# Patient Record
Sex: FEMALE | Race: OTHER | HISPANIC OR LATINO | Employment: UNEMPLOYED | URBAN - METROPOLITAN AREA
[De-identification: names, ages, dates, MRNs, and addresses within clinical notes are randomized per-mention and may not be internally consistent; named-entity substitution may affect disease eponyms.]

---

## 2022-05-09 ENCOUNTER — TELEPHONE (OUTPATIENT)
Dept: FAMILY MEDICINE CLINIC | Facility: CLINIC | Age: 67
End: 2022-05-09

## 2022-05-09 NOTE — TELEPHONE ENCOUNTER
Dr Attila Eduardo 5/16/22 at 3:20 pm told me when we ready to hung up needs transportation for appointment   Czech speaking

## 2022-05-10 ENCOUNTER — PATIENT OUTREACH (OUTPATIENT)
Dept: FAMILY MEDICINE CLINIC | Facility: CLINIC | Age: 67
End: 2022-05-10

## 2022-05-10 DIAGNOSIS — Z74.8 ASSISTANCE NEEDED WITH TRANSPORTATION: Primary | ICD-10-CM

## 2022-05-10 NOTE — PROGRESS NOTES
SW attempted to reach pt via  ID # [id number] but SW  had to leave a message  SW also called Emergency Contact Denise Saavedra [phone] and requested he have pt return SW call  Pt attempted to return call X 3     CORBY then called pt back it went to VM X 4  and SW left another message  SW has called Ladan Martinez [phone] X [phone] and spoke to Lillie Howard to see if we can start a 60 day temporary registration  CORBY to check on this Request tomorrow and return call to pt again

## 2022-05-11 ENCOUNTER — PATIENT OUTREACH (OUTPATIENT)
Dept: FAMILY MEDICINE CLINIC | Facility: CLINIC | Age: 67
End: 2022-05-11

## 2022-05-11 NOTE — PROGRESS NOTES
SWCM outreached pt to follow up on transportation needs as let her know that at temporary 60 day registration was received  SWCM used language line  name, Raissa Jacobs #[id number]  Pt did not , VM left via   SW to follow up

## 2022-05-11 NOTE — PROGRESS NOTES
CORBY spoke with Renetta Gavin this date sn pt has been  APPROVED for a TEMPORARY 60 Day Registration from 5/11/22 - 7/10/22  She needs to complete and submit the full [place] age 72 and over application before 0/88/[birth year] to maintain this service  Sw has asked covering SW to inform pt of same, help set up her ride for the upcoming appointment  and help with full application

## 2022-05-12 ENCOUNTER — PATIENT OUTREACH (OUTPATIENT)
Dept: FAMILY MEDICINE CLINIC | Facility: CLINIC | Age: 67
End: 2022-05-12

## 2022-05-12 NOTE — PROGRESS NOTES
RAMEZ received VM from pt to return call  CORBY used Language Line  [phone]  Pt resides with her friend Jerri Huddleston  Pt has 2 sons and DIL who live local and who are supportive  Pt does not report any source of income at this time, but reports that her children support her to afford housing and food  She denies any difficulty obtaining food or paying bills  The trouble she is having is that her family cannot always provide her rides and she does not have a vehicle  CORBY informed pt that we were able to get her approved for 60 day temporary registration with Selena Henry from 5/11/2022-7/10-22  CORBY contacted Elizabeth malave pt on the phone to set up her service for Monday to her 3PM appt at [facility]  Pt provided details to schedule other upcoming appts  RAMEZ will meet with pt in the office on 5/16 to assist her with Vergie Genin application  Pt instructed to bring in copy of ins cards and identification  No other reported needs at this time  CORBY will open case and follow up with pt to make sure Vergie Genin application is completed and sent

## 2022-05-16 ENCOUNTER — OFFICE VISIT (OUTPATIENT)
Dept: FAMILY MEDICINE CLINIC | Facility: CLINIC | Age: 67
End: 2022-05-16

## 2022-05-16 ENCOUNTER — PATIENT OUTREACH (OUTPATIENT)
Dept: FAMILY MEDICINE CLINIC | Facility: CLINIC | Age: 67
End: 2022-05-16

## 2022-05-16 VITALS
HEART RATE: 73 BPM | TEMPERATURE: 98.4 F | DIASTOLIC BLOOD PRESSURE: 68 MMHG | SYSTOLIC BLOOD PRESSURE: 115 MMHG | OXYGEN SATURATION: 98 % | HEIGHT: 60 IN | BODY MASS INDEX: 41.62 KG/M2 | RESPIRATION RATE: 18 BRPM | WEIGHT: 212 LBS

## 2022-05-16 DIAGNOSIS — Z12.31 ENCOUNTER FOR SCREENING MAMMOGRAM FOR MALIGNANT NEOPLASM OF BREAST: ICD-10-CM

## 2022-05-16 DIAGNOSIS — Z00.00 HEALTHCARE MAINTENANCE: ICD-10-CM

## 2022-05-16 DIAGNOSIS — Z13.6 SCREENING FOR CARDIOVASCULAR CONDITION: ICD-10-CM

## 2022-05-16 DIAGNOSIS — R53.82 CHRONIC FATIGUE: ICD-10-CM

## 2022-05-16 DIAGNOSIS — Z13.31 SCREENING FOR DEPRESSION: ICD-10-CM

## 2022-05-16 DIAGNOSIS — Z71.3 NUTRITIONAL COUNSELING: ICD-10-CM

## 2022-05-16 DIAGNOSIS — Z78.0 ENCOUNTER FOR OSTEOPOROSIS SCREENING IN ASYMPTOMATIC POSTMENOPAUSAL PATIENT: ICD-10-CM

## 2022-05-16 DIAGNOSIS — Z23 ENCOUNTER FOR IMMUNIZATION: ICD-10-CM

## 2022-05-16 DIAGNOSIS — Z13.820 ENCOUNTER FOR OSTEOPOROSIS SCREENING IN ASYMPTOMATIC POSTMENOPAUSAL PATIENT: ICD-10-CM

## 2022-05-16 DIAGNOSIS — H61.23 EXCESSIVE CERUMEN IN BOTH EAR CANALS: ICD-10-CM

## 2022-05-16 DIAGNOSIS — Z11.59 ENCOUNTER FOR HEPATITIS C SCREENING TEST FOR LOW RISK PATIENT: ICD-10-CM

## 2022-05-16 DIAGNOSIS — M25.551 RIGHT HIP PAIN: ICD-10-CM

## 2022-05-16 DIAGNOSIS — Z11.4 ENCOUNTER FOR SCREENING FOR HIV: ICD-10-CM

## 2022-05-16 DIAGNOSIS — Z00.00 ANNUAL PHYSICAL EXAM: Primary | ICD-10-CM

## 2022-05-16 DIAGNOSIS — Z23 NEED FOR TDAP VACCINATION: ICD-10-CM

## 2022-05-16 DIAGNOSIS — Z12.11 COLON CANCER SCREENING: ICD-10-CM

## 2022-05-16 DIAGNOSIS — Z71.82 EXERCISE COUNSELING: ICD-10-CM

## 2022-05-16 PROCEDURE — 99387 INIT PM E/M NEW PAT 65+ YRS: CPT

## 2022-05-16 PROCEDURE — 90471 IMMUNIZATION ADMIN: CPT

## 2022-05-16 PROCEDURE — 90677 PCV20 VACCINE IM: CPT

## 2022-05-16 PROCEDURE — 90472 IMMUNIZATION ADMIN EACH ADD: CPT

## 2022-05-16 PROCEDURE — 90715 TDAP VACCINE 7 YRS/> IM: CPT

## 2022-05-16 PROCEDURE — 99214 OFFICE O/P EST MOD 30 MIN: CPT

## 2022-05-16 NOTE — PATIENT INSTRUCTIONS
Visita de bienestar para los adultos   CUIDADO AMBULATORIO:   Angela visita de bienestar es cuando usted acude con un médico para que le mariangel exámenes de detección de problemas de [place]  Thomas médico también le dará consejos sobre cómo mantenerse saludable  Anote katja preguntas para que se acuerde de hacerlas  Pregunte a thomas médico con qué frecuencia debería realizarse angela visita de bienestar  Lo que sucede en angela visita de bienestar: Thomas médico le preguntará sobre thomas cynthia y thomas historia familiar [address]  [place] incluye presión arterial gilda, enfermedades del corazón y cáncer  El médico le preguntará si tiene síntomas que le preocupen, si [facility] y [place] de ánimo  También se le preguntará acerca del uso de medicamentos, suplementos, alimentos y alcohol  Es posible que le mariangel cualquiera de lo siguiente:  Thomas peso será revisado  Es posible que [employer] midan thomas altura para calcular thomas índice de masa corporal [facility])  El [address] indica si tiene un peso saludable  Se verificarán thomas presión arterial y frecuencia cardíaca  También pueden revisar thomas temperatura  Exámenes de [facility] y [facility] podría realizarse  Se podrían realizar exámenes de joao para revisar los niveles de [facility]  Los niveles anormales de colesterol aumentan el riesgo de enfermedad del corazón y accidente cerebrovascular  Puede que también necesite angela prueba de joao u orina para revisar si tiene diabetes si usted está en mayor riesgo  Las pruebas de orina pueden hacerse para buscar signos de angela infección o angela enfermedad renal     Un examen físico incluye la comprobación de katja latidos del corazón y los pulmones con un estetoscopio  Thomas médico también podría revisarle la piel para buscar daños causados por el sol  Pruebas de detección podría recomendarse  Se realiza un examen de detección para detectar enfermedades que pueden no causar síntomas   Los exámenes de detección que necesite dependen de thomas edad, género, antecedentes familiares y hábitos de harjit  Por ejemplo, podrían recomendarle la exploración selectiva colorrectal si tiene 48 años o más  Si es [place], necesita los siguientes exámenes de detección:  El examen de Papanicolaou se [place] para detectar cáncer de haley uterino  El examen del Papanicolaou por lo general se realiza entre 3 a 5 años dependiendo de thomas edad  Puede necesitarlo más a menudo si usted ha tenido [employer] de la prueba de Papanicolaou en el pasado  Pregunte a thomas médico con qué frecuencia debería realizarse un examen de Papanicolaou  Angela mamografía es angela radiografía de katja mamas para detectar cáncer de mama  Los expertos recomiendan [address] cada 2 años empezando a los 48 años de [place]  Es probable que usted necesite [address] a los 52 años o antes si tiene riesgo alto de cáncer de seno  Hable con thomas médico sobre cuándo debe empezar con katja mamografías y con cuánta frecuencia las necesita  Vacunas que podría necesitar:  Debe recibir angela vacuna contra la influenza todos los Los rosita  La vacuna contra la influenza protege de la gripe  Varios tipos de virus causan la influenza  Debido a que los virus [place] con el [place], es necesaria la producción de nuevas vacunas cada año  Debe recibir Zorita Slack vacuna de refuerzo contra el tétanos-difteria (Td) cada 10 años  Esta vacuna protege contra el tétanos y la difteria  El tétanos es angela infección severa que puede causar trismo y espasmos musculares dolorosos  La difteria es angela infección bacteriana grave que produce angela cubierta gruesa en la parte de atrás de la boca y garganta  Debe recibir la vacuna contra el virus del papiloma humano (VPH) si usted es karen y [place] 19 y 32 años o es hombre y [place] 23 y 24 años y nunca la recibió  Esta vacuna protege contra la infección por VPH   El virus del papiloma humano o VPH es la infección más común que se transmite por contacto sexual  El virus del papiloma humano también podría provocar cáncer vaginal, del pene y del ano  Debe recibir la vacuna antineumocócica si tiene más de 70 años  La vacuna antineumocócica es angela inyección que se administra para protegerlo contra angela enfermedad neumocócica  La enfermedad neumocócica es angela infección causada por la bacteria neumocócica  La infección puede causar neumonía, meningitis o angela infección del oído  Debe recibir la vacuna contra la culebrilla Si tiene 61 años o más, incluso si ha tenido culebrilla antes  La vacuna contra la culebrilla (herpes zóster) es angela inyección usada para proteger contra el virus zóster que causa la varicela  Desi es el mismo virus que causa la varicela  La culebrilla es un sarpullido doloroso que se desarrolla en personas que tuvieron varicela o mace estado expuestas al virus  Cómo comer saludable: Mi [employer] es un modelo para planear comidas sanas  Muestra los tipos y cantidades de alimentos que deberían ir en un plato  Diana Mires y verduras representan alrededor de la mitad de thomas plato y los granos y proteínas representan la otra mitad  Se incluye angela porción de productos lácteos al lado del plato  La cantidad de calorías y [address] de las porciones que usted necesita dependen de thomas edad, [place], peso y altura  Los ejemplos de alimentos saludables son:  Pierce Fernando variedad de verduras flavio las de color pavan oscuro, quintero y [place]  Usted también puede incluir verduras enlatadas bajas en sodio (sal) y verduras congeladas sin mantequilla ni salsas [id number]  Consuma angela variedad de fruta frescas , las frutas enlatadas en 100% de jugo, fruta [place] y bree secos  Incluya granos enteros  Por lo menos la mitad de los granos que usted consume deben ser granos de helio integral  Por ejemplo, panes de grano entero, pasta integral, arroz integral y cereales de grano entero flavio la andrew      Consuma angela variedad de alimentos con proteínas flavio mariscos (pescado y crustáceos), Susie Cruzito y carne de ave sin piel (pavo y luis)  Ejemplos de jamarcus magras incluyen pierna, paleta o lomo de puerco y steven, solomillo o, lomo de res y carne [place] de res extra [place]  Otros alimentos ricos en proteínas son los huevos y sustitutos de [place], frijoles, chícharos, productos de soya, nueces y semillas  Elija productos lácteos bajos en grasa [employer] o del 1% o yogur, Michele Fail y requesón bajos en grasa  Limite las grasas poco saludables, flavio la [place], la margarina dura y la [place]  Ejercicio: Realice angela actividad física por lo menos 30 minutos al día, la mayoría de los días de la [place]  Algunos de los ejercicios incluyen caminar, montar en bicicleta, bailar y nadar  Usted también puede realizar más actividad física usando las escaleras en vez de los ascensores o estacionarse más lejos cuando Lorra Pouch a las tiendas  Incluya ejercicios para fortalecer los músculos 2 días a la semana  El ejercicio regular proporciona muchos beneficios para la cynthia  Evorn Cordon a controlar thomas peso y [employer] riesgo de diabetes tipo 2, presión arterial gilda, enfermedad del corazón y accidente cerebrovascular  El ejercicio [employer] puede ayudar a mejorar thomas estado de ánimo  Pregunte a thomas médico acerca del mejor plan de ejercicio para usted  Pautas generales de cynthia y seguridad:  No fume  La nicotina y otras sustancias químicas que contienen los cigarrillos y cigarros pueden dañar los pulmones  Pida información a thomas médico si usted actualmente fuma y necesita ayuda para dejar de fumar  Los cigarrillos electrónicos o el tabaco sin humo igualmente contienen nicotina  Consulte con thomas médico antes de [employer]  Limite el consumo de alcohol  Un trago equivale a 12 onzas de cerveza, 5 onzas de vino o 1 onza y ½ de licor  Baje de peso, si es necesario  El sobrepeso aumenta el riesgo de ciertas condiciones de [employer]   Estos incluyen enfermedad del corazón, presión arterial gilda, diabetes tipo 2 y ciertos tipos de cáncer  Proteja thomas piel  No tome el sol ni use melissa de bronceado  Use un protector solar con un FPS mayor a 13  Aplíquese el bloqueador por lo menos 15 minutos antes de que vaya a estar al [employer]  Vuelva a aplicarse la crema solar cada 2 horas  Use ropa protectora, sombrero y lentes para el sol cuando se encuentre afuera  Conduzca con seguridad  Use siempre thomas cinturón de seguridad  Asegúrese que todos en el tea usan el cinturón de seguridad  Un cinturón de seguridad puede salvar thomas harjit en veronika de un accidente automovilístico  No use el celular cuando esté manejando  Corn puede hacer que se distraiga y causar un accidente  Es mejor que pare y se orille si necesita hacer angela Ceola Petit un texto  Practique el sexo seguro  Use condones de látex si es sexualmente [place] y tiene más de [place] and [place]  Thomas médico puede recomendar exámenes de detección de infecciones de transmisión sexual (ITS)  Use un carlie, un chaleco salvavidas y unos implementos de protección  Siempre use un carlie al [employer] en bicicleta o motocicleta, esquiar o jugar deportes que podrían causar angela lesión en la keven  Use implementos de protección cuando practique deportes  Use un chaleco salvavidas cuando esté en un bote o practicando actividades acuáticas  © Copyright [employer] 2022 Information is for End User's use only and may not be sold, redistributed or otherwise used for commercial purposes  All illustrations and images included in CareNotes® are the copyrighted property of [employer] , [employer]  or [address] [place] [address] es sólo para uso en educación  Thomas intención no es darle un consejo médico sobre enfermedades o tratamientos  Colsulte con thomas Joanell Saw farmacéutico antes de seguir cualquier régimen médico para saber si es seguro y efectivo para usted

## 2022-05-16 NOTE — PROGRESS NOTES
OFFICE VISIT NOTE - [id number] [facility] Elio 78 y o  (:1955) female MRN: [id number]  Unit/Bed#:  Encounter: [id number]      Date: 22    Assessment and Plan     Chronic fatigue  Reports persistent chronic fatigue x several months  endorses snoring, daytime sleepiness, unrefreshed sleep c/w CHEMO  depression screen negative, will order blood work and sleep study    Right hip pain  No red flags, most likely 2/2 osteoarthritis given other joint pains but no inflammation, rash or other concerning sx  Right hip XR ordered, follow-up in 2 wks    Healthcare maintenance  - Patient is a 78 y o  female new to practice establishing care  - BP today 115/68, at goal  - Screening for cardiovascular disease: A1c, BMP and Lipid panel ordered today  - Screening for colorectal cancer: ordered today, none on file but pt reports she has done it several years ago  - Screening for breast cancer: ordered today, never done  - Screening for depression: PHQ-2 score 0  - Screening for HIV and Hepatitis C: ordered today  - Screening for Osteoporosis: ordered today  - Fall prevention: no recent falls  - Immunizations: Tdap and pneumococcal vaccine given today  - Discussed about healthy lifestyle recommendations including healthful diet, exercise, weight loss  - BMI Counseling: Body mass index is 40 99 kg/m²  The BMI is above normal  Nutrition recommendations include 3-5 servings of fruits/vegetables daily, decreasing soda and/or juice intake, moderation in carbohydrate intake and increasing intake of lean protein  Exercise recommendations include moderate aerobic physical activity for 150 minutes/week  Patient referred to weight management due to patient being morbidly obese  Diagnoses and all orders for this visit:    Annual physical exam    Screening for cardiovascular condition  -     Lipid panel; Future  -     Comprehensive metabolic panel;  Future  -     Lipid panel  - Comprehensive metabolic panel    Encounter for screening mammogram for malignant neoplasm of breast  -     Mammo screening bilateral w 3d & cad; Future    Colon cancer screening  -     Ambulatory referral to Gastroenterology; Future    Encounter for osteoporosis screening in asymptomatic postmenopausal patient  -     DXA bone density spine hip and pelvis; Future    Encounter for screening for HIV  -     HIV 1/2 Antigen/Antibody (4th Generation) w Reflex [facility]; Future    Encounter for hepatitis C screening test for low risk patient  -     Hepatitis C antibody; Future    Encounter for immunization  -     Cancel: TDAP VACCINE GREATER THAN OR EQUAL TO 8YO IM  -     Cancel: Pneumococcal Conjugate Vaccine 20-valent (Pcv20)  -     Pneumococcal Conjugate Vaccine 20-valent (Pcv20)  -     TDAP VACCINE GREATER THAN OR EQUAL TO 8YO IM    Screening for depression    Nutritional counseling    Need for Tdap vaccination    Exercise counseling    Healthcare maintenance    Chronic fatigue  -     Hemoglobin A1C; Future  -     TSH, 3rd generation with Free T4 reflex; Future  -     CBC; Future  -     Ambulatory Referral to Sleep Medicine; Future  -     Iron Panel (Includes Ferritin, Iron Sat%, Iron, and TIBC); Future  -     Ambulatory Referral to Weight Management; Future  -     TSH, 3rd generation with Free T4 reflex  -     CBC  -     Vitamin B12/Folate, Serum Panel; Future  -     Vitamin B12/Folate, Serum Panel    Right hip pain  -     XR hip/pelv 2-3 vws right if performed; Future    Excessive cerumen in both ear canals  -     carbamide peroxide (DEBROX) 6 5 % otic solution; Administer 5 drops into both ears in the morning and 5 drops in the evening          Subjective:  Chief Complaint   Patient presents with    Right leg pain routing to right hip     Per patient this has been going on for two years and never been treated    Establish Care       History of Present Illness     Simmie Schilder is a 78 y o  female new to our practice and establishing care  Patient used to live in [place] prior but moved to [place] in December   was used for this visit  Patient reports right hip pain that is chronic for her x several years which has been gradually worsening  She is able to ambulate wo any assistive device  She denies fever, unexpected weight changes, radiation to leg, weakness, numbness or tingling  He denies history of fall, trauma or injury  She is unaware if she has osteoarthritis, no imaging found on file  Patient denies smoking, alcohol or illicit drug use  She reports that she gets GI intolerance from aspirin  Does not take any routine meds  Patient feels other also raises concern about possible sleep apnea  She reports that she never feels refreshed after a long night's sleep and would wake up feeling tired throughout her day  She denies cough, nausea, vomiting, abdominal pain, palpitations, SOB, blood in stool or hematuria  Patient denies history of heart disease, HTN, hyperlipidemia or stroke  Family history is negative diabetes HTN but patient reports that father passed from metastatic stomach cancer  Her mother is aged 80 and healthy  The following portions of the patient's history were reviewed and updated as appropriate: allergies, current medications, past family history, past medical history, past social history, past surgical history and problem list     Review of Systems     Review of Systems   Constitutional: Negative for fatigue and fever  HENT: Negative for sore throat  Respiratory: Negative for cough, shortness of breath and wheezing  Cardiovascular: Negative for chest pain, palpitations and leg swelling  Gastrointestinal: Negative for abdominal pain, diarrhea, nausea and vomiting  Genitourinary: Negative for dysuria and pelvic pain  Skin: Negative for rash  Neurological: Negative for weakness and headaches         Current Medications     No current outpatient medications on file prior to visit  No current facility-administered medications on file prior to visit  Objective     Vitals: /68 (BP Location: Right arm, Patient Position: Sitting, Cuff Size: Standard)   Pulse 73   Temp 98 4 °F (36 9 °C) (Temporal)   Resp 18   Ht 5' 0 3" (1 532 m)   Wt 96 2 kg (212 lb)   SpO2 98%   BMI 40 99 kg/m²       Physical Exam  Constitutional:       General: She is not in acute distress  Appearance: She is obese  She is not ill-appearing  HENT:      Head: Normocephalic and atraumatic  Right Ear: Tympanic membrane, ear canal and external ear normal  There is no impacted cerumen  Left Ear: Tympanic membrane, ear canal and external ear normal  There is no impacted cerumen  Nose: Nose normal       Mouth/Throat:      Mouth: Mucous membranes are moist       Pharynx: No oropharyngeal exudate or posterior oropharyngeal erythema  Eyes:      General:         Right eye: No discharge  Left eye: No discharge  Extraocular Movements: Extraocular movements intact  Conjunctiva/sclera: Conjunctivae normal       Pupils: Pupils are equal, round, and reactive to light  Cardiovascular:      Rate and Rhythm: Normal rate and regular rhythm  Heart sounds: Normal heart sounds  No murmur heard  Pulmonary:      Effort: Pulmonary effort is normal  No respiratory distress  Breath sounds: Normal breath sounds  No wheezing  Abdominal:      General: Bowel sounds are normal  There is no distension  Palpations: Abdomen is soft  There is no mass  Tenderness: There is no abdominal tenderness  There is no guarding or rebound  Musculoskeletal:         General: No swelling, tenderness or deformity  Normal range of motion  Cervical back: Normal range of motion and neck supple  Right lower leg: No edema  Left lower leg: No edema  Skin:     General: Skin is warm        Capillary Refill: Capillary refill takes less than 2 seconds  Findings: No bruising or rash  Neurological:      General: No focal deficit present  Mental Status: She is alert and oriented to person, place, and time     Psychiatric:         Behavior: Behavior normal            Joseph Figueredo MD  Family Medicine PGY-2  05/16/22

## 2022-05-16 NOTE — PROGRESS NOTES
[address] [facility] [place]    NAME: Russ Fierro  AGE: 78 y o  SEX: female  : 1955     DATE: 2022     Assessment and Plan:     Problem List Items Addressed This Visit        Nervous and Auditory    Excessive cerumen in both ear canals     Noted on exam, debrox drops x 5 bilaterally  Follow up x 2wks  Relevant Medications    carbamide peroxide (DEBROX) 6 5 % otic solution       Other    Chronic fatigue     Reports persistent chronic fatigue x several months  endorses snoring, daytime sleepiness, unrefreshed sleep c/w CHEMO  depression screen negative, will order blood work and sleep study           Relevant Orders    Hemoglobin A1C    TSH, 3rd generation with Free T4 reflex    CBC    Ambulatory Referral to Sleep Medicine    Iron Panel (Includes Ferritin, Iron Sat%, Iron, and TIBC)    Ambulatory Referral to Weight Management    Vitamin B12/Folate, Serum Panel    Right hip pain     No red flags, most likely 2/2 osteoarthritis given other joint pains but no inflammation, rash or other concerning sx  Right hip XR ordered, follow-up in 2 wks           Relevant Orders    XR hip/pelv 2-3 vws right if performed    Healthcare maintenance     - Patient is a 78 y o  female new to practice establishing care  - BP today 115/68, at goal  - Screening for cardiovascular disease: A1c, BMP and Lipid panel ordered today  - Screening for colorectal cancer: ordered today, none on file but pt reports she has done it several years ago  - Screening for breast cancer: ordered today, never done  - Screening for depression: PHQ-2 score 0  - Screening for HIV and Hepatitis C: ordered today  - Screening for Osteoporosis: ordered today  - Fall prevention: no recent falls  - Immunizations: Tdap and pneumococcal vaccine given today    - Discussed about healthy lifestyle recommendations including healthful diet, exercise, weight loss  - BMI Counseling: Body mass index is 40 99 kg/m²  The BMI is above normal  Nutrition recommendations include 3-5 servings of fruits/vegetables daily, decreasing soda and/or juice intake, moderation in carbohydrate intake and increasing intake of lean protein  Exercise recommendations include moderate aerobic physical activity for 150 minutes/week  Patient referred to weight management due to patient being morbidly obese  Other Visit Diagnoses     Annual physical exam    -  Primary    Screening for cardiovascular condition        Relevant Orders    Lipid panel    Comprehensive metabolic panel    Encounter for screening mammogram for malignant neoplasm of breast        Relevant Orders    Mammo screening bilateral w 3d & cad    Colon cancer screening        Relevant Orders    Ambulatory referral to Gastroenterology    Encounter for osteoporosis screening in asymptomatic postmenopausal patient        Relevant Orders    DXA bone density spine hip and pelvis    Encounter for screening for HIV        Relevant Orders    HIV 1/2 Antigen/Antibody (4th Generation) w Reflex [facility]    Encounter for hepatitis C screening test for low risk patient        Relevant Orders    Hepatitis C antibody    Encounter for immunization        Relevant Orders    Pneumococcal Conjugate Vaccine 20-valent (Pcv20) (Completed)    TDAP VACCINE GREATER THAN OR EQUAL TO 6YO IM (Completed)    Screening for depression        Nutritional counseling        Need for Tdap vaccination        Exercise counseling              Immunizations and preventive care screenings were discussed with patient today  Appropriate education was printed on patient's after visit summary  Counseling:  Alcohol/drug use: discussed moderation in alcohol intake, the recommendations for healthy alcohol use, and avoidance of illicit drug use  Dental Health: discussed importance of regular tooth brushing, flossing, and dental visits    · Exercise: the importance of regular exercise/physical activity was discussed  Recommend exercise 3-5 times per week for at least 30 minutes  Return in about 2 weeks (around 2022) for chronic fatigue, right hip pain f/u  Chief Complaint:     Chief Complaint   Patient presents with    Right leg pain routing to right hip     Per patient this has been going on for two years and never been treated    Establish Care      History of Present Illness:     Adult Annual Physical   Patient here for a comprehensive physical exam  The patient reports problems - chronic fatigue and right hip pain (see other note)  Diet and Physical Activity  · Diet/Nutrition: heart healthy (low sodium) diet, limited junk food and consuming 3-5 servings of fruits/vegetables daily  · Exercise: no formal exercise  Depression Screening  PHQ-2/9 Depression Screening    Little interest or pleasure in doing things: 0 - not at all  Feeling down, depressed, or hopeless: 0 - not at all  Trouble falling or staying asleep, or sleeping too much: 0 - not at all  Feeling tired or having little energy: 0 - not at all  Poor appetite or overeatin - not at all  Feeling bad about yourself - or that you are a failure or have let yourself or your family down: 0 - not at all  Trouble concentrating on things, such as reading the newspaper or watching television: 0 - not at all  Moving or speaking so slowly that other people could have noticed  Or the opposite - being so fidgety or restless that you have been moving around a lot more than usual: 0 - not at all  Thoughts that you would be better off dead, or of hurting yourself in some way: 0 - not at all  PHQ-2 Score: 0  PHQ-2 Interpretation: Negative depression screen  PHQ-9 Score: 0   PHQ-9 Interpretation: No or Minimal depression        General Health  · Sleep: sleeps poorly, gets 4-6 hours of sleep on average, snores loudly, experiences daytime hypersomnolence and unrefreshing sleep     · Hearing: normal - bilateral   · Vision: no vision problems  · Dental: brushes teeth twice daily and flosses teeth occasionally  /GYN Health  · Patient is: postmenopausal  · Last menstrual period:   · Contraceptive method: N/A  Review of Systems:     Review of Systems   Constitutional: Negative for chills, fatigue and fever  HENT: Negative for sore throat  Eyes: Negative for photophobia, redness and visual disturbance  Respiratory: Negative for cough, choking, chest tightness, shortness of breath and wheezing  Cardiovascular: Negative for chest pain, palpitations and leg swelling  Gastrointestinal: Negative for abdominal distention, abdominal pain, blood in stool, constipation, diarrhea, nausea and vomiting  Genitourinary: Negative for dysuria  Musculoskeletal: Negative for arthralgias, back pain and neck pain  Skin: Negative for rash  Neurological: Negative for dizziness, weakness, numbness and headaches  Past Medical History:     History reviewed  No pertinent past medical history     Past Surgical History:     Past Surgical History:   Procedure Laterality Date     SECTION        Social History:     Social History     Socioeconomic History    Marital status:      Spouse name: None    Number of children: 3    Years of education: None    Highest education level: None   Occupational History    None   Tobacco Use    Smoking status: Never Smoker    Smokeless tobacco: Never Used   Vaping Use    Vaping Use: Never used   Substance and Sexual Activity    Alcohol use: Never    Drug use: Never    Sexual activity: Not Currently   Other Topics Concern    None   Social History Narrative    None     Social Determinants of Health     Financial Resource Strain: Low Risk     Difficulty of Paying Living Expenses: Not very hard   Food Insecurity: Unknown    Worried About Running Out of Food in the Last Year: Patient refused    Ran Out of Food in the Last Year: Patient refused Transportation Needs: No Transportation Needs    Lack of Transportation (Medical): No    Lack of Transportation (Non-Medical): No   Physical Activity: Not on file   Stress: No Stress Concern Present    Feeling of Stress : Only a little   Social Connections: Unknown    Frequency of Communication with Friends and Family: More than three times a week    Frequency of Social Gatherings with Friends and Family: More than three times a week    Attends Rastafari Services: Not on file   [employer] [facility] or Organizations: Not on file    Attends Club or Organization Meetings: Not on file    Marital Status: Not on file   Intimate Partner Violence: Not At Risk    Fear of Current or Ex-Partner: No    Emotionally Abused: No    Physically Abused: No    Sexually Abused: No   Housing Stability: Low Risk     Unable to Pay for Housing in the Last Year: No    Number of [place] in the Last Year: 2    Unstable Housing in the Last Year: No      Family History:     Family History   Problem Relation Age of Onset    Cancer Father     Stomach cancer Father       Current Medications:     Current Outpatient Medications   Medication Sig Dispense Refill    carbamide peroxide (DEBROX) 6 5 % otic solution Administer 5 drops into both ears in the morning and 5 drops in the evening  15 mL 0     No current facility-administered medications for this visit  Allergies: Allergies   Allergen Reactions    Aspirin GI Intolerance      Physical Exam:     /68 (BP Location: Right arm, Patient Position: Sitting, Cuff Size: Standard)   Pulse 73   Temp 98 4 °F (36 9 °C) (Temporal)   Resp 18   Ht 5' 0 3" (1 532 m)   Wt 96 2 kg (212 lb)   SpO2 98%   BMI 40 99 kg/m²     Physical Exam  Vitals reviewed  Constitutional:       General: She is not in acute distress  Appearance: Normal appearance  She is obese  She is not ill-appearing  HENT:      Head: Normocephalic and atraumatic        Right Ear: Tympanic membrane, ear canal and external ear normal  There is no impacted cerumen  Left Ear: Tympanic membrane, ear canal and external ear normal  There is no impacted cerumen  Nose: Nose normal  No congestion  Mouth/Throat:      Mouth: Mucous membranes are moist    Eyes:      General:         Right eye: No discharge  Left eye: No discharge  Conjunctiva/sclera: Conjunctivae normal       Pupils: Pupils are equal, round, and reactive to light  Cardiovascular:      Rate and Rhythm: Normal rate and regular rhythm  Heart sounds: Normal heart sounds  Pulmonary:      Effort: Pulmonary effort is normal  No respiratory distress  Breath sounds: Normal breath sounds  Chest:      Chest wall: No tenderness  Abdominal:      General: Abdomen is flat  Bowel sounds are normal  There is no distension  Palpations: Abdomen is soft  There is no mass  Tenderness: There is no abdominal tenderness  Musculoskeletal:         General: No deformity  Cervical back: Normal range of motion  Right lower leg: No edema  Left lower leg: No edema  Skin:     General: Skin is warm  Capillary Refill: Capillary refill takes less than 2 seconds  Findings: No rash  Neurological:      General: No focal deficit present  Mental Status: She is alert and oriented to person, place, and time     Psychiatric:         Mood and Affect: Mood normal          Behavior: Behavior normal           Marito Genao MD  [address] [place] [address]

## 2022-05-16 NOTE — PROGRESS NOTES
SWCM met w pt in room to assist with Johnathan Batch Application  SW spoke w pt via [address]  #[id number]  Pt filled out and signed lanta application  SW made copy of pts passport  Consent for signed for self and Cris Garcia to contact [facility] on behalf of pt  [facility] to mail application for pt  Pt has no other needs at this time  CM to outreach pt in 2 weeks to make sure application was approved   [facility] to follow

## 2022-05-17 NOTE — ASSESSMENT & PLAN NOTE
Reports persistent chronic fatigue x several months  endorses snoring, daytime sleepiness, unrefreshed sleep c/w CHEMO  depression screen negative, will order blood work and sleep study

## 2022-05-17 NOTE — ASSESSMENT & PLAN NOTE
No red flags, most likely 2/2 osteoarthritis given other joint pains but no inflammation, rash or other concerning sx    Right hip XR ordered, follow-up in 2 wks

## 2022-05-17 NOTE — ASSESSMENT & PLAN NOTE
- Patient is a 78 y o  female new to practice establishing care  - BP today 115/68, at goal  - Screening for cardiovascular disease: A1c, BMP and Lipid panel ordered today  - Screening for colorectal cancer: ordered today, none on file but pt reports she has done it several years ago  - Screening for breast cancer: ordered today, never done  - Screening for depression: PHQ-2 score 0  - Screening for HIV and Hepatitis C: ordered today  - Screening for Osteoporosis: ordered today  - Fall prevention: no recent falls  - Immunizations: Tdap and pneumococcal vaccine given today  - Discussed about healthy lifestyle recommendations including healthful diet, exercise, weight loss  - BMI Counseling: Body mass index is 40 99 kg/m²  The BMI is above normal  Nutrition recommendations include 3-5 servings of fruits/vegetables daily, decreasing soda and/or juice intake, moderation in carbohydrate intake and increasing intake of lean protein  Exercise recommendations include moderate aerobic physical activity for 150 minutes/week  Patient referred to weight management due to patient being morbidly obese

## 2022-05-25 ENCOUNTER — CONSULT (OUTPATIENT)
Dept: GASTROENTEROLOGY | Facility: CLINIC | Age: 67
End: 2022-05-25
Payer: COMMERCIAL

## 2022-05-25 VITALS
WEIGHT: 209 LBS | TEMPERATURE: 97.3 F | DIASTOLIC BLOOD PRESSURE: 82 MMHG | HEIGHT: 60 IN | SYSTOLIC BLOOD PRESSURE: 127 MMHG | BODY MASS INDEX: 41.03 KG/M2

## 2022-05-25 DIAGNOSIS — K21.9 GASTROESOPHAGEAL REFLUX DISEASE, UNSPECIFIED WHETHER ESOPHAGITIS PRESENT: ICD-10-CM

## 2022-05-25 DIAGNOSIS — Z12.11 COLON CANCER SCREENING: ICD-10-CM

## 2022-05-25 DIAGNOSIS — R13.19 OTHER DYSPHAGIA: Primary | ICD-10-CM

## 2022-05-25 PROCEDURE — 99244 OFF/OP CNSLTJ NEW/EST MOD 40: CPT | Performed by: INTERNAL MEDICINE

## 2022-05-25 RX ORDER — MAGNESIUM CARB/ALUMINUM HYDROX 105-160MG
296 TABLET,CHEWABLE ORAL ONCE
Qty: 296 ML | Refills: 0 | Status: SHIPPED | OUTPATIENT
Start: 2022-05-25 | End: 2022-05-25

## 2022-05-25 RX ORDER — OMEPRAZOLE 40 MG/1
40 CAPSULE, DELAYED RELEASE ORAL DAILY
Qty: 30 CAPSULE | Refills: 2 | Status: SHIPPED | OUTPATIENT
Start: 2022-05-25

## 2022-05-25 RX ORDER — POLYETHYLENE GLYCOL 3350 17 G/17G
238 POWDER, FOR SOLUTION ORAL ONCE
Qty: 238 G | Refills: 0 | Status: SHIPPED | OUTPATIENT
Start: 2022-05-25 | End: 2022-05-25

## 2022-05-25 NOTE — ASSESSMENT & PLAN NOTE
Not on any chronic acid suppression therapy  Takes Tums intermittently  Reports having upper endoscopy in [facility] 6 years ago but report is not available  May be contributing to her symptoms of dysphagia    · Plan for EGD as noted above  · Start omeprazole 40 mg daily; instructed to take 30 minutes before breakfast

## 2022-05-25 NOTE — ASSESSMENT & PLAN NOTE
Denies any family history of colon cancer  Last colonoscopy reportedly fiber 6 years ago in [place]  Report is not available for review but patient states that a biopsy was performed and states it was not cancer  Denies any blood in her stool or unintentional weight loss  No change in bowel habits although does admit to having some intermittent diarrhea  Not on antiplatelets or anticoagulants    · Will schedule for screening colonoscopy  · MiraLax/magnesium citrate bowel prep; instructions provided

## 2022-05-25 NOTE — ASSESSMENT & PLAN NOTE
Has been having primarily solid food dysphagia for the last 2 years  Denies any unintentional weight loss  Reports having intermittent mild symptoms with liquids as well  No other alarm symptoms  Reports having had upper endoscopy in [place] fiber 6 years ago and reports having esophagus stretched  Report is not available for review  Potential etiologies include peptic stricture versus ring versus web versus EOE versus pseudoachalasia versus intrinsic mass causing obstruction versus achalasia versus other  Does take Tums intermittently for reflux symptoms so may very well have uncontrolled reflux contributing    · Plan for upper endoscopy for further evaluation; will obtain esophageal biopsies and will also plan to obtain duodenal as well as gastric biopsies given patient's history of intermittent loose stools as well as family history of gastric cancer   · Will start empirically on omeprazole 40 mg daily; advised patient to take medication 30 minutes before breakfast   · Further recommendations to follow pending upper endoscopy findings  · If endoscopy in biopsies are unremarkable, will discuss with patient regarding manometry to rule out underlying dysmotility  · Follow-up in 3 months

## 2022-05-25 NOTE — PROGRESS NOTES
[facility] - Outpatient Consultation  Daily Kessler 78 y o  female MRN: [id number]  Encounter: [id number]      ASSESSMENT & PLAN:      Problem List Items Addressed This Visit        Digestive    Other dysphagia - Primary     Has been having primarily solid food dysphagia for the last 2 years  Denies any unintentional weight loss  Reports having intermittent mild symptoms with liquids as well  No other alarm symptoms  Reports having had upper endoscopy in [facility] 6 years ago and reports having esophagus stretched  Report is not available for review  Potential etiologies include peptic stricture versus ring versus web versus EOE versus pseudoachalasia versus intrinsic mass causing obstruction versus achalasia versus other  Does take Tums intermittently for reflux symptoms so may very well have uncontrolled reflux contributing  · Plan for upper endoscopy for further evaluation; will obtain esophageal biopsies and will also plan to obtain duodenal as well as gastric biopsies given patient's history of intermittent loose stools as well as family history of gastric cancer   · Will start empirically on omeprazole 40 mg daily; advised patient to take medication 30 minutes before breakfast   · Further recommendations to follow pending upper endoscopy findings  · If endoscopy in biopsies are unremarkable, will discuss with patient regarding manometry to rule out underlying dysmotility  · Follow-up in 3 months           Relevant Medications    omeprazole (PriLOSEC) 40 MG capsule    Other Relevant Orders    EGD    GERD (gastroesophageal reflux disease)     Not on any chronic acid suppression therapy  Takes Tums intermittently  Reports having upper endoscopy in [facility] 6 years ago but report is not available  May be contributing to her symptoms of dysphagia    · Plan for EGD as noted above  · Start omeprazole 40 mg daily; instructed to take 30 minutes before breakfast Relevant Medications    omeprazole (PriLOSEC) 40 MG capsule    Other Relevant Orders    EGD       Other    Colon cancer screening     Denies any family history of colon cancer  Last colonoscopy reportedly fiber 6 years ago in [place]  Report is not available for review but patient states that a biopsy was performed and states it was not cancer  Denies any blood in her stool or unintentional weight loss  No change in bowel habits although does admit to having some intermittent diarrhea  Not on antiplatelets or anticoagulants  · Will schedule for screening colonoscopy  · MiraLax/magnesium citrate bowel prep; instructions provided           Relevant Medications    polyethylene glycol (MiraLax) 17 GM/SCOOP powder    magnesium citrate (CITROMA) 1 745 g/30 mL oral solution    Other Relevant Orders    Colonoscopy        Orders Placed This Encounter   Procedures    EGD     Standing Status:   Future     Standing Expiration Date:   5/25/2023     Order Specific Question:   Appointment priority classification? Answer:   Level 4 - Outpatient Screening/Elective over 1 month     Order Specific Question:   Requested Site     Answer:   [place]     Order Specific Question:   Performing Location     Answer:   Endoscopy Dept     Order Specific Question:   Anesthesia required? Answer:   Yes     Order Specific Question:   Is this procedure associated with another Endo procedure? Answer:   Yes     Order Specific Question:   Specify related procedure: Answer:   Colonoscopy    Colonoscopy     Standing Status:   Future     Standing Expiration Date:   5/25/2023     Order Specific Question:   Appointment priority classification? Answer:   Level 4 - Outpatient Screening/Elective over 1 month     Order Specific Question:   Requested Site     Answer:   [place]     Order Specific Question:   Performing Location     Answer:   Endoscopy Dept     Order Specific Question:   Anesthesia required? Answer:    Yes Order Specific Question:   Is this procedure associated with another Endo procedure? Answer:   Yes     Order Specific Question:   Specify related procedure: Answer:   EGD     ______________________________________________________________________    HPI:  57-year-old female presents to GI office for evaluation of dysphagia, GERD, and need for screening colonoscopy  Patient is primarily Maltese speaking and [employer] services were utilized for [place] interpretation  Patient reports that she has had issues with solid food dysphagia over the last 2 years or so  She also reports having some mild dysphagia to liquids at times although this is fairly infrequent  She does report some reflux and upper abdominal discomfort for which she intermittently takes Tums  She does not take any chronic acid suppression therapy  Denies any unintentional weight loss, nausea, or vomiting  She denies any odynophagia  She reports having had upper endoscopy in [place] fiber 6 years ago and reports having her esophagus stretched  That report is not available for review  Furthermore, patient reports that she is due for screening colonoscopy  Her last colonoscopy was reportedly fiber 6 years ago in [place] as well  She reports having had biopsies performed and states that they were not cancerous but does not recall any specific findings  That report is not available for review  She denies any family history of colon cancer but does report a family history of gastric cancer in her father  She is not on any anti-platelet agents or anticoagulants  Denies any blood in her stool  She denies any significant change in bowel habits but does intermittently have diarrhea  Denies any unintentional weight loss        Last EGD:  Remote at outside hospital, 5-6 years ago  Last colonoscopy:  Remote at outside hospital, 5-6 years ago    REVIEW OF SYSTEMS:    CONSTITUTIONAL: Denies any fever, chills, rigors, and weight loss  HEENT: No earache or tinnitus, denies hearing loss or visual disturbances  CARDIOVASCULAR: No chest pain or palpitations  RESPIRATORY: Denies any cough, hemoptysis, shortness of breath or dyspnea on exertion  GASTROINTESTINAL: As noted in the History of Present Illness  GENITOURINARY: No problems with urination, denies any hematuria or dysuria  NEUROLOGIC: No dizziness or vertigo, denies headaches   MUSCULOSKELETAL: Denies any muscle or joint pain   SKIN: Denies skin rashes or itching  ENDOCRINE: Denies excessive thirst, denies intolerance to heat or cold  PSYCHOSOCIAL: Denies depression or anxiety, denies any recent memory loss     Historical Information   Past Medical History:   Diagnosis Date    GERD (gastroesophageal reflux disease)      Past Surgical History:   Procedure Laterality Date     SECTION       Social History   Social History     Substance and Sexual Activity   Alcohol Use Never     Social History     Substance and Sexual Activity   Drug Use Never     Social History     Tobacco Use   Smoking Status Never Smoker   Smokeless Tobacco Never Used     Family History   Problem Relation Age of Onset    Cancer Father     Stomach cancer Father        MEDICATIONS & ALLERGIES:    Current Outpatient Medications   Medication Instructions    carbamide peroxide (DEBROX) 6 5 % otic solution 5 drops, Both Ears, 2 times daily    magnesium citrate (CITROMA) 1 745 g/30 mL oral solution 296 mL, Oral, Once    omeprazole (PRILOSEC) 40 mg, Oral, Daily    polyethylene glycol (MIRALAX) 238 g, Oral, Once, Take 238 g my mouth  Use as directed     Allergies   Allergen Reactions    Aspirin GI Intolerance       PHYSICAL EXAM:      Objective   Blood pressure 127/82, temperature (!) 97 3 °F (36 3 °C), temperature source Tympanic, height 5' (1 524 m), weight 94 8 kg (209 lb)  Body mass index is 40 82 kg/m²      General Appearance:   Alert, cooperative, no distress   HEENT:   Normocephalic, atraumatic, anicteric     Neck: Supple, symmetrical, trachea midline   Lungs:   Equal chest rise, respirations unlabored    Heart:   Regular rate and rhythm   Abdomen:   Soft, non-tender, non-distended; normal bowel sounds; no masses, no organomegaly    Rectal:   Deferred    Extremities:   No cyanosis, clubbing or edema    Neuro: Moves all 4 extremities    Skin:   No jaundice, rashes, or lesions      LAB RESULTS:     No visits with results within 1 Day(s) from this visit  Latest known visit with results is:   No results found for any previous visit  RADIOLOGY RESULTS: I have personally reviewed pertinent imaging studies  JUSTINE Lamas  Chief Gastroenterology Fellow  [facility]  Available on [email or web address]

## 2022-05-25 NOTE — PATIENT INSTRUCTIONS
We will schedule you for upper endoscopy (EGD) and colonoscopy  Please follow the instructions for the bowel prep  We will start you on Protonix  Take this medicine once a day 30 minutes before breakfast    Scheduled date of EGD/colonoscopy (as of today):  8/11/22  Physician performing EGD/colonoscopy:  Dr Lisa Mota  Location of EGD/colonoscopy:  [facility]  Desired bowel prep reviewed with patient:  Miralax/MagCitr  Instructions reviewed with patient by:Michela  Clearances:   n/a

## 2022-05-26 ENCOUNTER — HOSPITAL ENCOUNTER (OUTPATIENT)
Dept: RADIOLOGY | Facility: HOSPITAL | Age: 67
Discharge: HOME | End: 2022-05-26
Payer: COMMERCIAL

## 2022-05-26 ENCOUNTER — APPOINTMENT (OUTPATIENT)
Dept: LAB | Facility: HOSPITAL | Age: 67
End: 2022-05-26
Payer: COMMERCIAL

## 2022-05-26 DIAGNOSIS — R53.82 CHRONIC FATIGUE: ICD-10-CM

## 2022-05-26 DIAGNOSIS — M25.551 RIGHT HIP PAIN: ICD-10-CM

## 2022-05-26 DIAGNOSIS — Z11.4 ENCOUNTER FOR SCREENING FOR HIV: ICD-10-CM

## 2022-05-26 DIAGNOSIS — Z11.59 ENCOUNTER FOR HEPATITIS C SCREENING TEST FOR LOW RISK PATIENT: ICD-10-CM

## 2022-05-26 LAB
ALBUMIN SERPL BCP-MCNC: 3.7 G/DL (ref 3.5–5)
ALP SERPL-CCNC: 92 U/L (ref 46–116)
ALT SERPL W P-5'-P-CCNC: 47 U/L (ref 12–78)
ANION GAP SERPL CALCULATED.3IONS-SCNC: 1 MMOL/L (ref 4–13)
AST SERPL W P-5'-P-CCNC: 29 U/L (ref 5–45)
BILIRUB SERPL-MCNC: 0.93 MG/DL (ref 0.2–1)
BUN SERPL-MCNC: 19 MG/DL (ref 5–25)
CALCIUM SERPL-MCNC: 9.3 MG/DL (ref 8.3–10.1)
CHLORIDE SERPL-SCNC: 108 MMOL/L (ref 100–108)
CHOLEST SERPL-MCNC: 222 MG/DL
CO2 SERPL-SCNC: 30 MMOL/L (ref 21–32)
CREAT SERPL-MCNC: 0.71 MG/DL (ref 0.6–1.3)
ERYTHROCYTE [DISTWIDTH] IN BLOOD BY AUTOMATED COUNT: 13.3 % (ref 11.6–15.1)
EST. AVERAGE GLUCOSE BLD GHB EST-MCNC: 123 MG/DL
FERRITIN SERPL-MCNC: 148 NG/ML (ref 8–388)
FOLATE SERPL-MCNC: >20 NG/ML (ref 3.1–17.5)
GFR SERPL CREATININE-BSD FRML MDRD: 88 ML/MIN/1.73SQ M
GLUCOSE P FAST SERPL-MCNC: 103 MG/DL (ref 65–99)
HBA1C MFR BLD: 5.9 %
HCT VFR BLD AUTO: 42.3 % (ref 34.8–46.1)
HCV AB SER QL: NORMAL
HDLC SERPL-MCNC: 70 MG/DL
HGB BLD-MCNC: 13.8 G/DL (ref 11.5–15.4)
IRON SATN MFR SERPL: 24 % (ref 15–50)
IRON SERPL-MCNC: 86 UG/DL (ref 50–170)
LDLC SERPL CALC-MCNC: 129 MG/DL (ref 0–100)
MCH RBC QN AUTO: 29.9 PG (ref 26.8–34.3)
MCHC RBC AUTO-ENTMCNC: 32.6 G/DL (ref 31.4–37.4)
MCV RBC AUTO: 92 FL (ref 82–98)
NONHDLC SERPL-MCNC: 152 MG/DL
PLATELET # BLD AUTO: 269 THOUSANDS/UL (ref 149–390)
PMV BLD AUTO: 9.9 FL (ref 8.9–12.7)
POTASSIUM SERPL-SCNC: 4.2 MMOL/L (ref 3.5–5.3)
PROT SERPL-MCNC: 7.7 G/DL (ref 6.4–8.2)
RBC # BLD AUTO: 4.62 MILLION/UL (ref 3.81–5.12)
SODIUM SERPL-SCNC: 139 MMOL/L (ref 136–145)
T4 FREE SERPL-MCNC: 1.07 NG/DL (ref 0.76–1.46)
TIBC SERPL-MCNC: 357 UG/DL (ref 250–450)
TRIGL SERPL-MCNC: 114 MG/DL
TSH SERPL DL<=0.05 MIU/L-ACNC: 0.38 UIU/ML (ref 0.45–4.5)
VIT B12 SERPL-MCNC: 1597 PG/ML (ref 100–900)
WBC # BLD AUTO: 7.99 THOUSAND/UL (ref 4.31–10.16)

## 2022-05-26 PROCEDURE — 84439 ASSAY OF FREE THYROXINE: CPT

## 2022-05-26 PROCEDURE — 83036 HEMOGLOBIN GLYCOSYLATED A1C: CPT

## 2022-05-26 PROCEDURE — 83540 ASSAY OF IRON: CPT

## 2022-05-26 PROCEDURE — 36415 COLL VENOUS BLD VENIPUNCTURE: CPT

## 2022-05-26 PROCEDURE — 82728 ASSAY OF FERRITIN: CPT

## 2022-05-26 PROCEDURE — 80061 LIPID PANEL: CPT

## 2022-05-26 PROCEDURE — 73502 X-RAY EXAM HIP UNI 2-3 VIEWS: CPT

## 2022-05-26 PROCEDURE — 82607 VITAMIN B-12: CPT

## 2022-05-26 PROCEDURE — 82746 ASSAY OF FOLIC ACID SERUM: CPT

## 2022-05-26 PROCEDURE — 80053 COMPREHEN METABOLIC PANEL: CPT

## 2022-05-26 PROCEDURE — 83550 IRON BINDING TEST: CPT

## 2022-05-26 PROCEDURE — 85027 COMPLETE CBC AUTOMATED: CPT

## 2022-05-26 PROCEDURE — 86803 HEPATITIS C AB TEST: CPT

## 2022-05-26 PROCEDURE — 84443 ASSAY THYROID STIM HORMONE: CPT

## 2022-05-26 PROCEDURE — 87389 HIV-1 AG W/HIV-1&-2 AB AG IA: CPT

## 2022-05-27 LAB — HIV 1+2 AB+HIV1 P24 AG SERPL QL IA: NORMAL

## 2022-05-31 ENCOUNTER — PATIENT OUTREACH (OUTPATIENT)
Dept: FAMILY MEDICINE CLINIC | Facility: CLINIC | Age: 67
End: 2022-05-31

## 2022-05-31 ENCOUNTER — OFFICE VISIT (OUTPATIENT)
Dept: FAMILY MEDICINE CLINIC | Facility: CLINIC | Age: 67
End: 2022-05-31

## 2022-05-31 VITALS
TEMPERATURE: 97.2 F | DIASTOLIC BLOOD PRESSURE: 74 MMHG | HEART RATE: 72 BPM | WEIGHT: 210.2 LBS | OXYGEN SATURATION: 98 % | HEIGHT: 60 IN | RESPIRATION RATE: 16 BRPM | SYSTOLIC BLOOD PRESSURE: 124 MMHG | BODY MASS INDEX: 41.27 KG/M2

## 2022-05-31 DIAGNOSIS — M54.50 CHRONIC RIGHT-SIDED LOW BACK PAIN WITHOUT SCIATICA: ICD-10-CM

## 2022-05-31 DIAGNOSIS — M25.551 RIGHT HIP PAIN: Primary | ICD-10-CM

## 2022-05-31 DIAGNOSIS — G89.29 CHRONIC RIGHT-SIDED LOW BACK PAIN WITHOUT SCIATICA: ICD-10-CM

## 2022-05-31 PROBLEM — E78.2 MIXED HYPERLIPIDEMIA: Status: ACTIVE | Noted: 2022-05-31

## 2022-05-31 PROBLEM — R73.03 PREDIABETES: Status: ACTIVE | Noted: 2022-05-31

## 2022-05-31 PROCEDURE — 99214 OFFICE O/P EST MOD 30 MIN: CPT | Performed by: PHYSICIAN ASSISTANT

## 2022-05-31 NOTE — PROGRESS NOTES
Assessment/Plan:    Right hip pain  Right hip xray results  FINDINGS:  There is no acute fracture or dislocation  There are mild degenerative changes of the bilateral hips  No lytic or blastic osseous lesion  Foci of calcific tendinitis is present adjacent to the left greater trochanter  Degenerative changes visualized lower lumbar spine  IMPRESSION: No acute osseous abnormality  Degenerative changes as described  Results discussed with patient  Discussed using ice/heat  Voltaren gel TID   Refer to PT and ortho    Chronic right-sided low back pain without sciatica  Chronic lower back pain, no red flags  Degenerative changes seen lower lumbar spine per radiology seen on right hip xray results  Discussed ice/heat  Voltaren gel QID  Refer to PT       Prediabetes  HGBA1C 5 9 5/26/2022  Discussed following a low carb diet  Avoid sugary drinks and soda  Limit  refined carbohydrates like white bread, white rice, and white pasta  Increase fruits/vegetables  Increase exercise as tolerated  Mixed hyperlipidemia  ASCVD risk of 6 3%  Pt declined starting a statin at this time  Recommended she avoid greasy food, adding butter or oil to food, and avoidance of food high in cholesterol  Discussed exercising 3-5 days per week for minimum 30 minute  Also, discussed possibly getting a membership to a  gym with a pool for water aerobics considering right hip andchronic lower back pain      Diagnoses and all orders for this visit:    Right hip pain  -     Ambulatory Referral to Orthopedic Surgery; Future  -     Ambulatory Referral to Physical Therapy; Future  -     Diclofenac Sodium (VOLTAREN) 1 %; Apply 2 g topically 4 (four) times a day    Chronic right-sided low back pain without sciatica  -     Ambulatory Referral to Physical Therapy; Future  -     Diclofenac Sodium (VOLTAREN) 1 %; Apply 2 g topically 4 (four) times a day  All labs reviewed with patient   Taking 2 tabs of a supplement with combined vitamin B, 12, and folic acid  Advised to decrease to 1 tab po daily  Subjective:      Patient ID: Ama Rincon is a 78 y o  female presents today for 2 week f/u  Had labs and right hip xray completed on 5/26/2022  She continues with chronic right lower back and right hip pain  Pain is constant and worsens when sitting for prolong periods  Rates pain 4/10  Pain radiates down right thigh  Denies any fall or trauma  Was taking pain medication she brought back from her country [place]) which she has since run out of    Does exercises and stretches  Reports some relief  The following portions of the patient's history were reviewed and updated as appropriate: allergies, current medications, past family history, past medical history, past social history, past surgical history and problem list     Review of Systems   Constitutional: Negative for chills, fatigue and fever  Respiratory: Negative for cough, chest tightness, shortness of breath and wheezing  Cardiovascular: Negative for chest pain and palpitations  Gastrointestinal: Negative for abdominal pain, constipation, diarrhea, nausea and vomiting  Genitourinary: Negative for difficulty urinating  Musculoskeletal: Positive for arthralgias  Negative for myalgias, neck pain and neck stiffness  Skin: Negative for rash and wound  Neurological: Negative for dizziness, weakness, light-headedness, numbness and headaches  Psychiatric/Behavioral: Negative for agitation and behavioral problems  The patient is not nervous/anxious  Objective:      /74 (BP Location: Left arm, Patient Position: Sitting, Cuff Size: Large)   Pulse 72   Temp (!) 97 2 °F (36 2 °C) (Temporal)   Resp 16   Ht 5' (1 524 m)   Wt 95 3 kg (210 lb 3 2 oz)   SpO2 98%   BMI 41 05 kg/m²          Physical Exam  Constitutional:       General: She is not in acute distress  Appearance: Normal appearance  She is well-developed  She is not ill-appearing     HENT:      Head: Normocephalic and atraumatic  Eyes:      Conjunctiva/sclera: Conjunctivae normal    Cardiovascular:      Rate and Rhythm: Normal rate and regular rhythm  Heart sounds: Normal heart sounds  No murmur heard  Pulmonary:      Effort: Pulmonary effort is normal  No respiratory distress  Breath sounds: Normal breath sounds  No wheezing  Musculoskeletal:         General: No deformity  Cervical back: Normal range of motion and neck supple  Lumbar back: Tenderness (right soft tissue) present  No swelling  Negative right straight leg raise test and negative left straight leg raise test       Right hip: No tenderness  Normal range of motion  Normal strength  Left hip: Normal    Neurological:      Mental Status: She is alert and oriented to person, place, and time  Psychiatric:         Mood and Affect: Mood normal          Behavior: Behavior normal          Thought Content:  Thought content normal          Judgment: Judgment normal

## 2022-05-31 NOTE — ASSESSMENT & PLAN NOTE
Right hip xray results  FINDINGS:  There is no acute fracture or dislocation  There are mild degenerative changes of the bilateral hips  No lytic or blastic osseous lesion  Foci of calcific tendinitis is present adjacent to the left greater trochanter  Degenerative changes visualized lower lumbar spine  IMPRESSION: No acute osseous abnormality  Degenerative changes as described      Results discussed with patient  Discussed using ice/heat  Voltaren gel TID   Refer to PT and ortho

## 2022-05-31 NOTE — ASSESSMENT & PLAN NOTE
ASCVD risk of 6 3%  Pt declined starting a statin at this time  Recommended she avoid greasy food, adding butter or oil to food, and avoidance of food high in cholesterol  Discussed exercising 3-5 days per week for minimum 30 minute    Also, discussed possibly getting a membership to a  gym with a pool for water aerobics considering right hip andchronic lower back pain

## 2022-05-31 NOTE — PROGRESS NOTES
RAMEZ met with patient who had questions about Tracy Dominguez and was mailed a form by them  RAMEZ assisted patient to complete [place] Transportation [place] Information and emialed to [email or web address]  RAMEZ spoke w [employer] representative and once this completed form is processed pt will be approved and have free rides to medical appts  [facility] to make sure [facility] is [facility]  RAMEZ contacted [place] recipient line to verify [address] number  RAMEZ provided to supportive counseling  RAMEZ to follow up

## 2022-05-31 NOTE — ASSESSMENT & PLAN NOTE
Chronic lower back pain, no red flags   Degenerative changes seen lower lumbar spine per radiology seen on right hip xray results  Discussed ice/heat  Voltaren gel QID  Refer to PT

## 2022-05-31 NOTE — ASSESSMENT & PLAN NOTE
HGBA1C 5 9 5/26/2022  Discussed following a low carb diet  Avoid sugary drinks and soda  Limit  refined carbohydrates like white bread, white rice, and white pasta  Increase fruits/vegetables  Increase exercise as tolerated

## 2022-06-04 ENCOUNTER — TELEPHONE (OUTPATIENT)
Dept: OTHER | Facility: OTHER | Age: 67
End: 2022-06-04

## 2022-06-07 ENCOUNTER — PATIENT OUTREACH (OUTPATIENT)
Dept: FAMILY MEDICINE CLINIC | Facility: CLINIC | Age: 67
End: 2022-06-07

## 2022-06-07 NOTE — PROGRESS NOTES
SWCM outreached [phone] to follow up and confirm that [employer] form that was emailed on 5/3 was received and if pt has been approved for [employer]  No  at customer service, VM left  SWCM to follow

## 2022-06-15 ENCOUNTER — TELEPHONE (OUTPATIENT)
Dept: OTHER | Facility: OTHER | Age: 67
End: 2022-06-15

## 2022-06-20 ENCOUNTER — PATIENT OUTREACH (OUTPATIENT)
Dept: FAMILY MEDICINE CLINIC | Facility: CLINIC | Age: 67
End: 2022-06-20

## 2022-06-20 NOTE — PROGRESS NOTES
CORBY NINO contacted Anne Chris at [phone] extension #[phone] to follow up regarding patient's [employer] application  Patient was previously approved for Anne [employer] temporary program from 5/11/22- 7/10/22  Anne Chris representative noted that they not received a [place] Transportation Information Program form as requested  CORBY NINO noted that this was previously submitted by CORBY Chris representative requested that a new copy be mailed to CORBY NINO to then complete and either fax or mail back to their office prior to 7/10/22  CORBY NINO will await this paper in the mail to complete  CORBY NINO will continue to follow and provide psychosocial support as necessary

## 2022-06-27 ENCOUNTER — PATIENT OUTREACH (OUTPATIENT)
Dept: FAMILY MEDICINE CLINIC | Facility: CLINIC | Age: 67
End: 2022-06-27

## 2022-06-27 NOTE — PROGRESS NOTES
CORBY NINO received Medical Assistance Transportation Program Information [facility] in the mail for patient to complete  CORBY NINO completed form, however, it requires a signature from patient  CORBY NINO contacted patient to discuss need to obtain signature  CORBY NINO utilized [place] #[id number]  Patient did not answer the phone  CORBY NINO left a voicemail requesting a return call  CORBY NINO will continue to follow and provided psychosocial support as necessary

## 2022-06-28 ENCOUNTER — PATIENT OUTREACH (OUTPATIENT)
Dept: FAMILY MEDICINE CLINIC | Facility: CLINIC | Age: 67
End: 2022-06-28

## 2022-06-28 NOTE — PROGRESS NOTES
CORBY NINO received Medical Assistance Transportation Program Information [facility] the mail for patient to complete  CORBY NINO completed form, however, it requires a signature from patient  CORBY NINO contacted patient to discuss need to obtain signature  CORBY NINO utilized [place] #[id number]  Patient did not answer the phone  CORBY NINO left a voicemail requesting a return call  CORBY NINO will continue to follow and provided psychosocial support as necessary

## 2022-06-29 ENCOUNTER — PATIENT OUTREACH (OUTPATIENT)
Dept: FAMILY MEDICINE CLINIC | Facility: CLINIC | Age: 67
End: 2022-06-29

## 2022-06-29 NOTE — PROGRESS NOTES
CORBY NINO contacted patient after receiving a return call from her regarding Orquidea Garza  CORBY NINO contacted patient via [facility] #[id number]  CORBY NINO explained Medical Assistance Transportation Program Information form that needs to be signed  Patient noted she is available to stop by the office tomorrow to sign the paperwork  CORBY NINO informed patient that she is available throughout the day to meet with patient to sign the form  CORBY NINO will meet with patient tomorrow to complete the form  Patient denied any further needs at this time  CORBY NINO will continue to follow and provide psychosocial support as necessary

## 2022-06-29 NOTE — PROGRESS NOTES
CORBY NINO received a return voicemail from patient regarding [address] transportation paperwork  CORBY NINO called back patient via [employer] #[id number]  Patient did not answer the phone  CORBY NINO left a voicemail requesting a return call  CORBY NINO will continue to follow and provide psychosocial support as necessary

## 2022-06-30 ENCOUNTER — PATIENT OUTREACH (OUTPATIENT)
Dept: FAMILY MEDICINE CLINIC | Facility: CLINIC | Age: 67
End: 2022-06-30

## 2022-06-30 NOTE — PROGRESS NOTES
Patient stopped by office to sign Medical Assistance Transportation Program Information form for Phillip TAVAREZ CM then emailed copy to Phillip Montes at [email or web address]  CORBY NINO will follow up in a fews days to confirm that the paperwork was approved and processed  Patient denied any further needs at this time  CORBY NINO will continue to follow and provide psychosocial support as necessary

## 2022-07-01 ENCOUNTER — HOSPITAL ENCOUNTER (OUTPATIENT)
Dept: RADIOLOGY | Age: 67
Discharge: HOME | End: 2022-07-01
Payer: COMMERCIAL

## 2022-07-01 VITALS — WEIGHT: 210 LBS | BODY MASS INDEX: 41.23 KG/M2 | HEIGHT: 60 IN

## 2022-07-01 DIAGNOSIS — Z12.31 ENCOUNTER FOR SCREENING MAMMOGRAM FOR MALIGNANT NEOPLASM OF BREAST: ICD-10-CM

## 2022-07-01 PROCEDURE — 77063 BREAST TOMOSYNTHESIS BI: CPT

## 2022-07-01 PROCEDURE — 77067 SCR MAMMO BI INCL CAD: CPT

## 2022-07-05 ENCOUNTER — PATIENT OUTREACH (OUTPATIENT)
Dept: FAMILY MEDICINE CLINIC | Facility: CLINIC | Age: 67
End: 2022-07-05

## 2022-07-05 NOTE — PROGRESS NOTES
CORBY NINO contacted [employer] to confirm that the [place] form was received and approved  Leroy from [employer] confirmed that patient is now covered for free medical trips  Leroy reported that patient can inform transportation  when scheduling appointments that it is a medical appointment and it will be billed under the [place] transportation program    CORBY NINO attempted to contact patient regarding same  CORBY NINO utilized [employer] #[id number]  Patient did not answer the phone  CORBY NINO left a voicemail requesting a return call  CORBY NINO will continue to be available to provide psychosocial support as necessary

## 2022-07-07 ENCOUNTER — PATIENT OUTREACH (OUTPATIENT)
Dept: FAMILY MEDICINE CLINIC | Facility: CLINIC | Age: 67
End: 2022-07-07

## 2022-07-07 NOTE — PROGRESS NOTES
CORBY NINO outreached patient regarding Soha Martinez approval  CORBY NINO utilized [employer]  #[phone]  Patient was informed that her Soha Martinez was approved  CORBY NINO explained how to call Soha Martinez to make a reservation and also explained that rides to appointments for medical reasons would be covered for free  Patient was thankful and receptive  Patient asked for information regarding Dental Care and Optomology  CORBY NINO provided her with information for [address] location for dental care  CORBY NINO suggested that patient call her insurance card to determine an Optometrist that her insurance is in network with  Patient then reported that she has an [address] [facility] appointment on 7/22/22  She requested medication for pain that she is currently experiencing  CORBY NINO explained that this would be prescribed by [facility] and it is best to call them to inquire further about a prescription  Patient was understanding  Patient denied any further needs at this time  This case will be closed as patient goals have been met  CORBY NINO will continue to be available to provide support as necessary

## 2022-08-04 ENCOUNTER — HOSPITAL ENCOUNTER (OUTPATIENT)
Dept: MAMMOGRAPHY | Facility: CLINIC | Age: 67
Discharge: HOME | End: 2022-08-04
Payer: COMMERCIAL

## 2022-08-04 ENCOUNTER — HOSPITAL ENCOUNTER (OUTPATIENT)
Dept: ULTRASOUND IMAGING | Facility: CLINIC | Age: 67
Discharge: HOME | End: 2022-08-04
Payer: COMMERCIAL

## 2022-08-04 VITALS — BODY MASS INDEX: 41.12 KG/M2 | WEIGHT: 209.44 LBS | HEIGHT: 60 IN

## 2022-08-04 DIAGNOSIS — R92.8 ABNORMAL SCREENING MAMMOGRAM: ICD-10-CM

## 2022-08-04 PROCEDURE — G0279 TOMOSYNTHESIS, MAMMO: HCPCS

## 2022-08-04 PROCEDURE — 77065 DX MAMMO INCL CAD UNI: CPT

## 2022-08-04 PROCEDURE — 76642 ULTRASOUND BREAST LIMITED: CPT

## 2022-08-11 ENCOUNTER — ANESTHESIA EVENT (OUTPATIENT)
Dept: GASTROENTEROLOGY | Facility: HOSPITAL | Age: 67
End: 2022-08-11

## 2022-08-11 ENCOUNTER — HOSPITAL ENCOUNTER (OUTPATIENT)
Dept: GASTROENTEROLOGY | Facility: HOSPITAL | Age: 67
Setting detail: OUTPATIENT SURGERY
Discharge: HOME | End: 2022-08-11
Attending: INTERNAL MEDICINE | Admitting: INTERNAL MEDICINE
Payer: COMMERCIAL

## 2022-08-11 ENCOUNTER — ANESTHESIA (OUTPATIENT)
Dept: GASTROENTEROLOGY | Facility: HOSPITAL | Age: 67
End: 2022-08-11

## 2022-08-11 VITALS
RESPIRATION RATE: 16 BRPM | HEART RATE: 63 BPM | TEMPERATURE: 96.8 F | OXYGEN SATURATION: 96 % | SYSTOLIC BLOOD PRESSURE: 117 MMHG | DIASTOLIC BLOOD PRESSURE: 64 MMHG

## 2022-08-11 DIAGNOSIS — K21.9 GASTROESOPHAGEAL REFLUX DISEASE, UNSPECIFIED WHETHER ESOPHAGITIS PRESENT: ICD-10-CM

## 2022-08-11 DIAGNOSIS — R13.19 OTHER DYSPHAGIA: ICD-10-CM

## 2022-08-11 DIAGNOSIS — Z12.11 COLON CANCER SCREENING: ICD-10-CM

## 2022-08-11 PROCEDURE — 45385 COLONOSCOPY W/LESION REMOVAL: CPT | Performed by: INTERNAL MEDICINE

## 2022-08-11 PROCEDURE — 43239 EGD BIOPSY SINGLE/MULTIPLE: CPT | Performed by: INTERNAL MEDICINE

## 2022-08-11 PROCEDURE — 88305 TISSUE EXAM BY PATHOLOGIST: CPT | Performed by: PATHOLOGY

## 2022-08-11 PROCEDURE — 45380 COLONOSCOPY AND BIOPSY: CPT | Performed by: INTERNAL MEDICINE

## 2022-08-11 RX ORDER — LIDOCAINE HYDROCHLORIDE 10 MG/ML
INJECTION, SOLUTION EPIDURAL; INFILTRATION; INTRACAUDAL; PERINEURAL AS NEEDED
Status: DISCONTINUED | OUTPATIENT
Start: 2022-08-11 | End: 2022-08-11

## 2022-08-11 RX ORDER — MIDAZOLAM HYDROCHLORIDE 2 MG/2ML
INJECTION, SOLUTION INTRAMUSCULAR; INTRAVENOUS AS NEEDED
Status: DISCONTINUED | OUTPATIENT
Start: 2022-08-11 | End: 2022-08-11

## 2022-08-11 RX ORDER — PROPOFOL 10 MG/ML
INJECTION, EMULSION INTRAVENOUS AS NEEDED
Status: DISCONTINUED | OUTPATIENT
Start: 2022-08-11 | End: 2022-08-11

## 2022-08-11 RX ORDER — PROPOFOL 10 MG/ML
INJECTION, EMULSION INTRAVENOUS CONTINUOUS PRN
Status: DISCONTINUED | OUTPATIENT
Start: 2022-08-11 | End: 2022-08-11

## 2022-08-11 RX ORDER — SODIUM CHLORIDE 9 MG/ML
INJECTION, SOLUTION INTRAVENOUS CONTINUOUS PRN
Status: DISCONTINUED | OUTPATIENT
Start: 2022-08-11 | End: 2022-08-11

## 2022-08-11 RX ADMIN — PROPOFOL 20 MG: 10 INJECTION, EMULSION INTRAVENOUS at 10:50

## 2022-08-11 RX ADMIN — LIDOCAINE HYDROCHLORIDE 100 MG: 10 INJECTION, SOLUTION EPIDURAL; INFILTRATION; INTRACAUDAL; PERINEURAL at 10:42

## 2022-08-11 RX ADMIN — SODIUM CHLORIDE: 0.9 INJECTION, SOLUTION INTRAVENOUS at 10:45

## 2022-08-11 RX ADMIN — PROPOFOL 30 MG: 10 INJECTION, EMULSION INTRAVENOUS at 10:47

## 2022-08-11 RX ADMIN — MIDAZOLAM 2 MG: 1 INJECTION INTRAMUSCULAR; INTRAVENOUS at 10:43

## 2022-08-11 RX ADMIN — PROPOFOL 100 MCG/KG/MIN: 10 INJECTION, EMULSION INTRAVENOUS at 10:48

## 2022-08-11 NOTE — ANESTHESIA POSTPROCEDURE EVALUATION
Post-Op Assessment Note    CV Status:  Stable  Pain Score: 0    Pain management: adequate     Mental Status:  Sleepy   Hydration Status:  Stable   PONV Controlled:  None   Airway Patency:  Patent      Post Op Vitals Reviewed: Yes      Staff: CRNA         No complications documented      BP (!) 89/51 (08/11/22 1155)    Temp (!) 96 8 °F (36 °C) (08/11/22 1155)    Pulse 59 (08/11/22 1155)   Resp   11   SpO2 98 % (08/11/22 1155)

## 2022-08-11 NOTE — ANESTHESIA PREPROCEDURE EVALUATION
Procedure:  EGD  COLONOSCOPY    Relevant Problems   CARDIO   (+) Mixed hyperlipidemia      GI/HEPATIC   (+) GERD (gastroesophageal reflux disease)   (+) Other dysphagia      MUSCULOSKELETAL   (+) Chronic right-sided low back pain without sciatica      NEURO/PSYCH   (+) Chronic right-sided low back pain without sciatica        Physical Exam    Airway    Mallampati score: II  TM Distance: >3 FB  Neck ROM: full     Dental       Cardiovascular  Cardiovascular exam normal    Pulmonary  Pulmonary exam normal     Other Findings        Anesthesia Plan  ASA Score- 3     Anesthesia Type- IV sedation with anesthesia with ASA Monitors  Additional Monitors:   Airway Plan:           Plan Factors-Exercise tolerance (METS): <4 METS  Chart reviewed  EKG reviewed  Imaging results reviewed  Existing labs reviewed  Patient summary reviewed  Patient is not a current smoker  Patient not instructed to abstain from smoking on day of procedure  Patient did not smoke on day of surgery  Induction- intravenous  Postoperative Plan-     Informed Consent- Anesthetic plan and risks discussed with patient  I personally reviewed this patient with the CRNA  Discussed and agreed on the Anesthesia Plan with the CRNA  Karrie Nissen

## 2022-08-11 NOTE — H&P
History and Physical -  [facility]  Reno Roche 78 y o  female MRN: [id number]    HPI: Reno Roche is a 78y o  year old female with dysphagia presenting for EGD and screening colonoscopy  Prior EGD and colonoscopy 6 years ago (records not available) but per prior office visit note, EGD with dilation performed and normal colonic biopsies  Last Hg   Lab Results   Component Value Date    HGB 13 8 2022          Review of Systems    Historical Information   Past Medical History:   Diagnosis Date    GERD (gastroesophageal reflux disease)      Past Surgical History:   Procedure Laterality Date     SECTION      HAND SURGERY       Social History   Social History     Substance and Sexual Activity   Alcohol Use Never     Social History     Substance and Sexual Activity   Drug Use Never     Social History     Tobacco Use   Smoking Status Never Smoker   Smokeless Tobacco Never Used     Family History   Problem Relation Age of Onset    No Known Problems Mother     Cancer Father     Stomach cancer Father         mets; unknown age- at 37    No Known Problems Daughter     No Known Problems Maternal Grandmother     No Known Problems Maternal Grandfather     No Known Problems Paternal Grandmother     No Known Problems Paternal Grandfather     No Known Problems Son     No Known Problems Son     No Known Problems Maternal Aunt     No Known Problems Maternal Aunt     No Known Problems Maternal Aunt     No Known Problems Maternal Aunt     No Known Problems Maternal Aunt     No Known Problems Maternal Aunt     No Known Problems Maternal Aunt        Meds/Allergies     (Not in a hospital admission)      Allergies   Allergen Reactions    Aspirin GI Intolerance       Objective     /60   Pulse 66   Temp (!) 97 1 °F (36 2 °C) (Tympanic)   Resp 16   SpO2 97%       PHYSICAL EXAM    Gen: NAD  CV: RRR  CHEST: Clear  ABD: soft, NT/ND  EXT: no edema  Neuro: AAO      ASSESSMENT/PLAN:  This is a 78y o  year old female here for EGD for dysphagia and colonoscopy for CRC screening    Plan/Procedure: EGD, Colonoscopy

## 2022-08-19 NOTE — RESULT ENCOUNTER NOTE
My medical assistant will call Ms Ballard with her results  Polyps were tubular adenomas as expected  No change to plan for repeat colonoscopy in 3 years

## 2022-10-11 PROBLEM — Z00.00 HEALTHCARE MAINTENANCE: Status: RESOLVED | Noted: 2022-05-16 | Resolved: 2022-10-11

## 2022-10-11 PROBLEM — Z12.11 COLON CANCER SCREENING: Status: RESOLVED | Noted: 2022-05-25 | Resolved: 2022-10-11

## 2022-10-11 PROBLEM — H61.23 EXCESSIVE CERUMEN IN BOTH EAR CANALS: Status: RESOLVED | Noted: 2022-05-16 | Resolved: 2022-10-11

## 2022-12-27 ENCOUNTER — OFFICE VISIT (OUTPATIENT)
Dept: SLEEP CENTER | Facility: CLINIC | Age: 67
End: 2022-12-27

## 2022-12-27 VITALS
SYSTOLIC BLOOD PRESSURE: 106 MMHG | OXYGEN SATURATION: 95 % | DIASTOLIC BLOOD PRESSURE: 58 MMHG | BODY MASS INDEX: 37.36 KG/M2 | HEART RATE: 71 BPM | WEIGHT: 203 LBS | HEIGHT: 62 IN

## 2022-12-27 DIAGNOSIS — R53.82 CHRONIC FATIGUE: ICD-10-CM

## 2022-12-27 DIAGNOSIS — G47.33 OBSTRUCTIVE SLEEP APNEA-HYPOPNEA SYNDROME: Primary | ICD-10-CM

## 2022-12-27 DIAGNOSIS — G47.19 EXCESSIVE DAYTIME SLEEPINESS: ICD-10-CM

## 2022-12-27 NOTE — PATIENT INSTRUCTIONS
1  Schedule diagnostic sleep study   3  Schedule DME appointment for CPAP equipment, if needed  4  Schedule follow up visit for CPAP compliance and recheck         Nursing Support:  When: Monday through Friday 7A-5PM except holidays  Where: Our direct line is [phone]  If you are having a true emergency please call 911  In the event that the line is busy or it is after hours please leave a voice message and we will return your call  Please speak clearly, leaving your full name, birth date, best number to reach you and the reason for your call  Medication refills: We will need the name of the medication, the dosage, the ordering provider, whether you get a 30 or 90 day refill, and the pharmacy name and address  Medications will be ordered by the provider only  Nurses cannot call in prescriptions  Please allow 7 days for medication refills  Physician requested updates: If your provider requested that you call with an update after starting medication, please be ready to provide us the medication and dosage, what time you take your medication, the time you attempt to fall asleep, time you fall asleep, when you wake up, and what time you get out of bed  Sleep Study Results: We will contact you with sleep study results and/or next steps after the physician has reviewed your testing

## 2022-12-27 NOTE — PROGRESS NOTES
[address], 1955, MRN: [id number]    12/27/2022        Reason for Consult / Principal Problem:    Evaluation of possible Obstructive Sleep Apnea  Excessive daytime sleepiness       Thank you for the opportunity of participating in the evaluation and care of this patient in the Sleep Clinic at [facility]  Subjective:     HPI: Elisabet Tracey is a 75y o  year old female  She presents with her daughter, who translates at her request, for a consultation regarding evaluation of possible obstructive sleep apnea  She reports that she snores loudly and awakens a few times during the night, often for urination  She discussed her symptoms with her PCP and was referred for further evaluation  Comorbid conditions:  Obesity  GERD  Chronic fatigue        Review of Systems      Genitourinary hot flashes at night and post menopausal (no peroids)   Cardiology Frequent chest pain or angina,  and ankle/leg swelling   Gastrointestinal none   Neurology awaken with headache, muscle weakness, numbness/tingling of an extremity, difficulty with memory and balance problems   Constitutional none   Integumentary none   Psychiatry none   Musculoskeletal joint pain, muscle aches, back pain, sciatica and leg cramps   Pulmonary shortness of breath with activity, frequent cough and snoring   ENT none   Endocrine none   Hematological none       Sleep Study Results:  No prior sleep study    Employment:  She is not currently working outside of the home  She reports that she never worked night shift  Sleep Schedule:       Bedtime:  11:00pm, sometimes as early as 10:00pm      Latency: It can take up to 2-3 hours to initiate sleep      Wakeup time:  Wakes up around 7:00am, but gets out of bed around 8:00am    Awakenings:       Frequency:  1-2 times per night      Causes:   For urination      Duration:  Returns to sleep within a brief period of time    Daytime Sleepiness / Inappropriate Sleep:       Most severe:  She feels very tired around 2:00pm       Naps :  Daily       Time:  Approximately 2:00pm      Duration:  Up to 3 hours, naps are refreshing       Inappropriate drowsiness / sleep:  She dozes with sedentary activities, such as watching TV or movies, or reading    Snoring:  She snores loudly    Apnea:  No reports of witnessed apnea    Change in Weight:  She lost 10 lbs 4 months ago and has maintained this weight    Restless Leg Syndrome:  She has clinical symptoms consistent with this diagnosis, but feels they may be related to sciatica  Other Complaints:  She talks in her sleep  No reports of sleep walking or hallucinations surrounding sleep  She experiences sleep paralysis, typically associated with a nightmare  These episodes have been ongoing for many years and occur approximately once every 2 weeks  She awakens with headaches in the morning  She takes tylenol right away and headaches resolve  She reports bruxism and does not use any oral appliance  Social History:      Caffeine:  16 ounces of coffee and 8 ounces of tea most days       Tobacco:   reports that she has never smoked  She has never used smokeless tobacco      E-cig/Vaping:    E-Cigarette/Vaping   • E-Cigarette Use Never User       E-Cigarette/Vaping Substances   • Nicotine No    • THC No    • CBD No    • Flavoring No    • Other No    • Unknown No          Alcohol:   reports no history of alcohol use  rarely     Drugs:   reports no history of drug use         The review of systems and following portions of the patient's history were reviewed and updated as appropriate: allergies, current medications, past family history, past medical history, past social history, past surgical history and problem list         Objective:       Vitals:    12/27/22 1300   BP: 106/58   Pulse: 71   SpO2: 95%   Weight: 92 1 kg (203 lb)   Height: 5' 1 52" (1 563 m)     Body mass index is 37 71 kg/m²  Neck Circumference: 14  [place] Sleepiness Scale: Total score: 19      Current Outpatient Medications:   •  carbamide peroxide (DEBROX) 6 5 % otic solution, Administer 5 drops into both ears in the morning and 5 drops in the evening  (Patient not taking: Reported on 5/25/2022), Disp: 15 mL, Rfl: 0  •  magnesium citrate (CITROMA) 1 745 g/30 mL oral solution, Take 296 mL by mouth once for 1 dose, Disp: 296 mL, Rfl: 0  •  omeprazole (PriLOSEC) 40 MG capsule, Take 1 capsule (40 mg total) by mouth daily (Patient not taking: Reported on 12/27/2022), Disp: 30 capsule, Rfl: 2  •  polyethylene glycol (MiraLax) 17 GM/SCOOP powder, Take 238 g by mouth once for 1 dose Take 238 g my mouth  Use as directed, Disp: 238 g, Rfl: 0    Physical Exam  General Appearance:   Alert, cooperative, no distress, appears stated age, obese     Head:   Normocephalic, without obvious abnormality, atraumatic     Eyes:   Conjunctiva/corneas clear          Nose:  Nares normal, septum midline, mucosa normal, no drainage or sinus tenderness           Throat:  Lips, teeth and gums normal; tongue normal in size and midline in position; mucosa moist with low-lying soft palatal tissue, uvula barely visualized, tonsils not visualized, Mallampati class 4       Neck:  Supple, symmetrical, trachea midline, no adenopathy; no thyromegaly noted, no carotid bruit or JVD     Lungs:      Clear to auscultation bilaterally, respirations unlabored     Heart:   Regular rate and rhythm, S1 and S2 normal, no murmur, rub or gallop       Extremities:  Extremities normal, atraumatic, no cyanosis and trace edema in LE bilaterally       Skin:  Skin color, texture, turgor normal, no rashes or lesions       Neurologic:  No focal deficits noted  ASSESSMENT / PLAN     1  Obstructive sleep apnea-hypopnea syndrome  Diagnostic Sleep Study      2  Excessive daytime sleepiness  Diagnostic Sleep Study      3   Chronic fatigue  Ambulatory Referral to Sleep Medicine            Counseling / Coordination of Care  Total clinic time spent today 55 minutes  Greater than 50% of total time was spent with the patient and / or family counseling and / or coordination of care  A description of the counseling / coordination of care:     diagnostic results, instructions for management, risk factor reductions, prognosis, patient and family education, impressions, risks and benefits of treatment options and importance of compliance with treatment    Today we discussed the anatomy and physiology of the upper airway  I pointed out how changes in this region can result in both snoring and abnormal breathing events including apneas and hypopneas  I explained the most common co-morbidities of untreated sleep apnea  After this we talked about some forms of treatment including application of positive airway pressure, mandibular advancement devices and surgery  She would consider use of CPAP equipment if CHEMO is confirmed  In order to evaluate the possibility of Obstructive Sleep Apnea as a cause of the patient's symptoms, a diagnostic sleep study will be completed to identify the presence or absence of abnormal nocturnal breathing  If significant abnormal nocturnal breathing is detected, nasal CPAP will be titrated to find the optimum pressure needed to maintain upper airway patency during sleep  Following testing, the patient will return to the Sleep [address] [place] [address] for set up of CPAP equipment, followed by a compliance follow up visit 31-91 days after the set up  The study details will be reviewed in detail at that time  The following instructions have been given to the patient today:    Patient Instructions   1  Schedule diagnostic sleep study   3  Schedule DME appointment for CPAP equipment, if needed  4   Schedule follow up visit for CPAP compliance and recheck         Nursing Support:  When: Monday through Friday 7A-5PM except holidays  Where: Our direct line is [phone]  If you are having a true emergency please call 911  In the event that the line is busy or it is after hours please leave a voice message and we will return your call  Please speak clearly, leaving your full name, birth date, best number to reach you and the reason for your call  Medication refills: We will need the name of the medication, the dosage, the ordering provider, whether you get a 30 or 90 day refill, and the pharmacy name and address  Medications will be ordered by the provider only  Nurses cannot call in prescriptions  Please allow 7 days for medication refills  Physician requested updates: If your provider requested that you call with an update after starting medication, please be ready to provide us the medication and dosage, what time you take your medication, the time you attempt to fall asleep, time you fall asleep, when you wake up, and what time you get out of bed  Sleep Study Results: We will contact you with sleep study results and/or next steps after the physician has reviewed your testing  Peg Mccauley, [address] [place] [address]      Portions of the record may have been created with voice recognition software  Occasional wrong word or "sound a like" substitutions may have occurred due to the inherent limitations of voice recognition software  Read the chart carefully and recognize, using context, where substitutions have occurred

## 2022-12-30 ENCOUNTER — HOSPITAL ENCOUNTER (OUTPATIENT)
Dept: SLEEP CENTER | Facility: CLINIC | Age: 67
Discharge: HOME | End: 2022-12-30

## 2022-12-30 DIAGNOSIS — G47.33 OBSTRUCTIVE SLEEP APNEA-HYPOPNEA SYNDROME: ICD-10-CM

## 2022-12-30 DIAGNOSIS — G47.19 EXCESSIVE DAYTIME SLEEPINESS: ICD-10-CM

## 2022-12-31 NOTE — PROGRESS NOTES
Sleep Study Documentation    Pre-Sleep Study       Sleep testing procedure explained to patient:YES    Patient napped prior to study:NO    [address] [place] worker after 12PM   Caffeine use:NO    Alcohol:Dayshift workers after 5PM: Alcohol use:NO    Typical day for patient:YES       Study Documentation    Sleep Study Indications: Snoring, EDS, Chronic or AM headaches, unrefreshing sleep, impaired concentration/memory    Sleep Study: Diagnostic   Snore: Moderate  Supplemental O2: no    O2 flow rate (L/min)    O2 flow rate (L/min)   Minimum SaO2 81%  Baseline SaO2 96%        EKG abnormalities: no     EEG abnormalities: no    Sleep Study Recorded < 2 hours: N/A    Sleep Study Recorded > 2 hours but incomplete study: N/A    Sleep Study Recorded 6 hours but no sleep obtained: NO    Patient classification: retired       Post-Sleep Study    Medication used at bedtime or during sleep study:NO    Patient reports time it took to fall asleep:20 to 30 minutes    Patient reports waking up during study:3 or more times  Patient reports returning to sleep in 10 to 30 minutes  Patient reports sleeping 4 to 6 hours without dreaming  Patient reports sleep during study:typical    Patient rated sleepiness: Very sleepy or tired    PAP treatment:no

## 2023-01-04 DIAGNOSIS — G47.33 OBSTRUCTIVE SLEEP APNEA-HYPOPNEA SYNDROME: Primary | ICD-10-CM

## 2023-01-04 DIAGNOSIS — G47.19 EXCESSIVE DAYTIME SLEEPINESS: ICD-10-CM

## 2023-01-09 ENCOUNTER — TELEPHONE (OUTPATIENT)
Dept: SLEEP CENTER | Facility: CLINIC | Age: 68
End: 2023-01-09

## 2023-01-09 NOTE — TELEPHONE ENCOUNTER
Left message for patient to call office to review sleep study results  Needs to schedule  DME set up  Has compliance follow up 4/4/23

## 2023-01-11 NOTE — TELEPHONE ENCOUNTER
#[id number]  Returned call to patient  Advised that diagnostic sleep study shows mild CHEMO and the provider has placed an order for CPAP  DME scheduled 1/25/2023 in [place]  Compliance follow-up scheduled 4/4/2023 with LEAH Craig in the [place] office  Rx for CPAP and clinicals sent to [facility] via [place]

## 2023-01-12 LAB
DME PARACHUTE DELIVERY DATE REQUESTED: NORMAL
DME PARACHUTE DELIVERY NOTE: NORMAL
DME PARACHUTE ITEM DESCRIPTION: NORMAL
DME PARACHUTE ORDER STATUS: NORMAL
DME PARACHUTE SUPPLIER NAME: NORMAL
DME PARACHUTE SUPPLIER PHONE: NORMAL

## 2023-01-25 LAB
DME PARACHUTE DELIVERY DATE ACTUAL: NORMAL
DME PARACHUTE DELIVERY DATE EXPECTED: NORMAL
DME PARACHUTE DELIVERY DATE REQUESTED: NORMAL
DME PARACHUTE DELIVERY NOTE: NORMAL
DME PARACHUTE ITEM DESCRIPTION: NORMAL
DME PARACHUTE ORDER STATUS: NORMAL
DME PARACHUTE SUPPLIER NAME: NORMAL
DME PARACHUTE SUPPLIER PHONE: NORMAL

## 2023-01-31 ENCOUNTER — TELEPHONE (OUTPATIENT)
Dept: SLEEP CENTER | Facility: CLINIC | Age: 68
End: 2023-01-31

## 2023-02-06 ENCOUNTER — HOSPITAL ENCOUNTER (OUTPATIENT)
Dept: MAMMOGRAPHY | Facility: CLINIC | Age: 68
Discharge: HOME | End: 2023-02-06

## 2023-02-06 DIAGNOSIS — R92.8 ABNORMAL MAMMOGRAM: ICD-10-CM

## 2023-03-29 ENCOUNTER — OFFICE VISIT (OUTPATIENT)
Dept: FAMILY MEDICINE CLINIC | Facility: CLINIC | Age: 68
End: 2023-03-29

## 2023-03-29 VITALS
OXYGEN SATURATION: 95 % | DIASTOLIC BLOOD PRESSURE: 61 MMHG | HEART RATE: 65 BPM | WEIGHT: 209 LBS | SYSTOLIC BLOOD PRESSURE: 102 MMHG | HEIGHT: 60 IN | BODY MASS INDEX: 41.03 KG/M2 | RESPIRATION RATE: 17 BRPM | TEMPERATURE: 97.6 F

## 2023-03-29 DIAGNOSIS — R30.0 DYSURIA: Primary | ICD-10-CM

## 2023-03-29 PROBLEM — R10.2 VAGINAL PAIN: Status: ACTIVE | Noted: 2023-03-29

## 2023-03-29 LAB
AMORPH URATE CRY URNS QL MICRO: ABNORMAL
BACTERIA UR QL AUTO: ABNORMAL /HPF
BILIRUB UR QL STRIP: NEGATIVE
CLARITY UR: CLEAR
COLOR UR: ABNORMAL
GLUCOSE UR STRIP-MCNC: NEGATIVE MG/DL
HGB UR QL STRIP.AUTO: NEGATIVE
KETONES UR STRIP-MCNC: NEGATIVE MG/DL
LEUKOCYTE ESTERASE UR QL STRIP: NEGATIVE
MUCOUS THREADS UR QL AUTO: ABNORMAL
NITRITE UR QL STRIP: POSITIVE
NON-SQ EPI CELLS URNS QL MICRO: ABNORMAL /HPF
PH UR STRIP.AUTO: 6.5 [PH]
PROT UR STRIP-MCNC: NEGATIVE MG/DL
RBC #/AREA URNS AUTO: ABNORMAL /HPF
SP GR UR STRIP.AUTO: 1.02 (ref 1–1.03)
UROBILINOGEN UR STRIP-ACNC: <2 MG/DL
WBC #/AREA URNS AUTO: ABNORMAL /HPF

## 2023-03-29 NOTE — PROGRESS NOTES
Name: Diann Garcia      : 1955      MRN: [id number]  Encounter Provider: Myron Ceja MD  Encounter Date: 3/29/2023   Encounter department: [address] [place] [address]  Dysuria  Assessment & Plan:  Patient reporting several weeks of dysuria, smelly urine and urinary frequency  Patient denies any fevers chills, nausea, vomiting or diarrhea  + Abdominal pain  Patient reports she is sexually active with multiple believes she has any STDs  Patient denies any vaginal bleeding, vaginal itching but does note some vaginal discharge  On vaginal exam mild tenderness discharge was noted  No vaginal bumps, or erythema noted  Plan  -UA sent out  - Affirm sent out   -Patient declined any gonorrhea/chlamydia, HIV or syphilis testing  -We will follow-up with patient in 1 week joint pain and will discuss results (if available ) and symptoms at that time     Orders:  -     UA w Reflex to Microscopic w Reflex to Culture - Clinic Collect  -     VAGINOSIS DNA PROBE (AFFIRM); Future  -     VAGINOSIS DNA PROBE (AFFIRM)         Subjective      Patient presents with several weeks of worsening urinary frequency and dysuria  Is also stating that she has noticed foul-smelling urine and some vaginal discharge  Patient denies any fevers, chills, nausea, vomiting, diarrhea or constipation  She is also reporting lower abdominal pain that is not present on exam today  Review of Systems   Constitutional: Negative for chills and fatigue  Respiratory: Negative for chest tightness and shortness of breath  Gastrointestinal: Positive for abdominal pain  Endocrine: Negative for polydipsia  Genitourinary: Positive for dysuria, urgency and vaginal discharge  Negative for dyspareunia, hematuria, vaginal bleeding and vaginal pain  Neurological: Negative for dizziness and light-headedness         Current Outpatient Medications on File Prior to Visit   Medication Sig "  • carbamide peroxide (DEBROX) 6 5 % otic solution Administer 5 drops into both ears in the morning and 5 drops in the evening  (Patient not taking: Reported on 5/25/2022)   • magnesium citrate (CITROMA) 1 745 g/30 mL oral solution Take 296 mL by mouth once for 1 dose (Patient not taking: Reported on 3/29/2023)   • omeprazole (PriLOSEC) 40 MG capsule Take 1 capsule (40 mg total) by mouth daily (Patient not taking: Reported on 12/27/2022)   • polyethylene glycol (MiraLax) 17 GM/SCOOP powder Take 238 g by mouth once for 1 dose Take 238 g my mouth  Use as directed (Patient not taking: Reported on 3/29/2023)       Objective     /61 (BP Location: Left arm, Patient Position: Sitting, Cuff Size: Large)   Pulse 65   Temp 97 6 °F (36 4 °C) (Temporal)   Resp 17   Ht 5' 0 2\" (1 529 m)   Wt 94 8 kg (209 lb)   SpO2 95%   BMI 40 55 kg/m²     Physical Exam  Exam conducted with a chaperone present  Constitutional:       General: She is awake  Appearance: Normal appearance  HENT:      Head: Normocephalic  Jaw: There is normal jaw occlusion  Eyes:      General: Lids are normal    Cardiovascular:      Rate and Rhythm: Normal rate and regular rhythm  Heart sounds: Normal heart sounds, S1 normal and S2 normal    Pulmonary:      Effort: Pulmonary effort is normal  No tachypnea or bradypnea  Breath sounds: Normal breath sounds and air entry  Abdominal:      General: Bowel sounds are normal       Palpations: Abdomen is soft  Tenderness: There is no abdominal tenderness  There is no right CVA tenderness or left CVA tenderness  Genitourinary:     General: Normal vulva  Pubic Area: No rash  Labia:         Right: No rash or lesion  Left: No rash or lesion  Urethra: No urethral lesion  Vagina: No signs of injury  No vaginal discharge, erythema or bleeding  Cervix: Normal    Skin:     General: Skin is warm     Neurological:      Mental Status: She is alert and " oriented to person, place, and time  Psychiatric:         Attention and Perception: Attention normal          Behavior: Behavior is cooperative         Tacho Lewis MD

## 2023-03-29 NOTE — ASSESSMENT & PLAN NOTE
Patient reporting several weeks of dysuria, smelly urine and urinary frequency  Patient denies any fevers chills, nausea, vomiting or diarrhea  + Abdominal pain  Patient reports she is sexually active with multiple believes she has any STDs  Patient denies any vaginal bleeding, vaginal itching but does note some vaginal discharge  On vaginal exam mild tenderness discharge was noted  No vaginal bumps, or erythema noted      Plan  -UA sent out  - Affirm sent out   -Patient declined any gonorrhea/chlamydia, HIV or syphilis testing  -We will follow-up with patient in 1 week joint pain and will discuss results (if available ) and symptoms at that time

## 2023-03-31 LAB
CANDIDA RRNA VAG QL PROBE: NEGATIVE
G VAGINALIS RRNA GENITAL QL PROBE: NEGATIVE
T VAGINALIS RRNA GENITAL QL PROBE: NEGATIVE

## 2023-04-04 ENCOUNTER — OFFICE VISIT (OUTPATIENT)
Dept: SLEEP CENTER | Facility: CLINIC | Age: 68
End: 2023-04-04

## 2023-04-04 VITALS
DIASTOLIC BLOOD PRESSURE: 56 MMHG | SYSTOLIC BLOOD PRESSURE: 108 MMHG | BODY MASS INDEX: 38.89 KG/M2 | HEIGHT: 61 IN | WEIGHT: 206 LBS

## 2023-04-04 DIAGNOSIS — Z78.9 DIFFICULTY USING CONTINUOUS POSITIVE AIRWAY PRESSURE (CPAP) DEVICE: ICD-10-CM

## 2023-04-04 DIAGNOSIS — G47.19 EXCESSIVE DAYTIME SLEEPINESS: ICD-10-CM

## 2023-04-04 DIAGNOSIS — G47.33 OBSTRUCTIVE SLEEP APNEA-HYPOPNEA SYNDROME: Primary | ICD-10-CM

## 2023-04-04 DIAGNOSIS — R94.6 ABNORMAL THYROID FUNCTION TEST: ICD-10-CM

## 2023-04-04 DIAGNOSIS — G47.61 PLMD (PERIODIC LIMB MOVEMENT DISORDER): ICD-10-CM

## 2023-04-04 NOTE — PATIENT INSTRUCTIONS
1   Continue use of CPAP equipment during sleep and try to increase use  2  Call if the pressure change is not comfortable  3  Schedule a mask fitting if you don't like the mask you are using  4  Continue to clean your equipment, as discussed  5  Contact the Sleep [address] [place] [address] with any questions or concerns prior to your next visit, as needed  6  Schedule visit for follow-up in 4 months  7  Complete lab testing - fasting        Nursing Support:  When: Monday through Friday 7A-5PM except holidays  Where: Our direct line is [phone]  If you are having a true emergency please call 911  In the event that the line is busy or it is after hours please leave a voice message and we will return your call  Please speak clearly, leaving your full name, birth date, best number to reach you and the reason for your call  Medication refills: We will need the name of the medication, the dosage, the ordering provider, whether you get a 30 or 90 day refill, and the pharmacy name and address  Medications will be ordered by the provider only  Nurses cannot call in prescriptions  Please allow 7 days for medication refills  Physician requested updates: If your provider requested that you call with an update after starting medication, please be ready to provide us the medication and dosage, what time you take your medication, the time you attempt to fall asleep, time you fall asleep, when you wake up, and what time you get out of bed  Sleep Study Results: We will contact you with sleep study results and/or next steps after the physician has reviewed your testing

## 2023-04-04 NOTE — PROGRESS NOTES
Progress Note - [facility] 75 y o  female   :1955, MRN: [id number]  2023          Follow Up Evaluation / Problem:  Mild Obstructive Sleep Apnea  Periodic limb movement disorder      Thank you for the opportunity of participating in the evaluation and care of this patient in the Sleep Clinic at [facility]  HPI: Marlen Matt is a 75y o  year old female  The patient presents with her son, who translates at her request, for follow up of mild obstructive sleep apnea  She had concern of loud snoring with night time awakenings and excessive daytime sleepiness  She completed a diagnostic sleep study on 22  Testing confirmed mild CHEMO with AHI of 5 1, worsening to 28 in REM sleep  Oxygen dev was 81%  There were also 231 limb movements noted during the study  She began use of APAP and presents to review compliance and effectiveness of treatment  Current Outpatient Medications:   •  carbamide peroxide (DEBROX) 6 5 % otic solution, Administer 5 drops into both ears in the morning and 5 drops in the evening  (Patient not taking: Reported on 2022), Disp: 15 mL, Rfl: 0  •  magnesium citrate (CITROMA) 1 745 g/30 mL oral solution, Take 296 mL by mouth once for 1 dose (Patient not taking: Reported on 3/29/2023), Disp: 296 mL, Rfl: 0  •  omeprazole (PriLOSEC) 40 MG capsule, Take 1 capsule (40 mg total) by mouth daily (Patient not taking: Reported on 2022), Disp: 30 capsule, Rfl: 2  •  polyethylene glycol (MiraLax) 17 GM/SCOOP powder, Take 238 g by mouth once for 1 dose Take 238 g my mouth  Use as directed (Patient not taking: Reported on 3/29/2023), Disp: 238 g, Rfl: 0    Wolsey Sleepiness Scale  Sitting and reading: Slight chance of dozing  Watching TV: High chance of dozing  Sitting, inactive in a public place (e g  a theatre or a meeting):  Would never doze  As a passenger in a car for an "hour without a break: Slight chance of dozing  Lying down to rest in the afternoon when circumstances permit: Slight chance of dozing  Sitting and talking to someone: Would never doze  Sitting quietly after a lunch without alcohol: Would never doze  In a car, while stopped for a few minutes in traffic: Slight chance of dozing  Total score: 7              Vitals:    04/04/23 1349   BP: 108/56   Weight: 93 4 kg (206 lb)   Height: 5' 1\" (1 549 m)       Body mass index is 38 92 kg/m²  EPWORTH SLEEPINESS SCORE  Total score: 7   She does not drive      Past History Since Last Sleep Center Visit:   She denies any significant changes to her health since her last visit  Her son reports that she is considering having bariatric surgery  She has been using the CPAP equipment  She reports that the nasal pillows bother her nose  She has burning inside of her nose when using the equipment  She feels it is very dry  The air feels too strong at times as well  This causes her to remove the mask after using it for short periods of time  She has been trying to use it longer lately  When she is able to tolerate it, she feels her sleep quality is improved  She is not as sleepy during the day when using it  [place] is noted to have improved from 19 to 7        The review of systems and following portions of the patient's history were reviewed and updated as appropriate: allergies, current medications, past family history, past medical history, past social history, past surgical history, and problem list         OBJECTIVE  Equipment set up date:  1/25/23  PAP Pressure: Nasal AutoPAP using a lower limit of 5 cm and an upper limit of 15 cm of water pressure - pressure change 5-9cm  Type of mask used: full face  DME Provider: [employer]    Physical Exam:     General Appearance:   Alert, cooperative, no distress, appears stated age, obese     Lungs:    Heart:  Clear to auscultation bilaterally, respirations unlabored      " Regular rate and rhythm, S1 and S2 normal, no murmur, rub or gallop              ASSESSMENT / PLAN    1  Obstructive sleep apnea-hypopnea syndrome  Resmed DME Pressure Change    PAP DME Resupply/Reorder      2  Difficulty using continuous positive airway pressure (CPAP) device  Resmed DME Pressure Change    PAP DME Resupply/Reorder      3  PLMD (periodic limb movement disorder)  TSH, 3rd generation    T4, free    T3, free    Iron Panel (Includes Ferritin, Iron Sat%, Iron, and TIBC)    Magnesium    CBC and differential    T3, free    Magnesium    CBC and differential    T3    T3      4  Abnormal thyroid function test  TSH, 3rd generation    T4, free    T3, free    T3, free    T3    T3      5  Excessive daytime sleepiness                Counseling / Coordination of Care  I have spent a total time of 25 minutes on 04/04/23 in caring for this patient including Diagnostic results, Prognosis, Risks and benefits of tx options, Instructions for management, Patient and family education, Importance of tx compliance, Risk factor reductions, Impressions, Counseling / Coordination of care, Documenting in the medical record and Reviewing / ordering tests, medicine, procedures    Today I reviewed the patient's compliance data  she has been able to use the equipment 70% of all days recorded  Average usage was 4 or more hours 17% of all days recorded  The patient uses the equipment for an average of 3 hours and 3 minutes per night  She attributes this to discomfort with the mask and pressure  She was advised to call if she continues to experience any difficulty using the equipment  The estimated AHI is 1 1  abnormal breathing events per hour  A pressure change to 5-9cm has been ordered today  We discussed adjustments she can make to the heat/humidity settings  The patient feels they benefit from the use of PAP equipment and would like to continue PAP therapy  Response to treatment has been good when tolerated    She would like to try a full face mask instead and will make an appointment to see Diana Carmita  A prescription for supplies has been provided to last for the next year  We also reviewed the results of the sleep study in detail  There were a large number of PLMs noted  She is not aware of limb movements during sleep  Lab testing has been ordered to further evaluate  In review of lab results, it was noted that TSH was slightly low  Repeat TSH with free T4, T3 and free T3 were ordered  She does not wish to take any medication for PLMD at this time  If lab testing is normal, we will revisit  She will continue using this equipment at the settings noted above for the next 4 months  At that timeshe will then return for a routine follow-up evaluation  I have asked the patient to contact the [address] if she encounters any difficulties prior to that time  The following instructions have been given to the patient today:    Patient Instructions   1  Continue use of CPAP equipment during sleep and try to increase use  2  Call if the pressure change is not comfortable  3  Schedule a mask fitting if you don't like the mask you are using  4  Continue to clean your equipment, as discussed  5  Contact the [address] with any questions or concerns prior to your next visit, as needed  6  Schedule visit for follow-up in 4 months  7  Complete lab testing - fasting        Nursing Support:  When: Monday through Friday 7A-5PM except holidays  Where: Our direct line is [phone]  If you are having a true emergency please call 911  In the event that the line is busy or it is after hours please leave a voice message and we will return your call  Please speak clearly, leaving your full name, birth date, best number to reach you and the reason for your call  Medication refills:  We will need the name of the medication, the dosage, the ordering provider, whether you get a 30 or 90 day refill, "and the pharmacy name and address  Medications will be ordered by the provider only  Nurses cannot call in prescriptions  Please allow 7 days for medication refills  Physician requested updates: If your provider requested that you call with an update after starting medication, please be ready to provide us the medication and dosage, what time you take your medication, the time you attempt to fall asleep, time you fall asleep, when you wake up, and what time you get out of bed  Sleep Study Results: We will contact you with sleep study results and/or next steps after the physician has reviewed your testing  Lissa Arriaga, [address] [place] [address]      Portions of the record may have been created with voice recognition software  Occasional wrong word or \"sound a like\" substitutions may have occurred due to the inherent limitations of voice recognition software  Read the chart carefully and recognize, using context, where substitutions have occurred         "

## 2023-04-05 ENCOUNTER — TELEPHONE (OUTPATIENT)
Dept: OBGYN CLINIC | Facility: OTHER | Age: 68
End: 2023-04-05

## 2023-04-05 ENCOUNTER — OFFICE VISIT (OUTPATIENT)
Dept: FAMILY MEDICINE CLINIC | Facility: CLINIC | Age: 68
End: 2023-04-05

## 2023-04-05 ENCOUNTER — TELEPHONE (OUTPATIENT)
Dept: SLEEP CENTER | Facility: CLINIC | Age: 68
End: 2023-04-05

## 2023-04-05 VITALS
DIASTOLIC BLOOD PRESSURE: 68 MMHG | TEMPERATURE: 97.5 F | RESPIRATION RATE: 20 BRPM | WEIGHT: 206 LBS | HEIGHT: 61 IN | BODY MASS INDEX: 38.89 KG/M2 | HEART RATE: 65 BPM | OXYGEN SATURATION: 97 % | SYSTOLIC BLOOD PRESSURE: 109 MMHG

## 2023-04-05 DIAGNOSIS — M65.322 TRIGGER INDEX FINGER OF LEFT HAND: Primary | ICD-10-CM

## 2023-04-05 DIAGNOSIS — M25.50 ARTHRALGIA, UNSPECIFIED JOINT: ICD-10-CM

## 2023-04-05 NOTE — TELEPHONE ENCOUNTER
Patient is being referred to a orthopedics  Please schedule accordingly      [address]   [phone]

## 2023-04-05 NOTE — ASSESSMENT & PLAN NOTE
Patient with 4 to 5 weeks of difficulty opening right index finger  Patient states that it has been getting progressively worse  Patient reports that it is painful at time  Patient has tried conservative management with no relief  It is likely patient will need finger inject so I have referred her to sport medicine  Patient is aware that ortho maybe need to be consulted in the future if thing worsen

## 2023-04-05 NOTE — ASSESSMENT & PLAN NOTE
Patient is reporting hand, hip and back pain that has worsen over the years  Per patient the pain is worst in the morning but is present throughout day  She denies any joint swelling, fevers, chills or redness around joints  Based on history and clinical picture it is unclear if patient has OA vs RA vs other pathology  Will order RA labs for now, and continue to monitor

## 2023-04-06 LAB

## 2023-05-15 ENCOUNTER — TELEPHONE (OUTPATIENT)
Dept: FAMILY MEDICINE CLINIC | Facility: CLINIC | Age: 68
End: 2023-05-15

## 2023-05-15 NOTE — TELEPHONE ENCOUNTER
Patient called is currently in [place] taking care of her ill mother. Please cancel appt schedule for 05/26. Patient will call back to reschedule once back in the country.

## 2023-07-03 ENCOUNTER — OFFICE VISIT (OUTPATIENT)
Dept: OBGYN CLINIC | Facility: CLINIC | Age: 68
End: 2023-07-03
Payer: COMMERCIAL

## 2023-07-03 VITALS — BODY MASS INDEX: 38.89 KG/M2 | HEIGHT: 61 IN | WEIGHT: 206 LBS

## 2023-07-03 DIAGNOSIS — M65.322 TRIGGER INDEX FINGER OF LEFT HAND: ICD-10-CM

## 2023-07-03 DIAGNOSIS — M65.331 TRIGGER FINGER, RIGHT MIDDLE FINGER: ICD-10-CM

## 2023-07-03 DIAGNOSIS — R20.0 BILATERAL HAND NUMBNESS: Primary | ICD-10-CM

## 2023-07-03 PROCEDURE — 1160F RVW MEDS BY RX/DR IN RCRD: CPT | Performed by: SURGERY

## 2023-07-03 PROCEDURE — 99203 OFFICE O/P NEW LOW 30 MIN: CPT | Performed by: SURGERY

## 2023-07-03 PROCEDURE — 1159F MED LIST DOCD IN RCRD: CPT | Performed by: SURGERY

## 2023-07-03 PROCEDURE — 20550 NJX 1 TENDON SHEATH/LIGAMENT: CPT | Performed by: SURGERY

## 2023-07-03 RX ORDER — LIDOCAINE HYDROCHLORIDE 10 MG/ML
2.5 INJECTION, SOLUTION INFILTRATION; PERINEURAL
Status: COMPLETED | OUTPATIENT
Start: 2023-07-03 | End: 2023-07-03

## 2023-07-03 RX ORDER — BETAMETHASONE SODIUM PHOSPHATE AND BETAMETHASONE ACETATE 3; 3 MG/ML; MG/ML
3 INJECTION, SUSPENSION INTRA-ARTICULAR; INTRALESIONAL; INTRAMUSCULAR; SOFT TISSUE
Status: COMPLETED | OUTPATIENT
Start: 2023-07-03 | End: 2023-07-03

## 2023-07-03 RX ADMIN — LIDOCAINE HYDROCHLORIDE 2.5 ML: 10 INJECTION, SOLUTION INFILTRATION; PERINEURAL at 13:00

## 2023-07-03 RX ADMIN — BETAMETHASONE SODIUM PHOSPHATE AND BETAMETHASONE ACETATE 3 MG: 3; 3 INJECTION, SUSPENSION INTRA-ARTICULAR; INTRALESIONAL; INTRAMUSCULAR; SOFT TISSUE at 13:00

## 2023-07-03 NOTE — PROGRESS NOTES
ORTHOPAEDIC HAND, WRIST, AND ELBOW OFFICE  VISIT       ASSESSMENT/PLAN:    70 y o female who presents with left Index, Right Middle finger trigger fingers and B/L hand numbness    Physical exam performed. Discussed injections for her trigger fingers  Pt was offered, accepted, performed and steroid injection left Index and Right Long A1 pulleys for symptomatic relief. Pt tolerated injections well. Ice and post injection protocol advised. Weigh bearing activities as tolerated. NSAIDs as needed for post injection discomfort. Risks, benefits and alternative treatments were discussed with the patient. These included but are not limited to: bleeding, infection, damage to nerves, vessels or tendons, allergic reaction to agents, possible increase in pain, tendon or ligament rupture, weakening of bone or soft tissues, and/or elevation in blood sugar. Patient understands and would like to proceed with the proposed procedure. B/L Cock up splints dispensed for night time use only for her numbness    The patient verbalized understanding of exam findings and treatment plan. We engaged in the shared decision-making process and treatment options were discussed at length with the patient. Surgical and conservative management discussed today along with risks and benefits. Diagnoses and all orders for this visit:    Bilateral hand numbness  -     Durable Medical Equipment    Trigger index finger of left hand  -     Ambulatory Referral to Sports Medicine    Trigger finger, right middle finger    Other orders  -     Hand/upper extremity injection  -     Hand/upper extremity injection        Follow Up:  Return in about 8 weeks (around 8/28/2023) for Recheck.     To Do Next Visit:  Re-evaluation of current issue          ____________________________________________________________________________________________________________________________________________      CHIEF COMPLAINT:  Chief Complaint   Patient presents with   • Left Hand - Pain       SUBJECTIVE:  Erich Mckeon is a 75y.o. year old  female who presents for evaluation of Left Index and Right long finger locking as well as B/L hand numbness      Pt states that she has been having locking of her Left index finger or approx 6 months  She states she has no trauma,injury nor has she sought treatment. She is also having locking in her Right long. She states she had a tendon repair approx 35 yr ago on this finger. She also reports AM numbness in B/L hands. She denies any treatment for the numbness.          I have personally reviewed all the relevant PMH, PSH, SH, FH, Medications and allergies      PAST MEDICAL HISTORY:  Past Medical History:   Diagnosis Date   • GERD (gastroesophageal reflux disease)        PAST SURGICAL HISTORY:  Past Surgical History:   Procedure Laterality Date   •  SECTION     • HAND SURGERY         FAMILY HISTORY:  Family History   Problem Relation Age of Onset   • No Known Problems Mother    • Cancer Father    • Stomach cancer Father         mets; unknown age- at 37   • No Known Problems Daughter    • No Known Problems Maternal Grandmother    • No Known Problems Maternal Grandfather    • No Known Problems Paternal Grandmother    • No Known Problems Paternal Grandfather    • No Known Problems Son    • No Known Problems Son    • No Known Problems Maternal Aunt    • No Known Problems Maternal Aunt    • No Known Problems Maternal Aunt    • No Known Problems Maternal Aunt    • No Known Problems Maternal Aunt    • No Known Problems Maternal Aunt    • No Known Problems Maternal Aunt    • Breast cancer Neg Hx    • Colon cancer Neg Hx    • Endometrial cancer Neg Hx    • Ovarian cancer Neg Hx        SOCIAL HISTORY:  Social History     Tobacco Use   • Smoking status: Never   • Smokeless tobacco: Never   Vaping Use   • Vaping Use: Never used   Substance Use Topics   • Alcohol use: Never   • Drug use: Never       MEDICATIONS:    Current Outpatient Medications:   •  carbamide peroxide (DEBROX) 6.5 % otic solution, Administer 5 drops into both ears in the morning and 5 drops in the evening. (Patient not taking: Reported on 5/25/2022), Disp: 15 mL, Rfl: 0  •  magnesium citrate (CITROMA) 1.745 g/30 mL oral solution, Take 296 mL by mouth once for 1 dose (Patient not taking: Reported on 3/29/2023), Disp: 296 mL, Rfl: 0  •  omeprazole (PriLOSEC) 40 MG capsule, Take 1 capsule (40 mg total) by mouth daily (Patient not taking: Reported on 12/27/2022), Disp: 30 capsule, Rfl: 2  •  polyethylene glycol (MiraLax) 17 GM/SCOOP powder, Take 238 g by mouth once for 1 dose Take 238 g my mouth. Use as directed (Patient not taking: Reported on 3/29/2023), Disp: 238 g, Rfl: 0    ALLERGIES:  Allergies   Allergen Reactions   • Aspirin GI Intolerance           REVIEW OF SYSTEMS:  Review of Systems   Constitutional: Negative for chills and fever. HENT: Negative for ear pain and sore throat. Eyes: Negative for pain and visual disturbance. Respiratory: Negative for cough and shortness of breath. Cardiovascular: Negative for chest pain and palpitations. Gastrointestinal: Negative for abdominal pain and vomiting. Genitourinary: Negative for dysuria and hematuria. Musculoskeletal: Negative for arthralgias and back pain. Skin: Negative for color change and rash. Neurological: Positive for numbness. Negative for seizures and syncope. All other systems reviewed and are negative. VITALS:  There were no vitals filed for this visit.     LABS:  HgA1c:   Lab Results   Component Value Date    HGBA1C 5.9 (H) 05/26/2022     BMP:   Lab Results   Component Value Date    CALCIUM 9.3 05/26/2022    K 4.2 05/26/2022    CO2 30 05/26/2022     05/26/2022    BUN 19 05/26/2022    CREATININE 0.71 05/26/2022       _____________________________________________________  PHYSICAL EXAMINATION:  General: well developed and well nourished, alert, oriented times 3 and appears comfortable  Psychiatric: Normal  HEENT: Normocephalic, Atraumatic Trachea Midline, No torticollis  Pulmonary: No audible wheezing or respiratory distress   Abdomen/GI: Non tender, non distended   Cardiovascular: No pitting edema, 2+ radial pulse   Skin: No masses, erythema, lacerations, fluctation, ulcerations  Neurovascular: Sensation Intact to the Median, Ulnar, Radial Nerve, Motor Intact to the Median, Ulnar, Radial Nerve and Pulses Intact  Musculoskeletal: Normal, except as noted in detailed exam and in HPI. MUSCULOSKELETAL EXAMINATION:  SILT  Composite fist    left long finger:  Positive palpable flexor tendon nodule at the the A1 pulley level. Positive tenderness to palpation over A1 pulley. Positive catching. Negative clicking. right long finger:  Positive palpable flexor tendon nodule at the the A1 pulley level. Positive tenderness to palpation over A1 pulley. Positive catching. Negative clicking. Carpal tunnel compression  Right negative  Left positive    Cubital Tunnel compression  Right and left postivie    ___________________________________________________  STUDIES REVIEWED:  No images reviewed          PROCEDURES PERFORMED:  Hand/upper extremity injection: L index A1  Universal Protocol:  Consent: Verbal consent obtained.   Risks and benefits: risks, benefits and alternatives were discussed  Consent given by: patient  Timeout called at: 7/3/2023 1:39 PM.  Patient understanding: patient states understanding of the procedure being performed  Site marked: the operative site was marked  Patient identity confirmed: verbally with patient    Supporting Documentation  Indications: pain and tendon swelling   Procedure Details  Condition:trigger finger Location: index finger - L index A1   Needle size: 22 G  Ultrasound guidance: no  Approach: volar  Medications administered: 2.5 mL lidocaine 1 %; 3 mg betamethasone acetate-betamethasone sodium phosphate 6 (3-3) mg/mL    Patient tolerance: patient tolerated the procedure well with no immediate complications  Dressing:  Sterile dressing applied     Hand/upper extremity injection: R long A1  Universal Protocol:  Consent: Verbal consent obtained.   Risks and benefits: risks, benefits and alternatives were discussed  Consent given by: patient  Timeout called at: 7/3/2023 1:39 PM.  Patient understanding: patient states understanding of the procedure being performed  Site marked: the operative site was marked  Patient identity confirmed: verbally with patient    Supporting Documentation  Indications: tendon swelling and pain   Procedure Details  Condition:trigger finger Location: long finger - R long A1   Needle size: 22 G  Ultrasound guidance: no  Approach: volar  Medications administered: 2.5 mL lidocaine 1 %; 3 mg betamethasone acetate-betamethasone sodium phosphate 6 (3-3) mg/mL    Patient tolerance: patient tolerated the procedure well with no immediate complications  Dressing:  Sterile dressing applied             _____________________________________________________      Britni Kj    I,:  Alverto Calvert am acting as a scribe while in the presence of the attending physician.:       I,:  Parrish Pierre MD personally performed the services described in this documentation    as scribed in my presence.:

## 2023-07-10 ENCOUNTER — TELEPHONE (OUTPATIENT)
Dept: FAMILY MEDICINE CLINIC | Facility: CLINIC | Age: 68
End: 2023-07-10

## 2023-07-10 ENCOUNTER — OFFICE VISIT (OUTPATIENT)
Dept: FAMILY MEDICINE CLINIC | Facility: CLINIC | Age: 68
End: 2023-07-10

## 2023-07-10 VITALS
SYSTOLIC BLOOD PRESSURE: 115 MMHG | BODY MASS INDEX: 38.67 KG/M2 | HEIGHT: 61 IN | WEIGHT: 204.8 LBS | TEMPERATURE: 98 F | OXYGEN SATURATION: 98 % | RESPIRATION RATE: 18 BRPM | DIASTOLIC BLOOD PRESSURE: 71 MMHG | HEART RATE: 60 BPM

## 2023-07-10 DIAGNOSIS — E78.2 MIXED HYPERLIPIDEMIA: ICD-10-CM

## 2023-07-10 DIAGNOSIS — R73.03 PREDIABETES: ICD-10-CM

## 2023-07-10 DIAGNOSIS — E66.01 CLASS 2 SEVERE OBESITY WITH SERIOUS COMORBIDITY AND BODY MASS INDEX (BMI) OF 39.0 TO 39.9 IN ADULT, UNSPECIFIED OBESITY TYPE (HCC): ICD-10-CM

## 2023-07-10 DIAGNOSIS — Z00.00 ANNUAL PHYSICAL EXAM: Primary | ICD-10-CM

## 2023-07-10 DIAGNOSIS — M54.30 SCIATICA, UNSPECIFIED LATERALITY: ICD-10-CM

## 2023-07-10 DIAGNOSIS — Z13.6 SCREENING FOR CARDIOVASCULAR CONDITION: ICD-10-CM

## 2023-07-10 PROBLEM — E66.812 CLASS 2 SEVERE OBESITY WITH SERIOUS COMORBIDITY AND BODY MASS INDEX (BMI) OF 39.0 TO 39.9 IN ADULT (HCC): Status: ACTIVE | Noted: 2023-07-10

## 2023-07-10 PROCEDURE — 99397 PER PM REEVAL EST PAT 65+ YR: CPT | Performed by: FAMILY MEDICINE

## 2023-07-10 RX ORDER — LIDOCAINE 50 MG/G
1 PATCH TOPICAL DAILY
Qty: 8 PATCH | Refills: 0 | Status: SHIPPED | OUTPATIENT
Start: 2023-07-10

## 2023-07-10 RX ORDER — SENNOSIDES 8.6 MG
650 CAPSULE ORAL EVERY 8 HOURS PRN
Qty: 14 TABLET | Refills: 0 | Status: SHIPPED | OUTPATIENT
Start: 2023-07-10

## 2023-07-10 NOTE — ASSESSMENT & PLAN NOTE
Immunizations and preventive care screenings were discussed with patient today. Appropriate education was printed on patient's after visit summary. Patient is up-to-date on colonoscopy last done in 2022 will need to be done in 3 years. Mammogram done in February was abnormal repeat mammogram ordered for this month. Counseling:  Alcohol/drug use: discussed moderation in alcohol intake, the recommendations for healthy alcohol use, and avoidance of illicit drug use. Dental Health: discussed importance of regular tooth brushing, flossing, and dental visits. Injury prevention: discussed safety/seat belts, safety helmets, smoke detectors, carbon dioxide detectors, and smoking near bedding or upholstery. Sexual health: discussed sexually transmitted diseases, partner selection, use of condoms, avoidance of unintended pregnancy, and contraceptive alternatives. · Exercise: the importance of regular exercise/physical activity was discussed. Recommend exercise 3-5 times per week for at least 30 minutes.

## 2023-07-10 NOTE — ASSESSMENT & PLAN NOTE
Patient with a history of prediabetes. Last A1c 5.6 2022. Patient reports having to eat a healthy diet. Patient to see patient to help with healthy eating. Will order A1c.

## 2023-07-10 NOTE — PROGRESS NOTES
[address] [facility]    NAME: Becky San  AGE: 75 y.o. SEX: female  : 1955     DATE: 7/10/2023     Assessment and Plan:     Problem List Items Addressed This Visit        Nervous and Auditory    Sciatica     Patient with a history of sciatica requesting pain medication. We will give Tylenol for pain management as patient had issues with aspirin because of GI complications. We will also give lidocaine patch for pain. Relevant Medications    acetaminophen (TYLENOL) 650 mg CR tablet    lidocaine (Lidoderm) 5 %       Other    Prediabetes     Patient with a history of prediabetes. Last A1c 5.6 . Patient reports having to eat a healthy diet. Patient to see patient to help with healthy eating. Will order A1c. Relevant Orders    HEMOGLOBIN A1C W/ EAG ESTIMATION    Mixed hyperlipidemia     Patient has ASCVD risk of 6.1. We will hold off on statin and conversation for now until we get lipid panel back. Patient offered statin last year but declined. We will recheck lipids. Patient reports exercising 3-5 times a day          Relevant Orders    Lipid panel    Annual physical exam - Primary     Immunizations and preventive care screenings were discussed with patient today. Appropriate education was printed on patient's after visit summary. Patient is up-to-date on colonoscopy last done in  will need to be done in 3 years. Mammogram done in February was abnormal repeat mammogram ordered for this month. Counseling:  Alcohol/drug use: discussed moderation in alcohol intake, the recommendations for healthy alcohol use, and avoidance of illicit drug use. Dental Health: discussed importance of regular tooth brushing, flossing, and dental visits.   Injury prevention: discussed safety/seat belts, safety helmets, smoke detectors, carbon dioxide detectors, and smoking near bedding or upholstery. Sexual health: discussed sexually transmitted diseases, partner selection, use of condoms, avoidance of unintended pregnancy, and contraceptive alternatives. · Exercise: the importance of regular exercise/physical activity was discussed. Recommend exercise 3-5 times per week for at least 30 minutes. Class 2 severe obesity with serious comorbidity and body mass index (BMI) of 39.0 to 39.9 in adult [facility])     Patient with BMI of 39.21. The patient reports wanting to lose some weight. Patient would like to talk to nutritionist about healthy eating. Patient reports walking 30 to 40 minutes daily. Discussed healthy lifestyle modifications with patient. We will refer to nutritionist for healthy diet choices. Relevant Orders    Ambulatory Referral to Nutrition Services    Screening for cardiovascular condition    Relevant Orders    CBC and differential     ·     BMI Counseling: Body mass index is 39.21 kg/m². The BMI is above normal. Nutrition recommendations include decreasing portion sizes, encouraging healthy choices of fruits and vegetables, decreasing fast food intake and consuming healthier snacks. Exercise recommendations include exercising 3-5 times per week. Patient referred to nutritionist. Rationale for BMI follow-up plan is due to patient being overweight or obese. Depression Screening and Follow-up Plan: Patient was screened for depression during today's encounter. They screened negative with a PHQ-2 score of 0. Return in about 4 weeks (around 8/7/2023) for lab f/u/belly pain. Chief Complaint:     Chief Complaint   Patient presents with   • Physical Exam      History of Present Illness:     Adult Annual Physical   Patient here for a comprehensive physical exam. The patient reports wanting to get healthier     Diet and Physical Activity  · Diet/Nutrition: well balanced diet, limited junk food and consuming 3-5 servings of fruits/vegetables daily.    · Exercise: walking and less than 30 minutes on average. Depression Screening  PHQ-2/9 Depression Screening    Little interest or pleasure in doing things: 0 - not at all  Feeling down, depressed, or hopeless: 0 - not at all  PHQ-2 Score: 0  PHQ-2 Interpretation: Negative depression screen       General Health  · Sleep: sleeps well, snores loudly and experiences daytime hypersomnolence. · Hearing: normal - bilateral.  · Vision: no vision problems. · Dental: regular dental visits. /GYN Health  · Patient is: postmenopausal  · Last menstrual period: at 47year old   · Contraceptive method: none     Review of Systems:     Review of Systems   Constitutional: Negative for chills and fever. HENT: Negative for ear pain and sore throat. Eyes: Negative for pain and visual disturbance. Respiratory: Negative for cough and shortness of breath. Cardiovascular: Negative for chest pain and palpitations. Gastrointestinal: Negative for abdominal pain and vomiting. Genitourinary: Positive for dysuria. Negative for hematuria. Musculoskeletal: Negative for arthralgias and back pain. Skin: Negative for color change and rash. Neurological: Negative for seizures and syncope. All other systems reviewed and are negative.      Past Medical History:     Past Medical History:   Diagnosis Date   • GERD (gastroesophageal reflux disease)       Past Surgical History:     Past Surgical History:   Procedure Laterality Date   •  SECTION     • HAND SURGERY        Social History:     Social History     Socioeconomic History   • Marital status:      Spouse name: None   • Number of children: 3   • Years of education: None   • Highest education level: None   Occupational History   • None   Tobacco Use   • Smoking status: Never   • Smokeless tobacco: Never   Vaping Use   • Vaping Use: Never used   Substance and Sexual Activity   • Alcohol use: Never   • Drug use: Never   • Sexual activity: Not Currently   Other Topics Concern   • None   Social History Narrative   • None     Social Determinants of Health     Financial Resource Strain: Low Risk  (2022)    Overall Financial Resource Strain (CARDIA)    • Difficulty of Paying Living Expenses: Not very hard   Food Insecurity: Unknown (2022)    Hunger Vital Sign    • Worried About Running Out of Food in the Last Year: Patient refused    • [address] in the Last Year: Patient refused   Transportation Needs: No Transportation Needs (2022)    PRAPARE - Transportation    • Lack of Transportation (Medical): No    • Lack of Transportation (Non-Medical): No   Physical Activity: Not on file   Stress: No Stress Concern Present (2022)    [facility]    • Feeling of Stress :  Only a little   Social Connections: Unknown (2022)    Social Connection and Isolation Panel [NHANES]    • Frequency of Communication with Friends and Family: More than three times a week    • Frequency of Social Gatherings with Friends and Family: More than three times a week    • Attends [employer] Services: Not on file    • Active Member of Clubs or Organizations: Not on file    • Attends Club or Organization Meetings: Not on file    • Marital Status: Not on file   Intimate Partner Violence: Not At Risk (2022)    Humiliation, Afraid, Rape, and Kick questionnaire    • Fear of Current or Ex-Partner: No    • Emotionally Abused: No    • Physically Abused: No    • Sexually Abused: No   Housing Stability: Low Risk  (2022)    Housing Stability Vital Sign    • Unable to Pay for Housing in the Last Year: No    • Number of Places Lived in the Last Year: 2    • Unstable Housing in the Last Year: No      Family History:     Family History   Problem Relation Age of Onset   • No Known Problems Mother    • Cancer Father    • Stomach cancer Father         mets; unknown age- at 37   • No Known Problems Daughter    • No Known Problems Maternal Grandmother    • No Known Problems Maternal Grandfather    • No Known Problems Paternal Grandmother    • No Known Problems Paternal Grandfather    • No Known Problems Son    • No Known Problems Son    • No Known Problems Maternal Aunt    • No Known Problems Maternal Aunt    • No Known Problems Maternal Aunt    • No Known Problems Maternal Aunt    • No Known Problems Maternal Aunt    • No Known Problems Maternal Aunt    • No Known Problems Maternal Aunt    • Breast cancer Neg Hx    • Colon cancer Neg Hx    • Endometrial cancer Neg Hx    • Ovarian cancer Neg Hx       Current Medications:     Current Outpatient Medications   Medication Sig Dispense Refill   • acetaminophen (TYLENOL) 650 mg CR tablet Take 1 tablet (650 mg total) by mouth every 8 (eight) hours as needed for mild pain 14 tablet 0   • lidocaine (Lidoderm) 5 % Apply 1 patch topically over 12 hours daily Remove & Discard patch within 12 hours or as directed by MD 8 patch 0   • carbamide peroxide (DEBROX) 6.5 % otic solution Administer 5 drops into both ears in the morning and 5 drops in the evening. (Patient not taking: Reported on 5/25/2022) 15 mL 0   • magnesium citrate (CITROMA) 1.745 g/30 mL oral solution Take 296 mL by mouth once for 1 dose (Patient not taking: Reported on 3/29/2023) 296 mL 0   • omeprazole (PriLOSEC) 40 MG capsule Take 1 capsule (40 mg total) by mouth daily (Patient not taking: Reported on 12/27/2022) 30 capsule 2   • polyethylene glycol (MiraLax) 17 GM/SCOOP powder Take 238 g by mouth once for 1 dose Take 238 g my mouth. Use as directed (Patient not taking: Reported on 3/29/2023) 238 g 0     No current facility-administered medications for this visit. Allergies:      Allergies   Allergen Reactions   • Aspirin GI Intolerance      Physical Exam:     /71   Pulse 60   Temp 98 °F (36.7 °C)   Resp 18   Ht 5' 0.6" (1.539 m)   Wt 92.9 kg (204 lb 12.8 oz)   SpO2 98%   BMI 39.21 kg/m²     Physical Exam  Vitals and nursing note reviewed. Constitutional:       General: She is not in acute distress. Appearance: She is well-developed. HENT:      Head: Normocephalic and atraumatic. Eyes:      Conjunctiva/sclera: Conjunctivae normal.   Cardiovascular:      Rate and Rhythm: Normal rate and regular rhythm. Heart sounds: No murmur heard. Pulmonary:      Effort: Pulmonary effort is normal. No respiratory distress. Breath sounds: Normal breath sounds. Abdominal:      Palpations: Abdomen is soft. Tenderness: There is no abdominal tenderness. Musculoskeletal:         General: No swelling. Cervical back: Neck supple. Skin:     General: Skin is warm and dry. Capillary Refill: Capillary refill takes less than 2 seconds. Neurological:      Mental Status: She is alert.    Psychiatric:         Mood and Affect: Mood normal.          MD Chapis Eugene

## 2023-07-10 NOTE — TELEPHONE ENCOUNTER
Patient was seen by Dr. Yvette Dias today, and has two medication questions:    1. Will patient receive a prescription for Biprofenid? She believes it was discussed. 2. Magnesium citrate (noted in file - patient is not taking this medication). Should the medication be taken going forward?

## 2023-07-10 NOTE — ASSESSMENT & PLAN NOTE
Patient has ASCVD risk of 6.1. We will hold off on statin and conversation for now until we get lipid panel back. Patient offered statin last year but declined. We will recheck lipids.   Patient reports exercising 3-5 times a day

## 2023-07-10 NOTE — ASSESSMENT & PLAN NOTE
Patient with BMI of 39.21. The patient reports wanting to lose some weight. Patient would like to talk to nutritionist about healthy eating. Patient reports walking 30 to 40 minutes daily. Discussed healthy lifestyle modifications with patient. We will refer to nutritionist for healthy diet choices.

## 2023-07-10 NOTE — ASSESSMENT & PLAN NOTE
Patient with a history of sciatica requesting pain medication. We will give Tylenol for pain management as patient had issues with aspirin because of GI complications. We will also give lidocaine patch for pain.

## 2023-07-10 NOTE — PATIENT INSTRUCTIONS
Wellness Visit for Adults   AMBULATORY CARE:   A wellness visit  is when you see your healthcare provider to get screened for health problems. Your healthcare provider will also give you advice on how to stay healthy. Write down your questions so you remember to ask them. Ask your healthcare provider how often you should have a wellness visit. What happens at a wellness visit:  Your healthcare provider will ask about your health, and your family history of health problems. This includes high blood pressure, heart disease, and cancer. He or she will ask if you have symptoms that concern you, if you smoke, and about your mood. You may also be asked about your intake of medicines, supplements, food, and alcohol. Any of the following may be done:  • Your weight  will be checked. Your height may also be checked so your body mass index (BMI) can be calculated. Your BMI shows if you are at a healthy weight. • Your blood pressure  and heart rate will be checked. Your temperature may also be checked. • Blood and urine tests  may be done. Blood tests may be done to check your cholesterol levels. Abnormal cholesterol levels increase your risk for heart disease and stroke. You may also need a blood or urine test to check for diabetes if you are at increased risk. Urine tests may be done to look for signs of an infection or kidney disease. • A physical exam  includes checking your heartbeat and lungs with a stethoscope. Your healthcare provider may also check your skin to look for sun damage. • Screening tests  may be recommended. A screening test is done to check for diseases that may not cause symptoms. The screening tests you may need depend on your age, gender, family history, and lifestyle habits. For example, colorectal screening may be recommended if you are 48years old or older. Screening tests you need if you are a woman:   • A Pap smear  is used to screen for cervical cancer.  Pap smears are usually done every 3 to 5 years depending on your age. You may need them more often if you have had abnormal Pap smear test results in the past. Ask your healthcare provider how often you should have a Pap smear. • A mammogram  is an x-ray of your breasts to screen for breast cancer. Experts recommend mammograms every 2 years starting at age 48 years. You may need a mammogram at age 52 years or younger if you have an increased risk for breast cancer. Talk to your healthcare provider about when you should start having mammograms and how often you need them. Vaccines you may need:   • Get an influenza vaccine  every year. The influenza vaccine protects you from the flu. Several types of viruses cause the flu. The viruses change over time, so new vaccines are made each year. • Get a tetanus-diphtheria (Td) booster vaccine  every 10 years. This vaccine protects you against tetanus and diphtheria. Tetanus is a severe infection that may cause painful muscle spasms and lockjaw. Diphtheria is a severe bacterial infection that causes a thick covering in the back of your mouth and throat. • Get a human papillomavirus (HPV) vaccine  if you are female and aged 23 to 32 or male 23 to 24 and never received it. This vaccine protects you from HPV infection. HPV is the most common infection spread by sexual contact. HPV may also cause vaginal, penile, and anal cancers. • Get a pneumococcal vaccine  if you are aged 72 years or older. The pneumococcal vaccine is an injection given to protect you from pneumococcal disease. Pneumococcal disease is an infection caused by pneumococcal bacteria. The infection may cause pneumonia, meningitis, or an ear infection. • Get a shingles vaccine  if you are 60 or older, even if you have had shingles before. The shingles vaccine is an injection to protect you from the varicella-zoster virus. This is the same virus that causes chickenpox.  Shingles is a painful rash that develops in people who had chickenpox or have been exposed to the virus. How to eat healthy:  My Plate is a model for planning healthy meals. It shows the types and amounts of foods that should go on your plate. Fruits and vegetables make up about half of your plate, and grains and protein make up the other half. A serving of dairy is included on the side of your plate. The amount of calories and serving sizes you need depends on your age, gender, weight, and height. Examples of healthy foods are listed below:  • Eat a variety of vegetables  such as dark green, red, and orange vegetables. You can also include canned vegetables low in sodium (salt) and frozen vegetables without added butter or sauces. • Eat a variety of fresh fruits , canned fruit in 100% juice, frozen fruit, and dried fruit. • Include whole grains. At least half of the grains you eat should be whole grains. Examples include whole-wheat bread, wheat pasta, brown rice, and whole-grain cereals such as oatmeal.    • Eat a variety of protein foods such as seafood (fish and shellfish), lean meat, and poultry without skin (turkey and chicken). Examples of lean meats include pork leg, shoulder, or tenderloin, and beef round, sirloin, tenderloin, and extra lean ground beef. Other protein foods include eggs and egg substitutes, beans, peas, soy products, nuts, and seeds. • Choose low-fat dairy products such as skim or 1% milk or low-fat yogurt, cheese, and cottage cheese. • Limit unhealthy fats  such as butter, hard margarine, and shortening. Exercise:  Exercise at least 30 minutes per day on most days of the week. Some examples of exercise include walking, biking, dancing, and swimming. You can also fit in more physical activity by taking the stairs instead of the elevator or parking farther away from stores. Include muscle strengthening activities 2 days each week. Regular exercise provides many health benefits.  It helps you manage your weight, and decreases your risk for type 2 diabetes, heart disease, stroke, and high blood pressure. Exercise can also help improve your mood. Ask your healthcare provider about the best exercise plan for you. General health and safety guidelines:   • Do not smoke. Nicotine and other chemicals in cigarettes and cigars can cause lung damage. Ask your healthcare provider for information if you currently smoke and need help to quit. E-cigarettes or smokeless tobacco still contain nicotine. Talk to your healthcare provider before you use these products. • Limit alcohol. A drink of alcohol is 12 ounces of beer, 5 ounces of wine, or 1½ ounces of liquor. • Lose weight, if needed. Being overweight increases your risk of certain health conditions. These include heart disease, high blood pressure, type 2 diabetes, and certain types of cancer. • Protect your skin. Do not sunbathe or use tanning beds. Use sunscreen with a SPF 15 or higher. Apply sunscreen at least 15 minutes before you go outside. Reapply sunscreen every 2 hours. Wear protective clothing, hats, and sunglasses when you are outside. • Drive safely. Always wear your seatbelt. Make sure everyone in your car wears a seatbelt. A seatbelt can save your life if you are in an accident. Do not use your cell phone when you are driving. This could distract you and cause an accident. Pull over if you need to make a call or send a text message. • Practice safe sex. Use latex condoms if are sexually active and have more than one partner. Your healthcare provider may recommend screening tests for sexually transmitted infections (STIs). • Wear helmets, lifejackets, and protective gear. Always wear a helmet when you ride a bike or motorcycle, go skiing, or play sports that could cause a head injury. Wear protective equipment when you play sports. Wear a lifejacket when you are on a boat or doing water sports.     © Copyright [employer] 2022 Information is for End User's use only and may not be sold, redistributed or otherwise used for commercial purposes. The above information is an  only. It is not intended as medical advice for individual conditions or treatments. Talk to your doctor, nurse or pharmacist before following any medical regimen to see if it is safe and effective for you. Wellness Visit for Adults   AMBULATORY CARE:   A wellness visit  is when you see your healthcare provider to get screened for health problems. Your healthcare provider will also give you advice on how to stay healthy. Write down your questions so you remember to ask them. Ask your healthcare provider how often you should have a wellness visit. What happens at a wellness visit:  Your healthcare provider will ask about your health, and your family history of health problems. This includes high blood pressure, heart disease, and cancer. He or she will ask if you have symptoms that concern you, if you smoke, and about your mood. You may also be asked about your intake of medicines, supplements, food, and alcohol. Any of the following may be done:  • Your weight  will be checked. Your height may also be checked so your body mass index (BMI) can be calculated. Your BMI shows if you are at a healthy weight. • Your blood pressure  and heart rate will be checked. Your temperature may also be checked. • Blood and urine tests  may be done. Blood tests may be done to check your cholesterol levels. Abnormal cholesterol levels increase your risk for heart disease and stroke. You may also need a blood or urine test to check for diabetes if you are at increased risk. Urine tests may be done to look for signs of an infection or kidney disease. • A physical exam  includes checking your heartbeat and lungs with a stethoscope. Your healthcare provider may also check your skin to look for sun damage. • Screening tests  may be recommended.  A screening test is done to check for diseases that may not cause symptoms. The screening tests you may need depend on your age, gender, family history, and lifestyle habits. For example, colorectal screening may be recommended if you are 48years old or older. Screening tests you need if you are a woman:   • A Pap smear  is used to screen for cervical cancer. Pap smears are usually done every 3 to 5 years depending on your age. You may need them more often if you have had abnormal Pap smear test results in the past. Ask your healthcare provider how often you should have a Pap smear. • A mammogram  is an x-ray of your breasts to screen for breast cancer. Experts recommend mammograms every 2 years starting at age 48 years. You may need a mammogram at age 52 years or younger if you have an increased risk for breast cancer. Talk to your healthcare provider about when you should start having mammograms and how often you need them. Vaccines you may need:   • Get an influenza vaccine  every year. The influenza vaccine protects you from the flu. Several types of viruses cause the flu. The viruses change over time, so new vaccines are made each year. • Get a tetanus-diphtheria (Td) booster vaccine  every 10 years. This vaccine protects you against tetanus and diphtheria. Tetanus is a severe infection that may cause painful muscle spasms and lockjaw. Diphtheria is a severe bacterial infection that causes a thick covering in the back of your mouth and throat. • Get a human papillomavirus (HPV) vaccine  if you are female and aged 23 to 32 or male 23 to 24 and never received it. This vaccine protects you from HPV infection. HPV is the most common infection spread by sexual contact. HPV may also cause vaginal, penile, and anal cancers. • Get a pneumococcal vaccine  if you are aged 72 years or older. The pneumococcal vaccine is an injection given to protect you from pneumococcal disease.  Pneumococcal disease is an infection caused by pneumococcal bacteria. The infection may cause pneumonia, meningitis, or an ear infection. • Get a shingles vaccine  if you are 60 or older, even if you have had shingles before. The shingles vaccine is an injection to protect you from the varicella-zoster virus. This is the same virus that causes chickenpox. Shingles is a painful rash that develops in people who had chickenpox or have been exposed to the virus. How to eat healthy:  My Plate is a model for planning healthy meals. It shows the types and amounts of foods that should go on your plate. Fruits and vegetables make up about half of your plate, and grains and protein make up the other half. A serving of dairy is included on the side of your plate. The amount of calories and serving sizes you need depends on your age, gender, weight, and height. Examples of healthy foods are listed below:  • Eat a variety of vegetables  such as dark green, red, and orange vegetables. You can also include canned vegetables low in sodium (salt) and frozen vegetables without added butter or sauces. • Eat a variety of fresh fruits , canned fruit in 100% juice, frozen fruit, and dried fruit. • Include whole grains. At least half of the grains you eat should be whole grains. Examples include whole-wheat bread, wheat pasta, brown rice, and whole-grain cereals such as oatmeal.    • Eat a variety of protein foods such as seafood (fish and shellfish), lean meat, and poultry without skin (turkey and chicken). Examples of lean meats include pork leg, shoulder, or tenderloin, and beef round, sirloin, tenderloin, and extra lean ground beef. Other protein foods include eggs and egg substitutes, beans, peas, soy products, nuts, and seeds. • Choose low-fat dairy products such as skim or 1% milk or low-fat yogurt, cheese, and cottage cheese. • Limit unhealthy fats  such as butter, hard margarine, and shortening.        Exercise:  Exercise at least 30 minutes per day on most days of the week. Some examples of exercise include walking, biking, dancing, and swimming. You can also fit in more physical activity by taking the stairs instead of the elevator or parking farther away from stores. Include muscle strengthening activities 2 days each week. Regular exercise provides many health benefits. It helps you manage your weight, and decreases your risk for type 2 diabetes, heart disease, stroke, and high blood pressure. Exercise can also help improve your mood. Ask your healthcare provider about the best exercise plan for you. General health and safety guidelines:   • Do not smoke. Nicotine and other chemicals in cigarettes and cigars can cause lung damage. Ask your healthcare provider for information if you currently smoke and need help to quit. E-cigarettes or smokeless tobacco still contain nicotine. Talk to your healthcare provider before you use these products. • Limit alcohol. A drink of alcohol is 12 ounces of beer, 5 ounces of wine, or 1½ ounces of liquor. • Lose weight, if needed. Being overweight increases your risk of certain health conditions. These include heart disease, high blood pressure, type 2 diabetes, and certain types of cancer. • Protect your skin. Do not sunbathe or use tanning beds. Use sunscreen with a SPF 15 or higher. Apply sunscreen at least 15 minutes before you go outside. Reapply sunscreen every 2 hours. Wear protective clothing, hats, and sunglasses when you are outside. • Drive safely. Always wear your seatbelt. Make sure everyone in your car wears a seatbelt. A seatbelt can save your life if you are in an accident. Do not use your cell phone when you are driving. This could distract you and cause an accident. Pull over if you need to make a call or send a text message. • Practice safe sex. Use latex condoms if are sexually active and have more than one partner.  Your healthcare provider may recommend screening tests for sexually transmitted infections (STIs). • Wear helmets, lifejackets, and protective gear. Always wear a helmet when you ride a bike or motorcycle, go skiing, or play sports that could cause a head injury. Wear protective equipment when you play sports. Wear a lifejacket when you are on a boat or doing water sports. © Copyright [employer] 2022 Information is for End User's use only and may not be sold, redistributed or otherwise used for commercial purposes. The above information is an  only. It is not intended as medical advice for individual conditions or treatments. Talk to your doctor, nurse or pharmacist before following any medical regimen to see if it is safe and effective for you.

## 2023-07-27 ENCOUNTER — HOSPITAL ENCOUNTER (OUTPATIENT)
Dept: MAMMOGRAPHY | Facility: CLINIC | Age: 68
Discharge: HOME | End: 2023-07-27
Payer: COMMERCIAL

## 2023-07-27 VITALS — WEIGHT: 204.81 LBS | HEIGHT: 60 IN | BODY MASS INDEX: 40.21 KG/M2

## 2023-07-27 DIAGNOSIS — R92.8 ABNORMAL FINDINGS ON DIAGNOSTIC IMAGING OF BREAST: ICD-10-CM

## 2023-07-27 PROCEDURE — G0279 TOMOSYNTHESIS, MAMMO: HCPCS

## 2023-07-27 PROCEDURE — 77066 DX MAMMO INCL CAD BI: CPT

## 2023-08-02 ENCOUNTER — APPOINTMENT (OUTPATIENT)
Dept: LAB | Facility: CLINIC | Age: 68
End: 2023-08-02
Payer: COMMERCIAL

## 2023-08-02 DIAGNOSIS — Z13.6 SCREENING FOR CARDIOVASCULAR CONDITION: ICD-10-CM

## 2023-08-02 DIAGNOSIS — M25.50 ARTHRALGIA, UNSPECIFIED JOINT: Primary | ICD-10-CM

## 2023-08-02 DIAGNOSIS — R73.03 PREDIABETES: ICD-10-CM

## 2023-08-02 DIAGNOSIS — E78.2 MIXED HYPERLIPIDEMIA: ICD-10-CM

## 2023-08-02 LAB
BASOPHILS # BLD AUTO: 0.02 THOUSANDS/ÂΜL (ref 0–0.1)
BASOPHILS NFR BLD AUTO: 0 % (ref 0–1)
CHOLEST SERPL-MCNC: 295 MG/DL
CRP SERPL QL: <3 MG/L
EOSINOPHIL # BLD AUTO: 0.15 THOUSAND/ÂΜL (ref 0–0.61)
EOSINOPHIL NFR BLD AUTO: 2 % (ref 0–6)
ERYTHROCYTE [DISTWIDTH] IN BLOOD BY AUTOMATED COUNT: 13.6 % (ref 11.6–15.1)
ERYTHROCYTE [SEDIMENTATION RATE] IN BLOOD: 28 MM/HOUR (ref 0–29)
EST. AVERAGE GLUCOSE BLD GHB EST-MCNC: 128 MG/DL
HBA1C MFR BLD: 6.1 %
HCT VFR BLD AUTO: 40.6 % (ref 34.8–46.1)
HDLC SERPL-MCNC: 82 MG/DL
HGB BLD-MCNC: 13.3 G/DL (ref 11.5–15.4)
IMM GRANULOCYTES # BLD AUTO: 0.02 THOUSAND/UL (ref 0–0.2)
IMM GRANULOCYTES NFR BLD AUTO: 0 % (ref 0–2)
LDLC SERPL CALC-MCNC: 178 MG/DL (ref 0–100)
LYMPHOCYTES # BLD AUTO: 3.4 THOUSANDS/ÂΜL (ref 0.6–4.47)
LYMPHOCYTES NFR BLD AUTO: 44 % (ref 14–44)
MCH RBC QN AUTO: 30.1 PG (ref 26.8–34.3)
MCHC RBC AUTO-ENTMCNC: 32.8 G/DL (ref 31.4–37.4)
MCV RBC AUTO: 92 FL (ref 82–98)
MONOCYTES # BLD AUTO: 0.44 THOUSAND/ÂΜL (ref 0.17–1.22)
MONOCYTES NFR BLD AUTO: 6 % (ref 4–12)
NEUTROPHILS # BLD AUTO: 3.65 THOUSANDS/ÂΜL (ref 1.85–7.62)
NEUTS SEG NFR BLD AUTO: 48 % (ref 43–75)
NONHDLC SERPL-MCNC: 213 MG/DL
NRBC BLD AUTO-RTO: 0 /100 WBCS
PLATELET # BLD AUTO: 245 THOUSANDS/UL (ref 149–390)
PMV BLD AUTO: 10.8 FL (ref 8.9–12.7)
RBC # BLD AUTO: 4.42 MILLION/UL (ref 3.81–5.12)
TRIGL SERPL-MCNC: 176 MG/DL
WBC # BLD AUTO: 7.68 THOUSAND/UL (ref 4.31–10.16)

## 2023-08-02 PROCEDURE — 85652 RBC SED RATE AUTOMATED: CPT

## 2023-08-02 PROCEDURE — 80061 LIPID PANEL: CPT

## 2023-08-02 PROCEDURE — 83036 HEMOGLOBIN GLYCOSYLATED A1C: CPT

## 2023-08-02 PROCEDURE — 85025 COMPLETE CBC W/AUTO DIFF WBC: CPT

## 2023-08-02 PROCEDURE — 36415 COLL VENOUS BLD VENIPUNCTURE: CPT

## 2023-08-02 PROCEDURE — 86200 CCP ANTIBODY: CPT

## 2023-08-02 PROCEDURE — 86140 C-REACTIVE PROTEIN: CPT

## 2023-08-03 LAB — CCP AB SER IA-ACNC: 0.5

## 2023-08-07 ENCOUNTER — OFFICE VISIT (OUTPATIENT)
Dept: FAMILY MEDICINE CLINIC | Facility: CLINIC | Age: 68
End: 2023-08-07

## 2023-08-07 VITALS
TEMPERATURE: 98.2 F | HEART RATE: 62 BPM | DIASTOLIC BLOOD PRESSURE: 62 MMHG | SYSTOLIC BLOOD PRESSURE: 109 MMHG | WEIGHT: 207.8 LBS | HEIGHT: 60 IN | OXYGEN SATURATION: 98 % | BODY MASS INDEX: 40.8 KG/M2

## 2023-08-07 DIAGNOSIS — R53.83 OTHER FATIGUE: ICD-10-CM

## 2023-08-07 DIAGNOSIS — E78.2 MIXED HYPERLIPIDEMIA: Primary | ICD-10-CM

## 2023-08-07 PROBLEM — R51.9 HEADACHE: Status: ACTIVE | Noted: 2023-08-07

## 2023-08-07 PROCEDURE — 99213 OFFICE O/P EST LOW 20 MIN: CPT | Performed by: FAMILY MEDICINE

## 2023-08-07 NOTE — ASSESSMENT & PLAN NOTE
Lab Results   Component Value Date    CHOLESTEROL 295 (H) 08/02/2023    TRIG 176 (H) 08/02/2023    HDL 82 08/02/2023    LDLCALC 178 (H) 08/02/2023     Patient presents today for review. Patient has ASCVD risk 5.9. Discussed risk and benefits of statin with patient. Patient willing to start statin. No recent CMP done last time lipid enzymes were checked was in  2021, will order CMP before starting statin. Once CMP results are back if liver enzymes are normal; we will start pravastatin 20 mg daily. Also went over heart healthy diet with patient.

## 2023-08-07 NOTE — PROGRESS NOTES
Name: Lula Chaves      : 1955      MRN: [id number]  Encounter Provider: Chichi Londono MD  Encounter Date: 2023   Encounter department: [address]     1. Other fatigue  Assessment & Plan:  Patient reporting fatigue over the last year. Patient recently diagnosed with CHEMO in October and started on CPAP earlier this year. Patient states that she is not really tolerating CPAP and only getting 3 to 4 hours since starting it. Patient states since starting CPAP she wakes up with headaches in the morning because she feels like she is not getting enough sleep. Encourage patient to follow-up with sleep medicine regarding different CPAP devices. Depression screen normal on exam today. Will order thyroid studies and CMP to check for any secondary causes for fatigue. Patient has CBC which was within normal limits. Orders:  -     TSH, 3rd generation; Future  -     T4, free; Future  -     T3; Future  -     Comprehensive metabolic panel; Future  -     TSH, 3rd generation  -     T4, free  -     T3  -     Comprehensive metabolic panel  -     Vitamin B12; Future  -     Vitamin B12    2. Mixed hyperlipidemia  Assessment & Plan:    Lab Results   Component Value Date    CHOLESTEROL 295 (H) 2023    TRIG 176 (H) 2023    HDL 82 2023    LDLCALC 178 (H) 2023     Patient presents today for review. Patient has ASCVD risk 5.9. Discussed risk and benefits of statin with patient. Patient willing to start statin. No recent CMP done last time lipid enzymes were checked was in  , will order CMP before starting statin. Once CMP results are back if liver enzymes are normal; we will start pravastatin 20 mg daily. Also went over heart healthy diet with patient. Subjective      Patient here for follow-up on labs. Overall patient reports feeling tired as she is not getting enough sleep on CPAP.   Patient also reporting trying to lose weight and eat healthy. .    Review of Systems   Constitutional: Negative for chills and fever. HENT: Negative for ear pain and sore throat. Eyes: Negative for pain and visual disturbance. Respiratory: Negative for cough and shortness of breath. Cardiovascular: Negative for chest pain and palpitations. Gastrointestinal: Negative for abdominal pain and vomiting. Genitourinary: Negative for dysuria and hematuria. Musculoskeletal: Negative for arthralgias and back pain. Skin: Negative for color change and rash. Neurological: Positive for headaches. Negative for seizures and syncope. All other systems reviewed and are negative. Current Outpatient Medications on File Prior to Visit   Medication Sig   • acetaminophen (TYLENOL) 650 mg CR tablet Take 1 tablet (650 mg total) by mouth every 8 (eight) hours as needed for mild pain (Patient not taking: Reported on 8/7/2023)   • carbamide peroxide (DEBROX) 6.5 % otic solution Administer 5 drops into both ears in the morning and 5 drops in the evening. (Patient not taking: Reported on 5/25/2022)   • lidocaine (Lidoderm) 5 % Apply 1 patch topically over 12 hours daily Remove & Discard patch within 12 hours or as directed by MD (Patient not taking: Reported on 8/7/2023)   • magnesium citrate (CITROMA) 1.745 g/30 mL oral solution Take 296 mL by mouth once for 1 dose (Patient not taking: Reported on 3/29/2023)   • omeprazole (PriLOSEC) 40 MG capsule Take 1 capsule (40 mg total) by mouth daily (Patient not taking: Reported on 12/27/2022)   • polyethylene glycol (MiraLax) 17 GM/SCOOP powder Take 238 g by mouth once for 1 dose Take 238 g my mouth.  Use as directed (Patient not taking: Reported on 3/29/2023)       Objective     /62 (BP Location: Left arm, Patient Position: Sitting, Cuff Size: Standard)   Pulse 62   Temp 98.2 °F (36.8 °C) (Temporal)   Ht 5' (1.524 m)   Wt 94.3 kg (207 lb 12.8 oz)   SpO2 98%   BMI 40.58 kg/m²     Physical Exam  Constitutional:       General: She is awake. Appearance: Normal appearance. HENT:      Head: Normocephalic. Jaw: There is normal jaw occlusion. Eyes:      General: Lids are normal.   Cardiovascular:      Rate and Rhythm: Normal rate and regular rhythm. Heart sounds: Normal heart sounds, S1 normal and S2 normal.   Pulmonary:      Effort: Pulmonary effort is normal. No tachypnea or bradypnea. Breath sounds: Normal breath sounds and air entry. Abdominal:      General: Bowel sounds are normal.      Palpations: Abdomen is soft. Tenderness: There is no abdominal tenderness. Skin:     General: Skin is warm. Neurological:      Mental Status: She is alert and oriented to person, place, and time. Psychiatric:         Attention and Perception: Attention normal.         Behavior: Behavior is cooperative.        French Lindo MD

## 2023-08-07 NOTE — ASSESSMENT & PLAN NOTE
Patient reporting fatigue over the last year. Patient recently diagnosed with CHEMO in October and started on CPAP earlier this year. Patient states that she is not really tolerating CPAP and only getting 3 to 4 hours since starting it. Patient states since starting CPAP she wakes up with headaches in the morning because she feels like she is not getting enough sleep. Encourage patient to follow-up with sleep medicine regarding different CPAP devices. Depression screen normal on exam today. Will order thyroid studies and CMP to check for any secondary causes for fatigue. Patient has CBC which was within normal limits.

## 2023-08-07 NOTE — ASSESSMENT & PLAN NOTE
Patient with a history of prediabetes. Current A1c 6.1. Discussed healthy lifestyle and exercise with patient. Patient previously referred to nutritionist.  Encourage patient to contact nutritionist to set up appointment. We will repeat A1c in 3 months; after lifestyle modifications.

## 2023-08-22 ENCOUNTER — APPOINTMENT (OUTPATIENT)
Dept: LAB | Age: 68
End: 2023-08-22
Payer: COMMERCIAL

## 2023-08-22 DIAGNOSIS — Z00.00 ROUTINE CHECK-UP: ICD-10-CM

## 2023-08-22 LAB
ALBUMIN SERPL BCP-MCNC: 4.2 G/DL (ref 3.5–5)
ALP SERPL-CCNC: 79 U/L (ref 34–104)
ALT SERPL W P-5'-P-CCNC: 25 U/L (ref 7–52)
ANION GAP SERPL CALCULATED.3IONS-SCNC: 12 MMOL/L
AST SERPL W P-5'-P-CCNC: 24 U/L (ref 13–39)
BILIRUB SERPL-MCNC: 0.5 MG/DL (ref 0.2–1)
BUN SERPL-MCNC: 15 MG/DL (ref 5–25)
CALCIUM SERPL-MCNC: 9.2 MG/DL (ref 8.4–10.2)
CHLORIDE SERPL-SCNC: 104 MMOL/L (ref 96–108)
CO2 SERPL-SCNC: 25 MMOL/L (ref 21–32)
CREAT SERPL-MCNC: 0.63 MG/DL (ref 0.6–1.3)
GFR SERPL CREATININE-BSD FRML MDRD: 92 ML/MIN/1.73SQ M
GLUCOSE P FAST SERPL-MCNC: 93 MG/DL (ref 65–99)
POTASSIUM SERPL-SCNC: 4 MMOL/L (ref 3.5–5.3)
PROT SERPL-MCNC: 7.4 G/DL (ref 6.4–8.4)
SODIUM SERPL-SCNC: 141 MMOL/L (ref 135–147)
T3 SERPL-MCNC: 0.9 NG/ML
T4 FREE SERPL-MCNC: 0.68 NG/DL (ref 0.61–1.12)
TSH SERPL DL<=0.05 MIU/L-ACNC: 0.41 UIU/ML (ref 0.45–4.5)
VIT B12 SERPL-MCNC: 424 PG/ML (ref 180–914)

## 2023-08-22 PROCEDURE — 84443 ASSAY THYROID STIM HORMONE: CPT

## 2023-08-22 PROCEDURE — 84480 ASSAY TRIIODOTHYRONINE (T3): CPT

## 2023-08-22 PROCEDURE — 82607 VITAMIN B-12: CPT

## 2023-08-22 PROCEDURE — 80053 COMPREHEN METABOLIC PANEL: CPT

## 2023-08-22 PROCEDURE — 36415 COLL VENOUS BLD VENIPUNCTURE: CPT

## 2023-08-22 PROCEDURE — 84439 ASSAY OF FREE THYROXINE: CPT

## 2023-08-28 ENCOUNTER — OFFICE VISIT (OUTPATIENT)
Dept: OBGYN CLINIC | Facility: CLINIC | Age: 68
End: 2023-08-28
Payer: COMMERCIAL

## 2023-08-28 VITALS — BODY MASS INDEX: 40.64 KG/M2 | HEIGHT: 60 IN | WEIGHT: 207 LBS

## 2023-08-28 DIAGNOSIS — R20.0 BILATERAL HAND NUMBNESS: ICD-10-CM

## 2023-08-28 DIAGNOSIS — M65.322 TRIGGER INDEX FINGER OF LEFT HAND: Primary | ICD-10-CM

## 2023-08-28 DIAGNOSIS — M65.331 TRIGGER FINGER, RIGHT MIDDLE FINGER: ICD-10-CM

## 2023-08-28 PROCEDURE — 99213 OFFICE O/P EST LOW 20 MIN: CPT | Performed by: SURGERY

## 2023-08-28 RX ORDER — MELOXICAM 7.5 MG/1
7.5 TABLET ORAL DAILY
Qty: 30 TABLET | Refills: 1 | Status: SHIPPED | OUTPATIENT
Start: 2023-08-28

## 2023-08-28 RX ORDER — AMOXICILLIN 500 MG/1
500 TABLET, FILM COATED ORAL EVERY 8 HOURS
COMMUNITY
Start: 2023-08-25

## 2023-08-28 RX ORDER — CHLORHEXIDINE GLUCONATE 0.12 MG/ML
RINSE ORAL
COMMUNITY
Start: 2023-08-25

## 2023-08-28 NOTE — PROGRESS NOTES
ASSESSMENT/PLAN:      75 y.o male with left index and right long trigger fingers, and B/L hand numbness    Patient will return in 4 weeks for follow-up appointment and repeat injection for trigger fingers at that time. The patient verbalized understanding of exam findings and treatment plan. We engaged in the shared decision-making process and treatment options were discussed at length with the patient. Surgical and conservative management discussed today along with risks and benefits. Diagnoses and all orders for this visit:    Trigger index finger of left hand  -     meloxicam (Mobic) 7.5 mg tablet; Take 1 tablet (7.5 mg total) by mouth daily    Bilateral hand numbness    Trigger finger, right middle finger  -     meloxicam (Mobic) 7.5 mg tablet; Take 1 tablet (7.5 mg total) by mouth daily    Other orders  -     amoxicillin (AMOXIL) 500 MG tablet; Take 500 mg by mouth every 8 (eight) hours  -     chlorhexidine (PERIDEX) 0.12 % solution; RINSE IN MOUTH TWICE DAILY AS DIRECTED        Follow Up:  Return in about 4 weeks (around 9/25/2023) for Recheck, repeat injection. To Do Next Visit:  Re-evaluation of current issue    ____________________________________________________________________________________________________________________    CHIEF COMPLAINT:  Chief Complaint   Patient presents with   • Left Hand - Follow-up, Locking   • Right Hand - Follow-up, Locking       SUBJECTIVE:  Damon Brothers is a 75y.o. year old RHD female who presents for follow up evaluation. She notes numbness and tingling has decreased. She states trigger fingers are still bothering her. She states they are still locking daily and the pain has returned. Patient would like repeat injection today. Right middle finger tendon repair and left index trigger worse    I have personally reviewed all the relevant PMH, PSH, SH, FH, Medications and allergies.      PAST MEDICAL HISTORY:  Past Medical History:   Diagnosis Date   • GERD (gastroesophageal reflux disease)        PAST SURGICAL HISTORY:  Past Surgical History:   Procedure Laterality Date   •  SECTION     • HAND SURGERY         FAMILY HISTORY:  Family History   Problem Relation Age of Onset   • No Known Problems Mother    • Cancer Father    • Stomach cancer Father         mets; unknown age- at 37   • No Known Problems Daughter    • No Known Problems Maternal Grandmother    • No Known Problems Maternal Grandfather    • No Known Problems Paternal Grandmother    • No Known Problems Paternal Grandfather    • No Known Problems Son    • No Known Problems Son    • No Known Problems Maternal Aunt    • No Known Problems Maternal Aunt    • No Known Problems Maternal Aunt    • No Known Problems Maternal Aunt    • No Known Problems Maternal Aunt    • No Known Problems Maternal Aunt    • No Known Problems Maternal Aunt    • Breast cancer Neg Hx    • Colon cancer Neg Hx    • Endometrial cancer Neg Hx    • Ovarian cancer Neg Hx        SOCIAL HISTORY:  Social History     Tobacco Use   • Smoking status: Never   • Smokeless tobacco: Never   Vaping Use   • Vaping Use: Never used   Substance Use Topics   • Alcohol use: Never   • Drug use: Never       MEDICATIONS:    Current Outpatient Medications:   •  meloxicam (Mobic) 7.5 mg tablet, Take 1 tablet (7.5 mg total) by mouth daily, Disp: 30 tablet, Rfl: 1  •  acetaminophen (TYLENOL) 650 mg CR tablet, Take 1 tablet (650 mg total) by mouth every 8 (eight) hours as needed for mild pain (Patient not taking: Reported on 2023), Disp: 14 tablet, Rfl: 0  •  amoxicillin (AMOXIL) 500 MG tablet, Take 500 mg by mouth every 8 (eight) hours, Disp: , Rfl:   •  carbamide peroxide (DEBROX) 6.5 % otic solution, Administer 5 drops into both ears in the morning and 5 drops in the evening.  (Patient not taking: Reported on 2022), Disp: 15 mL, Rfl: 0  •  chlorhexidine (PERIDEX) 0.12 % solution, RINSE IN MOUTH TWICE DAILY AS DIRECTED, Disp: , Rfl:   •  lidocaine (Lidoderm) 5 %, Apply 1 patch topically over 12 hours daily Remove & Discard patch within 12 hours or as directed by MD (Patient not taking: Reported on 8/7/2023), Disp: 8 patch, Rfl: 0  •  magnesium citrate (CITROMA) 1.745 g/30 mL oral solution, Take 296 mL by mouth once for 1 dose (Patient not taking: Reported on 3/29/2023), Disp: 296 mL, Rfl: 0  •  omeprazole (PriLOSEC) 40 MG capsule, Take 1 capsule (40 mg total) by mouth daily (Patient not taking: Reported on 12/27/2022), Disp: 30 capsule, Rfl: 2  •  polyethylene glycol (MiraLax) 17 GM/SCOOP powder, Take 238 g by mouth once for 1 dose Take 238 g my mouth. Use as directed (Patient not taking: Reported on 3/29/2023), Disp: 238 g, Rfl: 0    ALLERGIES:  Allergies   Allergen Reactions   • Aspirin GI Intolerance       REVIEW OF SYSTEMS:  Review of Systems    VITALS:  There were no vitals filed for this visit. LABS:  HgA1c:   Lab Results   Component Value Date    HGBA1C 6.1 (H) 08/02/2023     BMP:   Lab Results   Component Value Date    CALCIUM 9.2 08/22/2023    K 4.0 08/22/2023    CO2 25 08/22/2023     08/22/2023    BUN 15 08/22/2023    CREATININE 0.63 08/22/2023       _____________________________________________________  PHYSICAL EXAMINATION:  General: well developed and well nourished, alert, oriented times 3 and appears comfortable  Psychiatric: Normal  HEENT: Normocephalic, Atraumatic Trachea Midline, No torticollis  Pulmonary: No audible wheezing or respiratory distress   Cardiovascular: No pitting edema, 2+ radial pulse   Abdominal/GI: abdomen non tender, non distended   Skin: No masses, erythema, lacerations, fluctation, ulcerations  Neurovascular: Sensation Intact to the Median, Ulnar, Radial Nerve, Motor Intact to the Median, Ulnar, Radial Nerve and Pulses Intact  Musculoskeletal: Normal, except as noted in detailed exam and in HPI.       MUSCULOSKELETAL EXAMINATION:    SILT  Composite fist     left long finger:  Positive palpable flexor tendon nodule at the the A1 pulley level. Positive tenderness to palpation over A1 pulley. Positive catching. Negative clicking.       right long finger:  Positive palpable flexor tendon nodule at the the A1 pulley level. Positive tenderness to palpation over A1 pulley. Positive catching.  Negative clicking.    ___________________________________________________  STUDIES REVIEWED:  No new imaging    PROCEDURES PERFORMED:  Procedures  No Procedures performed today    _____________________________________________________      Sudarshan Curiel    I,:  Ladan Caputo am acting as a scribe while in the presence of the attending physician.:       I,:  Monique Espinal MD personally performed the services described in this documentation    as scribed in my presence.:

## 2023-08-31 ENCOUNTER — OFFICE VISIT (OUTPATIENT)
Dept: SLEEP CENTER | Facility: CLINIC | Age: 68
End: 2023-08-31
Payer: COMMERCIAL

## 2023-08-31 ENCOUNTER — TELEPHONE (OUTPATIENT)
Dept: SLEEP CENTER | Facility: CLINIC | Age: 68
End: 2023-08-31

## 2023-08-31 VITALS
HEIGHT: 62 IN | WEIGHT: 207 LBS | BODY MASS INDEX: 38.09 KG/M2 | SYSTOLIC BLOOD PRESSURE: 110 MMHG | DIASTOLIC BLOOD PRESSURE: 52 MMHG

## 2023-08-31 DIAGNOSIS — E66.9 CLASS 2 OBESITY WITHOUT SERIOUS COMORBIDITY WITH BODY MASS INDEX (BMI) OF 38.0 TO 38.9 IN ADULT, UNSPECIFIED OBESITY TYPE: ICD-10-CM

## 2023-08-31 DIAGNOSIS — G47.33 OBSTRUCTIVE SLEEP APNEA-HYPOPNEA SYNDROME: Primary | ICD-10-CM

## 2023-08-31 PROCEDURE — 99213 OFFICE O/P EST LOW 20 MIN: CPT | Performed by: INTERNAL MEDICINE

## 2023-08-31 NOTE — PROGRESS NOTES
Sleep Medicine Outpatient Follow Up Note   Ed Funez 75 y.o. female MRN: [id number]  9/3/2023      Reason for Consultation:    Chief Complaint   Patient presents with   • Sleep Apnea         Assessment/Plan:    1. Obstructive sleep apnea-hypopnea syndrome  Assessment & Plan:  Mild CHEMO with AHI 5.1. This is associated with snoring and excessive daytime sleepiness. Unfortunately she has not done well with CPAP and brought her machine to return to [facility] today. She has tried multiple mask including nasal pillows and fullface mask. We discussed alternative options for the treatment of mild obstructive sleep apnea including dental appliances, positional therapy, weight loss. Currently she feels that weight loss is probably her best option. I have placed a referral for weight management. She can follow-up with us as needed if she needs to restart CPAP or be referred for dental appliance therapy. Her sleep apnea is too mild to qualify for hypoglossal nerve stimulation    Orders:  -     Discontinue CPAP    2. Class 2 obesity without serious comorbidity with body mass index (BMI) of 38.0 to 38.9 in adult, unspecified obesity type  -     Ambulatory Referral to Weight Management; Future        Health Maintenance  Immunization History   Administered Date(s) Administered   • Pneumococcal Conjugate Vaccine 20-valent (Pcv20), Polysace 05/16/2022   • Tdap 05/16/2022        Return if symptoms worsen or fail to improve. History of Present Illness   HPI:  Ed Funez is a 75 y.o. female who has a past medical history of mild obstructive sleep apnea, GERD who is presenting for follow-up of CHEMO    Encounter was performed with official . 12/2022 PSG AHI 5.1. This is associated with symptoms of snoring, frequent awakenings overnight, often for urination. PLM index of 51.6. She has been started on auto CPAP. Last visit was in 4/4/23 with Geraldine Damian.   She was using it around 4 hours for only 17% of the time. Don Mcmahan made a pressure change to 5-9cm H2O. She has tried nasal pillows and full face mask. She is here for follow up. She is using it more but she has a lot of trouble with the mask. She cannot tolerate CPAP so much that she wants to stop using it. She feels that CPAP has not benefited her sleep. Snoring is eliminated with CPAP. she feels like it is difficult to fall asleep with CPAP and she wakes up frequently with CPAP. She continues to have excessive daytime sleepiness although her use of CPAP is often less than 4 hours. No significant symptoms of restless legs, parasomnias, cataplexy.     Dalbo: 14    Historical Information   Past Medical History:   Diagnosis Date   • GERD (gastroesophageal reflux disease)      Past Surgical History:   Procedure Laterality Date   •  SECTION     • HAND SURGERY       Family History   Problem Relation Age of Onset   • No Known Problems Mother    • Cancer Father    • Stomach cancer Father         mets; unknown age- at 37   • No Known Problems Daughter    • No Known Problems Maternal Grandmother    • No Known Problems Maternal Grandfather    • No Known Problems Paternal Grandmother    • No Known Problems Paternal Grandfather    • No Known Problems Son    • No Known Problems Son    • No Known Problems Maternal Aunt    • No Known Problems Maternal Aunt    • No Known Problems Maternal Aunt    • No Known Problems Maternal Aunt    • No Known Problems Maternal Aunt    • No Known Problems Maternal Aunt    • No Known Problems Maternal Aunt    • Breast cancer Neg Hx    • Colon cancer Neg Hx    • Endometrial cancer Neg Hx    • Ovarian cancer Neg Hx          Meds/Allergies     Current Outpatient Medications:   •  chlorhexidine (PERIDEX) 0.12 % solution, RINSE IN MOUTH TWICE DAILY AS DIRECTED, Disp: , Rfl:   •  meloxicam (Mobic) 7.5 mg tablet, Take 1 tablet (7.5 mg total) by mouth daily, Disp: 30 tablet, Rfl: 1  •  acetaminophen (TYLENOL) 650 mg CR tablet, Take 1 tablet (650 mg total) by mouth every 8 (eight) hours as needed for mild pain (Patient not taking: Reported on 8/7/2023), Disp: 14 tablet, Rfl: 0  •  amoxicillin (AMOXIL) 500 MG tablet, Take 500 mg by mouth every 8 (eight) hours, Disp: , Rfl:   •  carbamide peroxide (DEBROX) 6.5 % otic solution, Administer 5 drops into both ears in the morning and 5 drops in the evening. (Patient not taking: Reported on 5/25/2022), Disp: 15 mL, Rfl: 0  •  lidocaine (Lidoderm) 5 %, Apply 1 patch topically over 12 hours daily Remove & Discard patch within 12 hours or as directed by MD (Patient not taking: Reported on 8/7/2023), Disp: 8 patch, Rfl: 0  •  magnesium citrate (CITROMA) 1.745 g/30 mL oral solution, Take 296 mL by mouth once for 1 dose (Patient not taking: Reported on 3/29/2023), Disp: 296 mL, Rfl: 0  •  omeprazole (PriLOSEC) 40 MG capsule, Take 1 capsule (40 mg total) by mouth daily (Patient not taking: Reported on 12/27/2022), Disp: 30 capsule, Rfl: 2  •  polyethylene glycol (MiraLax) 17 GM/SCOOP powder, Take 238 g by mouth once for 1 dose Take 238 g my mouth. Use as directed (Patient not taking: Reported on 3/29/2023), Disp: 238 g, Rfl: 0  •  pravastatin (PRAVACHOL) 20 mg tablet, Take 1 tablet (20 mg total) by mouth daily at bedtime, Disp: 30 tablet, Rfl: 5  Allergies   Allergen Reactions   • Aspirin GI Intolerance       Vitals: Blood pressure 110/52, height 5' 1.52" (1.563 m), weight 93.9 kg (207 lb), not currently breastfeeding. Body mass index is 38.45 kg/m². Physical Exam  Vitals and nursing note reviewed. Constitutional:       General: She is not in acute distress. Appearance: She is well-developed. She is not ill-appearing, toxic-appearing or diaphoretic. HENT:      Head: Normocephalic and atraumatic. Nose: Nose normal.   Eyes:      General: No scleral icterus. Extraocular Movements: Extraocular movements intact.       Conjunctiva/sclera: Conjunctivae normal.   Cardiovascular: Rate and Rhythm: Normal rate. Pulmonary:      Effort: Pulmonary effort is normal. No respiratory distress. Breath sounds: No stridor. Abdominal:      General: There is no distension. Palpations: Abdomen is soft. Tenderness: There is no guarding. Musculoskeletal:         General: No swelling. Cervical back: Normal range of motion and neck supple. No rigidity. Right lower leg: No edema. Left lower leg: No edema. Skin:     General: Skin is warm and dry. Coloration: Skin is not jaundiced. Neurological:      General: No focal deficit present. Mental Status: She is alert and oriented to person, place, and time. Mental status is at baseline. Psychiatric:         Mood and Affect: Mood normal.             Labs: I have personally reviewed pertinent lab results. ABG: No results found for: "PHART", "XBA3QIO", "PO2ART", "WHG5UPG", "Q7FRYOVV", "BEART", "SOURCE",   BNP: No results found for: "BNP",   CBC:  Lab Results   Component Value Date    WBC 7.68 08/02/2023    HGB 13.3 08/02/2023    HCT 40.6 08/02/2023    MCV 92 08/02/2023     08/02/2023    EOSPCT 2 08/02/2023    EOSABS 0.15 08/02/2023    NEUTOPHILPCT 48 08/02/2023    LYMPHOPCT 44 08/02/2023   ,   CMP:   Lab Results   Component Value Date    SODIUM 141 08/22/2023    K 4.0 08/22/2023     08/22/2023    CO2 25 08/22/2023    BUN 15 08/22/2023    CREATININE 0.63 08/22/2023    CALCIUM 9.2 08/22/2023    AST 24 08/22/2023    ALT 25 08/22/2023    ALKPHOS 79 08/22/2023    EGFR 92 08/22/2023   ,   PT/INR: No results found for: "PT", "INR",   Ferrtin: No components found for: "FERRTIN",  Magensium: No results found for: "MAGNESIUM",      Imaging and other studies: I have personally reviewed pertinent reports.    and I have personally reviewed pertinent films in PACS      Sleep Study:  December 2022, LESLIE 5.1    Compliance data:  8/27/2023  63% use more than 4 hours  Average use 4 hours and 26 minutes  AirSense 11 5-15 cm H2O with EPR set at 2  Median pressure 5.8, 95th percentile 8.3  Median leak 13.4, 95th percentile 33.7  AHI 1.2      Holly Morillo MD  Pulmonary, Critical Care and Sleep Medicine  [facility]     Portions of the record may have been created with voice recognition software. Occasional wrong word or "sound a like" substitutions may have occurred due to the inherent limitations of voice recognition software. Please read the chart carefully and recognize, using context, where substitutions have occurred.

## 2023-09-01 ENCOUNTER — OFFICE VISIT (OUTPATIENT)
Dept: FAMILY MEDICINE CLINIC | Facility: CLINIC | Age: 68
End: 2023-09-01

## 2023-09-01 VITALS
WEIGHT: 206.2 LBS | DIASTOLIC BLOOD PRESSURE: 71 MMHG | HEIGHT: 61 IN | OXYGEN SATURATION: 96 % | TEMPERATURE: 98.4 F | BODY MASS INDEX: 38.93 KG/M2 | SYSTOLIC BLOOD PRESSURE: 110 MMHG | RESPIRATION RATE: 18 BRPM | HEART RATE: 64 BPM

## 2023-09-01 DIAGNOSIS — E78.2 MIXED HYPERLIPIDEMIA: Primary | ICD-10-CM

## 2023-09-01 DIAGNOSIS — R53.83 OTHER FATIGUE: ICD-10-CM

## 2023-09-01 LAB
DME PARACHUTE DELIVERY DATE REQUESTED: NORMAL
DME PARACHUTE ITEM DESCRIPTION: NORMAL
DME PARACHUTE ORDER STATUS: NORMAL
DME PARACHUTE SUPPLIER NAME: NORMAL
DME PARACHUTE SUPPLIER PHONE: NORMAL

## 2023-09-01 PROCEDURE — 99213 OFFICE O/P EST LOW 20 MIN: CPT | Performed by: FAMILY MEDICINE

## 2023-09-01 RX ORDER — PRAVASTATIN SODIUM 20 MG
20 TABLET ORAL
Qty: 30 TABLET | Refills: 5 | Status: SHIPPED | OUTPATIENT
Start: 2023-09-01

## 2023-09-01 NOTE — ASSESSMENT & PLAN NOTE
Patient with BMI of 38. Patient is desiring weight loss and was previously referred to nutrition. Patient now wanting to hear all options for possible weight loss. Referral to weight loss management placed.

## 2023-09-01 NOTE — ASSESSMENT & PLAN NOTE
Patient with continued fatigue that has been worked up. Blood work has all been unremarkable. Patient recently saw sleep medicine. She was diagnosed with mild CHEMO but per patient no longer requiring CPAP. Unsure if this is reason for fatigue or contributing factor but we will continue to monitor. Patient to sleep loss management.

## 2023-09-01 NOTE — ASSESSMENT & PLAN NOTE
Lab Results   Component Value Date    CHOLESTEROL 295 (H) 08/02/2023    TRIG 176 (H) 08/02/2023    HDL 82 08/02/2023    LDLCALC 178 (H) 08/02/2023       Patient with elevated cholesterol level 295 and LDL of 178. Patient has been noted to have elevated cholesterol levels for over 1 year despite lifestyle modification. Patient also found to be prediabetic. Risk and benefits of starting statin discussed with patient. We will start patient on low dose pravastatin 20 mg as patient is worried about muscle cramps as she noted people reported online.

## 2023-09-01 NOTE — PROGRESS NOTES
Name: Li Casey      : 1955      MRN: [id number]  Encounter Provider: Lesli Llamas MD  Encounter Date: 2023   Encounter department: [address]     1. Mixed hyperlipidemia  Assessment & Plan:    Lab Results   Component Value Date    CHOLESTEROL 295 (H) 2023    TRIG 176 (H) 2023    HDL 82 2023    LDLCALC 178 (H) 2023       Patient with elevated cholesterol level 295 and LDL of 178. Patient has been noted to have elevated cholesterol levels for over 1 year despite lifestyle modification. Patient also found to be prediabetic. Risk and benefits of starting statin discussed with patient. We will start patient on low dose pravastatin 20 mg as patient is worried about muscle cramps as she noted people reported online. Orders:  -     pravastatin (PRAVACHOL) 20 mg tablet; Take 1 tablet (20 mg total) by mouth daily at bedtime    2. BMI 38.0-38.9,adult  Assessment & Plan:  Patient with BMI of 38. Patient is desiring weight loss and was previously referred to nutrition. Patient now wanting to hear all options for possible weight loss. Referral to weight loss management placed. Orders:  -     Ambulatory Referral to Weight Management; Future    3. Other fatigue  Assessment & Plan:  Patient with continued fatigue that has been worked up. Blood work has all been unremarkable. Patient recently saw sleep medicine. She was diagnosed with mild CHEMO but per patient no longer requiring CPAP. Unsure if this is reason for fatigue or contributing factor but we will continue to monitor. Patient to sleep loss management. Subjective      Patient here for follow-up on fatigue and review of blood work. Review of Systems   Constitutional: Positive for fatigue. Negative for chills and fever. HENT: Negative for ear pain and sore throat. Eyes: Negative for pain and visual disturbance.    Respiratory: Negative for cough and shortness of breath. Cardiovascular: Negative for chest pain and palpitations. Gastrointestinal: Negative for abdominal pain and vomiting. Genitourinary: Negative for dysuria and hematuria. Musculoskeletal: Negative for arthralgias and back pain. Skin: Negative for color change and rash. Neurological: Negative for seizures and syncope. All other systems reviewed and are negative. Current Outpatient Medications on File Prior to Visit   Medication Sig   • amoxicillin (AMOXIL) 500 MG tablet Take 500 mg by mouth every 8 (eight) hours   • chlorhexidine (PERIDEX) 0.12 % solution RINSE IN MOUTH TWICE DAILY AS DIRECTED   • meloxicam (Mobic) 7.5 mg tablet Take 1 tablet (7.5 mg total) by mouth daily   • acetaminophen (TYLENOL) 650 mg CR tablet Take 1 tablet (650 mg total) by mouth every 8 (eight) hours as needed for mild pain (Patient not taking: Reported on 8/7/2023)   • carbamide peroxide (DEBROX) 6.5 % otic solution Administer 5 drops into both ears in the morning and 5 drops in the evening. (Patient not taking: Reported on 5/25/2022)   • lidocaine (Lidoderm) 5 % Apply 1 patch topically over 12 hours daily Remove & Discard patch within 12 hours or as directed by MD (Patient not taking: Reported on 8/7/2023)   • magnesium citrate (CITROMA) 1.745 g/30 mL oral solution Take 296 mL by mouth once for 1 dose (Patient not taking: Reported on 3/29/2023)   • omeprazole (PriLOSEC) 40 MG capsule Take 1 capsule (40 mg total) by mouth daily (Patient not taking: Reported on 12/27/2022)   • polyethylene glycol (MiraLax) 17 GM/SCOOP powder Take 238 g by mouth once for 1 dose Take 238 g my mouth.  Use as directed (Patient not taking: Reported on 3/29/2023)       Objective     /71 (BP Location: Left arm, Patient Position: Sitting, Cuff Size: Large)   Pulse 64   Temp 98.4 °F (36.9 °C) (Temporal)   Resp 18   Ht 5' 1" (1.549 m)   Wt 93.5 kg (206 lb 3.2 oz)   SpO2 96%   BMI 38.96 kg/m² Physical Exam  Constitutional:       General: She is awake. Appearance: Normal appearance. HENT:      Head: Normocephalic. Jaw: There is normal jaw occlusion. Eyes:      General: Lids are normal.   Cardiovascular:      Rate and Rhythm: Normal rate and regular rhythm. Heart sounds: Normal heart sounds, S1 normal and S2 normal.   Pulmonary:      Effort: Pulmonary effort is normal. No tachypnea or bradypnea. Breath sounds: Normal breath sounds and air entry. Abdominal:      General: Bowel sounds are normal.      Palpations: Abdomen is soft. Tenderness: There is no abdominal tenderness. Skin:     General: Skin is warm. Neurological:      Mental Status: She is alert and oriented to person, place, and time. Psychiatric:         Attention and Perception: Attention normal.         Behavior: Behavior is cooperative.        Vic Greene MD

## 2023-09-08 PROBLEM — Z13.6 SCREENING FOR CARDIOVASCULAR CONDITION: Status: RESOLVED | Noted: 2023-07-10 | Resolved: 2023-09-08

## 2023-09-25 ENCOUNTER — OFFICE VISIT (OUTPATIENT)
Dept: OBGYN CLINIC | Facility: CLINIC | Age: 68
End: 2023-09-25
Payer: COMMERCIAL

## 2023-09-25 VITALS
DIASTOLIC BLOOD PRESSURE: 52 MMHG | BODY MASS INDEX: 38.89 KG/M2 | WEIGHT: 206 LBS | SYSTOLIC BLOOD PRESSURE: 117 MMHG | HEIGHT: 61 IN

## 2023-09-25 DIAGNOSIS — M79.642 BILATERAL HAND PAIN: ICD-10-CM

## 2023-09-25 DIAGNOSIS — R20.0 BILATERAL HAND NUMBNESS: Primary | ICD-10-CM

## 2023-09-25 DIAGNOSIS — M65.331 TRIGGER FINGER, RIGHT MIDDLE FINGER: ICD-10-CM

## 2023-09-25 DIAGNOSIS — M79.641 BILATERAL HAND PAIN: ICD-10-CM

## 2023-09-25 DIAGNOSIS — M65.322 TRIGGER INDEX FINGER OF LEFT HAND: ICD-10-CM

## 2023-09-25 PROCEDURE — 99213 OFFICE O/P EST LOW 20 MIN: CPT | Performed by: SURGERY

## 2023-09-25 NOTE — PROGRESS NOTES
ASSESSMENT/PLAN:      75 y.o male with left index and right long trigger fingers, and B/L hand numbness    · Physical exam was performed and findings consistent with Carpal and Cubital Tunnel  · Ultrasound ordered for diagnostic testing of Carpal and Cubital Tunnel for Bilateral UE  · We discussed a return after testing to discuss future treatments for her Numbness and trigger fingers with possible surgery to discuss  · She is to be more consistently at night  · NSAIDs as needed for trigger finger discomfort        The patient verbalized understanding of exam findings and treatment plan. We engaged in the shared decision-making process and treatment options were discussed at length with the patient. Surgical and conservative management discussed today along with risks and benefits. Diagnoses and all orders for this visit:    Bilateral hand numbness  -     US MSK limited; Future    Trigger finger, right middle finger    Trigger index finger of left hand    Bilateral hand pain  -     US MSK limited; Future        Follow Up:  Return for After Ultrasound. To Do Next Visit:  Re-evaluation of current issue    ____________________________________________________________________________________________________________________    CHIEF COMPLAINT:  Chief Complaint   Patient presents with   • Left Hand - Follow-up   • Right Hand - Follow-up       SUBJECTIVE:  Elvin Pittman is a 75y.o. year old RHD female who presents for follow up evaluation of Trigger fingers and bilateral hand numbness    Pt states that she continues to have some locking in her Left middle finger and discomfort in her Right index finger. She does not wish to have any more injections as she feles her middle finger has a mass at the previous injection site.    She also has continued complaints of bilateral hand numbness, especially in the AM. She has been inconsistently splinting but does report improvement with splinting   Has numbness tingling into wrist and hands all fingers        I have personally reviewed all the relevant PMH, PSH, SH, FH, Medications and allergies.      PAST MEDICAL HISTORY:  Past Medical History:   Diagnosis Date   • GERD (gastroesophageal reflux disease)        PAST SURGICAL HISTORY:  Past Surgical History:   Procedure Laterality Date   •  SECTION     • HAND SURGERY         FAMILY HISTORY:  Family History   Problem Relation Age of Onset   • No Known Problems Mother    • Cancer Father    • Stomach cancer Father         mets; unknown age- at 37   • No Known Problems Daughter    • No Known Problems Maternal Grandmother    • No Known Problems Maternal Grandfather    • No Known Problems Paternal Grandmother    • No Known Problems Paternal Grandfather    • No Known Problems Son    • No Known Problems Son    • No Known Problems Maternal Aunt    • No Known Problems Maternal Aunt    • No Known Problems Maternal Aunt    • No Known Problems Maternal Aunt    • No Known Problems Maternal Aunt    • No Known Problems Maternal Aunt    • No Known Problems Maternal Aunt    • Breast cancer Neg Hx    • Colon cancer Neg Hx    • Endometrial cancer Neg Hx    • Ovarian cancer Neg Hx        SOCIAL HISTORY:  Social History     Tobacco Use   • Smoking status: Never   • Smokeless tobacco: Never   Vaping Use   • Vaping Use: Never used   Substance Use Topics   • Alcohol use: Never   • Drug use: Never       MEDICATIONS:    Current Outpatient Medications:   •  amoxicillin (AMOXIL) 500 MG tablet, Take 500 mg by mouth every 8 (eight) hours, Disp: , Rfl:   •  meloxicam (Mobic) 7.5 mg tablet, Take 1 tablet (7.5 mg total) by mouth daily, Disp: 30 tablet, Rfl: 1  •  pravastatin (PRAVACHOL) 20 mg tablet, Take 1 tablet (20 mg total) by mouth daily at bedtime, Disp: 30 tablet, Rfl: 5  •  acetaminophen (TYLENOL) 650 mg CR tablet, Take 1 tablet (650 mg total) by mouth every 8 (eight) hours as needed for mild pain (Patient not taking: Reported on 2023), Disp: 14 tablet, Rfl: 0  •  carbamide peroxide (DEBROX) 6.5 % otic solution, Administer 5 drops into both ears in the morning and 5 drops in the evening. (Patient not taking: Reported on 5/25/2022), Disp: 15 mL, Rfl: 0  •  chlorhexidine (PERIDEX) 0.12 % solution, RINSE IN MOUTH TWICE DAILY AS DIRECTED (Patient not taking: Reported on 9/25/2023), Disp: , Rfl:   •  lidocaine (Lidoderm) 5 %, Apply 1 patch topically over 12 hours daily Remove & Discard patch within 12 hours or as directed by MD (Patient not taking: Reported on 8/7/2023), Disp: 8 patch, Rfl: 0  •  magnesium citrate (CITROMA) 1.745 g/30 mL oral solution, Take 296 mL by mouth once for 1 dose (Patient not taking: Reported on 3/29/2023), Disp: 296 mL, Rfl: 0  •  omeprazole (PriLOSEC) 40 MG capsule, Take 1 capsule (40 mg total) by mouth daily (Patient not taking: Reported on 12/27/2022), Disp: 30 capsule, Rfl: 2  •  polyethylene glycol (MiraLax) 17 GM/SCOOP powder, Take 238 g by mouth once for 1 dose Take 238 g my mouth. Use as directed (Patient not taking: Reported on 3/29/2023), Disp: 238 g, Rfl: 0    ALLERGIES:  Allergies   Allergen Reactions   • Aspirin GI Intolerance       REVIEW OF SYSTEMS:  Review of Systems   Constitutional: Negative for chills and fever. HENT: Negative for ear pain and sore throat. Eyes: Negative for pain and visual disturbance. Respiratory: Negative for cough and shortness of breath. Cardiovascular: Negative for chest pain and palpitations. Gastrointestinal: Negative for abdominal pain and vomiting. Genitourinary: Negative for dysuria and hematuria. Musculoskeletal: Negative for arthralgias and back pain. Skin: Negative for color change and rash. Neurological: Positive for numbness. Negative for seizures and syncope. All other systems reviewed and are negative.       VITALS:  Vitals:    09/25/23 1206   BP: 117/52       LABS:  HgA1c:   Lab Results   Component Value Date    HGBA1C 6.1 (H) 08/02/2023     BMP:   Lab Results Component Value Date    CALCIUM 9.2 08/22/2023    K 4.0 08/22/2023    CO2 25 08/22/2023     08/22/2023    BUN 15 08/22/2023    CREATININE 0.63 08/22/2023       _____________________________________________________  PHYSICAL EXAMINATION:  General: well developed and well nourished, alert, oriented times 3 and appears comfortable  Psychiatric: Normal  HEENT: Normocephalic, Atraumatic Trachea Midline, No torticollis  Pulmonary: No audible wheezing or respiratory distress   Cardiovascular: No pitting edema, 2+ radial pulse   Abdominal/GI: abdomen non tender, non distended   Skin: No masses, erythema, lacerations, fluctation, ulcerations  Neurovascular: Sensation Intact to the Median, Ulnar, Radial Nerve, Motor Intact to the Median, Ulnar, Radial Nerve and Pulses Intact  Musculoskeletal: Normal, except as noted in detailed exam and in HPI.       MUSCULOSKELETAL EXAMINATION:    SILT  Composite fist     Cubital Tunnel Compression   Positve Bilateral  ___________________________________________________  STUDIES REVIEWED:  No new imaging    PROCEDURES PERFORMED:  Procedures  No Procedures performed today    _____________________________________________________      Cherise Manner    I,:  John Myers am acting as a scribe while in the presence of the attending physician.:       I,:  Riya Peters MD personally performed the services described in this documentation    as scribed in my presence.:

## 2023-09-28 DIAGNOSIS — E78.2 MIXED HYPERLIPIDEMIA: ICD-10-CM

## 2023-09-28 RX ORDER — PRAVASTATIN SODIUM 20 MG
20 TABLET ORAL
Qty: 90 TABLET | Refills: 2 | Status: SHIPPED | OUTPATIENT
Start: 2023-09-28

## 2023-10-31 ENCOUNTER — OFFICE VISIT (OUTPATIENT)
Dept: FAMILY MEDICINE CLINIC | Facility: CLINIC | Age: 68
End: 2023-10-31

## 2023-10-31 VITALS
TEMPERATURE: 98.8 F | BODY MASS INDEX: 39.84 KG/M2 | HEART RATE: 69 BPM | WEIGHT: 211 LBS | DIASTOLIC BLOOD PRESSURE: 66 MMHG | SYSTOLIC BLOOD PRESSURE: 117 MMHG | OXYGEN SATURATION: 97 % | HEIGHT: 61 IN

## 2023-10-31 DIAGNOSIS — E78.2 MIXED HYPERLIPIDEMIA: Primary | ICD-10-CM

## 2023-10-31 DIAGNOSIS — H53.8 BLURRY VISION: ICD-10-CM

## 2023-10-31 PROCEDURE — 99213 OFFICE O/P EST LOW 20 MIN: CPT | Performed by: FAMILY MEDICINE

## 2023-10-31 RX ORDER — PRAVASTATIN SODIUM 20 MG
20 TABLET ORAL
Qty: 90 TABLET | Refills: 2 | Status: SHIPPED | OUTPATIENT
Start: 2023-10-31

## 2023-10-31 NOTE — PROGRESS NOTES
Name: Susannah Arroyo      : 1955      MRN: [id number]  Encounter Provider: Rubné Rosado MD  Encounter Date: 10/31/2023   Encounter department: [address]     1. Mixed hyperlipidemia  Assessment & Plan:    Lab Results   Component Value Date    CHOLESTEROL 295 (H) 2023    TRIG 176 (H) 2023    HDL 82 2023    LDLCALC 178 (H) 2023     Patient reports that she stopped taking the cholesterol medicine as she thinks it was making her "cataracts" worse. On further evaluation patient has had intermittent blurry vision over the past year. Reported to the patient since unlikely of the medication that she has had this prior to taking the pravastatin. Will reorder pravastatin 20 mg nightly and repeat CMP in 3 months. AST/ALT/bilirubin within normal limits in  August.  Follow-up in 3 months. Orders:  -     pravastatin (PRAVACHOL) 20 mg tablet; Take 1 tablet (20 mg total) by mouth daily at bedtime  -     Comprehensive metabolic panel; Future    2. Blurry vision  Assessment & Plan:  Patient reporting blurry vision left eye, over the past year. Per patient she has never really mentioned this to any providers for. Patient reports that she believes she may have cataracts. On exam EOM intact and pupils round reactive to light. Patient denies any pain with eye movement or light. We will send her to ophthalmology for evaluation. Orders:  -     Ambulatory Referral to Ophthalmology; Future           Subjective      Patient to follow-up on cholesterol. Patient was previously started on pravastatin given abnormal lipid panel. Patient reports she took the medication for a few weeks but stopped because she thought it was making her "eyes side worse". On further evaluation patient reports that she has been having problems with had slight for over a year.   And has had this problem before and after starting and stopping medication. Review of Systems   Constitutional:  Negative for chills and fever. HENT:  Negative for ear pain and sore throat. Eyes:  Positive for visual disturbance. Negative for photophobia, pain, discharge, redness and itching. Respiratory:  Negative for cough and shortness of breath. Cardiovascular:  Negative for chest pain and palpitations. Gastrointestinal:  Negative for abdominal pain and vomiting. Genitourinary:  Negative for dysuria and hematuria. Musculoskeletal:  Negative for arthralgias and back pain. Skin:  Negative for color change and rash. Neurological:  Negative for seizures and syncope. All other systems reviewed and are negative. Current Outpatient Medications on File Prior to Visit   Medication Sig   • amoxicillin (AMOXIL) 500 MG tablet Take 500 mg by mouth every 8 (eight) hours   • meloxicam (Mobic) 7.5 mg tablet Take 1 tablet (7.5 mg total) by mouth daily   • [DISCONTINUED] pravastatin (PRAVACHOL) 20 mg tablet TAKE 1 TABLET BY MOUTH DAILY AT BEDTIME   • acetaminophen (TYLENOL) 650 mg CR tablet Take 1 tablet (650 mg total) by mouth every 8 (eight) hours as needed for mild pain (Patient not taking: Reported on 8/7/2023)   • carbamide peroxide (DEBROX) 6.5 % otic solution Administer 5 drops into both ears in the morning and 5 drops in the evening.  (Patient not taking: Reported on 5/25/2022)   • chlorhexidine (PERIDEX) 0.12 % solution RINSE IN MOUTH TWICE DAILY AS DIRECTED (Patient not taking: Reported on 9/25/2023)   • lidocaine (Lidoderm) 5 % Apply 1 patch topically over 12 hours daily Remove & Discard patch within 12 hours or as directed by MD (Patient not taking: Reported on 8/7/2023)   • magnesium citrate (CITROMA) 1.745 g/30 mL oral solution Take 296 mL by mouth once for 1 dose (Patient not taking: Reported on 3/29/2023)   • omeprazole (PriLOSEC) 40 MG capsule Take 1 capsule (40 mg total) by mouth daily (Patient not taking: Reported on 12/27/2022)   • polyethylene glycol (MiraLax) 17 GM/SCOOP powder Take 238 g by mouth once for 1 dose Take 238 g my mouth. Use as directed (Patient not taking: Reported on 3/29/2023)       Objective     /66 (BP Location: Left arm, Patient Position: Sitting, Cuff Size: Standard)   Pulse 69   Temp 98.8 °F (37.1 °C) (Temporal)   Ht 5' 1" (1.549 m)   Wt 95.7 kg (211 lb)   SpO2 97%   BMI 39.87 kg/m²     Physical Exam  Constitutional:       General: She is awake. Appearance: Normal appearance. HENT:      Head: Normocephalic. Jaw: There is normal jaw occlusion. Eyes:      General: Lids are normal.         Right eye: No discharge. Left eye: No discharge. Extraocular Movements: Extraocular movements intact. Conjunctiva/sclera: Conjunctivae normal.      Pupils: Pupils are equal, round, and reactive to light. Cardiovascular:      Rate and Rhythm: Normal rate and regular rhythm. Heart sounds: Normal heart sounds, S1 normal and S2 normal.   Pulmonary:      Effort: Pulmonary effort is normal. No tachypnea or bradypnea. Breath sounds: Normal breath sounds and air entry. Abdominal:      General: Bowel sounds are normal.      Palpations: Abdomen is soft. Tenderness: There is no abdominal tenderness. Skin:     General: Skin is warm. Neurological:      Mental Status: She is alert and oriented to person, place, and time. Psychiatric:         Attention and Perception: Attention normal.         Behavior: Behavior is cooperative.      Lesli Llamas MD

## 2023-10-31 NOTE — ASSESSMENT & PLAN NOTE
Patient reporting blurry vision left eye, over the past year. Per patient she has never really mentioned this to any providers for. Patient reports that she believes she may have cataracts. On exam EOM intact and pupils round reactive to light. Patient denies any pain with eye movement or light. We will send her to ophthalmology for evaluation.

## 2023-10-31 NOTE — ASSESSMENT & PLAN NOTE
Lab Results   Component Value Date    CHOLESTEROL 295 (H) 08/02/2023    TRIG 176 (H) 08/02/2023    HDL 82 08/02/2023    LDLCALC 178 (H) 08/02/2023     Patient reports that she stopped taking the cholesterol medicine as she thinks it was making her "cataracts" worse. On further evaluation patient has had intermittent blurry vision over the past year. Reported to the patient since unlikely of the medication that she has had this prior to taking the pravastatin. Will reorder pravastatin 20 mg nightly and repeat CMP in 3 months. AST/ALT/bilirubin within normal limits in  August.  Follow-up in 3 months.

## 2023-11-13 ENCOUNTER — HOSPITAL ENCOUNTER (OUTPATIENT)
Dept: RADIOLOGY | Facility: HOSPITAL | Age: 68
Discharge: HOME | End: 2023-11-13
Attending: SURGERY
Payer: COMMERCIAL

## 2023-11-13 ENCOUNTER — HOSPITAL ENCOUNTER (OUTPATIENT)
Dept: RADIOLOGY | Facility: HOSPITAL | Age: 68
Discharge: HOME | End: 2023-11-13
Payer: COMMERCIAL

## 2023-11-13 DIAGNOSIS — M79.641 BILATERAL HAND PAIN: ICD-10-CM

## 2023-11-13 DIAGNOSIS — M79.642 BILATERAL HAND PAIN: ICD-10-CM

## 2023-11-13 DIAGNOSIS — R20.0 BILATERAL HAND NUMBNESS: ICD-10-CM

## 2023-11-13 PROCEDURE — 76882 US LMTD JT/FCL EVL NVASC XTR: CPT

## 2024-01-25 ENCOUNTER — OFFICE VISIT (OUTPATIENT)
Dept: OBGYN CLINIC | Facility: CLINIC | Age: 69
End: 2024-01-25
Payer: COMMERCIAL

## 2024-01-25 VITALS — HEIGHT: 61 IN | WEIGHT: 211 LBS | BODY MASS INDEX: 39.84 KG/M2

## 2024-01-25 DIAGNOSIS — M65.331 TRIGGER FINGER, RIGHT MIDDLE FINGER: Primary | ICD-10-CM

## 2024-01-25 DIAGNOSIS — M65.322 TRIGGER INDEX FINGER OF LEFT HAND: ICD-10-CM

## 2024-01-25 DIAGNOSIS — G56.21 CUBITAL TUNNEL SYNDROME ON RIGHT: ICD-10-CM

## 2024-01-25 DIAGNOSIS — G56.01 CARPAL TUNNEL SYNDROME ON RIGHT: ICD-10-CM

## 2024-01-25 PROCEDURE — 99214 OFFICE O/P EST MOD 30 MIN: CPT | Performed by: SURGERY

## 2024-01-25 NOTE — PROGRESS NOTES
ASSESSMENT/PLAN:      68-year-old female with left index and right long trigger fingers and bilateral carpal and cubital tunnel.    The [facility] ultrasounds were reviewed which demonstrate bilateral carpal and cubital tunnel syndrome. Continued non operative versus surgical intervention was discussed. The patient elected to proceed with surgical intervention in the form of right carpal tunnel release, right cubital tunnel release and right long trigger finger release. Risks of the surgery were discussed and are detailed below.  The patient verbally stated they understood those risks and would like to proceed with the surgery. Surgical consent was signed in the office today. The patient was advised she will keep the postoperative dressings on for 4 days then she can remove these and begin washing with soap and water. I will see her the day of surgery.    Right middle trigger finger release, carpal tunnel release, cubital tunnel release.  Ultrasound suggest ulnar nerve subluxation, no clinical or symptomatic subluxation on examination.  Consent obtained  General anesthesia    The patient verbalized understanding of exam findings and treatment plan. We engaged in the shared decision-making process and treatment options were discussed at length with the patient. Surgical and conservative management discussed today along with risks and benefits.    Diagnoses and all orders for this visit:    Trigger finger, right middle finger  -     Case request operating room: RELEASE CUBITAL TUNNEL, RIGHT, RELEASE CARPAL TUNNEL, RIGHT, RELEASE TRIGGER FINGER, RIGHT MIDDLE; Standing  -     EKG 12 lead; Future  -     CBC and Platelet; Future  -     Case request operating room: RELEASE CUBITAL TUNNEL, RIGHT, RELEASE CARPAL TUNNEL, RIGHT, RELEASE TRIGGER FINGER, RIGHT MIDDLE    Trigger index finger of left hand    Carpal tunnel syndrome on right  -     Case request operating room: RELEASE CUBITAL TUNNEL, RIGHT, RELEASE CARPAL TUNNEL, RIGHT,  RELEASE TRIGGER FINGER, RIGHT MIDDLE; Standing  -     EKG 12 lead; Future  -     CBC and Platelet; Future  -     Case request operating room: RELEASE CUBITAL TUNNEL, RIGHT, RELEASE CARPAL TUNNEL, RIGHT, RELEASE TRIGGER FINGER, RIGHT MIDDLE    Cubital tunnel syndrome on right  -     Case request operating room: RELEASE CUBITAL TUNNEL, RIGHT, RELEASE CARPAL TUNNEL, RIGHT, RELEASE TRIGGER FINGER, RIGHT MIDDLE; Standing  -     EKG 12 lead; Future  -     CBC and Platelet; Future  -     Case request operating room: RELEASE CUBITAL TUNNEL, RIGHT, RELEASE CARPAL TUNNEL, RIGHT, RELEASE TRIGGER FINGER, RIGHT MIDDLE    Other orders  -     Nursing Communication Warmimg Interventions Implemented; Standing  -     Nursing Communication CHG bath, have staff wash entire body (neck down) per pre-op bathing protocol. Routine, evening prior to, and day of surgery.; Standing  -     Void on call to OR; Standing  -     Insert peripheral IV; Standing  -     Diet NPO; Sips with meds; Standing          Cubital Tunnel Release:   The anatomy and physiology of cubital tunnel syndrome were discussed with the patient today in the office.  Typically, increased elbow flexion activities decrease blood flow within the intraneural spaces, resulting in a feeling of numbness, tingling, weakness, or clumsiness within the hand and fingers.  Occasionally, anatomic structures such as medial elbow osteophytes, the medial head of the triceps, were subluxing ulnar nerve may result in increased pressure or aggravation at the cubital tunnel.  Typical signs and symptoms usually include numbness and tingling within the ring and small finger, weakness with , and weakness with pinch.  Conservative treatment and includes nocturnal bracing to keep the elbow in a semi-extended position, activity modification, therapy, and avoiding excessive elbow flexion activities.  A majority of patients typically respond to conservative treatment over a period of approximately  3-6 months.  Vitamin B6 one tablet daily over the counter may helpful to reduce symptoms.  EMG/NCV testing of the ulnar nerve at the elbow is not as reliable as carpal tunnel syndrome.  Surgical intervention in the form of in situ release of the ulnar nerve at the elbow or ulnar nerve transposition may be required in up to 20% of patients. The patient has elected to undergo cubital tunnel release.  The possibility of converting to a subcutaneous or submuscular ulnar nerve transposition depending on the nerve stability was discussed with the patient.  Typically, in the postoperative period, light activities are allowed immediately, driving is allowed when narcotic medications have stopped, and the incision may get wet after 5 days.  Heavy activities will be allowed after follow up appointment in 1-2 weeks.  While the pain within the ring and small finger of the hands generally improves rapidly, the numbness and tingling, as well as the strength, will slowly improve over a period of weeks to months.  Total recovery can take up to 18 months from the time of surgery.  Numbness and tingling near the incision, or near the medial aspect of the forearm was discussed with the patient.  The patient has an understanding of the above mentioned discussion. The risks and benefits of the procedure were explained to the patient, which include, but are not limited to: Bleeding, infection, recurrence, pain, scar, damage to tendons, damage to nerves, and damage to blood vessels, failure to give desired results and complications related to anesthesia.  These risks, along with alternative conservative treatment options, and postoperative protocols were voiced back and understood by the patient.  All questions were answered to the patient's satisfaction.  The patient agrees to comply with a standard postoperative protocol, and is willing to proceed.  Education was provided via written and auditory forms.  There were no barriers to  learning. Written handouts regarding wound care, incision and scar care, and general preoperative information was provided to the patient.  Prior to surgery, the patient may be requested to stop all anti-inflammatory medications.  Prophylactic aspirin, Plavix, and Coumadin may be allowed to be continued.  Medications including vitamin E., ginkgo, and fish oil are requested to be stopped approximately one week prior to surgery.  Hypertensive medications and beta blockers, if taken, should be continued. Vitamin B6 one tablet daily over the counter may helpful to reduce symptoms.  , Open Carpal Tunnel Release:   The anatomy and physiology of carpal tunnel syndrome was discussed with the patient today.  Increase pressure localized under the transverse carpal ligament can cause pain, numbness, tingling, or dysesthesias within the median nerve distribution as well as feelings of fatigue, clumsiness, or awkwardness.  These symptoms typically occur at night and worse in the morning upon waking.  Eventually, untreated carpal tunnel syndrome can result in weakness and permanent loss of muscle within the thenar compartment of the hand.  Treatment options were discussed with the patient.  Conservative treatment includes nocturnal resting splints to keep the nerve in a neutral position, ergonomic changes within the work or home environment, activity modification, and tendon gliding exercises.  Vitamin B6 one tablet daily over the counter may helpful to reduce symptoms.  Steroid injections within the carpal canal can help a majority of patients, however this is often self-limited in a majority of patients.  Surgical intervention to divide the transverse carpal ligament typically results in a long-lasting relief of the patient's complaints, with the recurrence rate of less than 1%. The patient has elected to undergo an open carpal tunnel release.  The palmar incision technique was discussed in the office with the patient today.    In the postoperative period, light activities are allowed immediately, driving is allowed when narcotic medication has stopped, and the bandages may be removed and incision may get wet after 2 days.  Heavy activities (lifting more than approximately 10 pounds) will be allowed after follow up appointment in 1-2 weeks.  While night symptoms (waking from sleep, pain, and discomfort in the hands) generally improves rapidly, the numbness and tingling as well as the strength will slowly improve over weeks to months depending on the chronicity and severity of the carpal tunnel syndrome.  Pillar pain and scar discomfort were discussed with the patient which are self-limiting conditions.  The risks of bleeding and infection from the surgery are less than 1%.  Risk of recurrence is approximately 0.5%.  The risks of nerve injury or nerve damage or damage to the blood vessels is approximately 1 in 1200. The patient has an understanding of the above mentioned discussion.The risks and benefits of the procedure were explained to the patient, which include, but are not limited to: Bleeding, infection, recurrence, pain, scar, damage to tendons, damage to nerves, and damage to blood vessels, failure to give desired results and complications related to anesthesia.  These risks, along with alternative conservative treatment options, and postoperative protocols were voiced back and understood by the patient.  All questions were answered to the patient's satisfaction.  The patient agrees to comply with a standard postoperative protocol, and is willing to proceed.  Education was provided via written and auditory forms.  There were no barriers to learning. Written handouts regarding wound care, incision and scar care, and general preoperative information was provided to the patient.  Prior to surgery, the patient may be requested to stop all anti-inflammatory medications.  Prophylactic aspirin, Plavix, and Coumadin may be allowed to be  continued.  Medications including vitamin E., ginkgo, and fish oil are requested to be stopped approximately one week prior to surgery.  Hypertensive medications and beta blockers, if taken, s, and Trigger Finger Release: The anatomy and physiology of trigger finger was discussed with the patient today in the office.  Edema and increased contact pressure within the flexor tendons at the A1 pulley can cause pain, crepitation, and limitation of function.  Treatment options include resting MP blocking splints to decrease edema, oral anti-inflammatory medications, home or formal therapy exercises, up to 2 steroid injections or surgical release.  While majority of patients do respond to conservative treatment, up to 20% may require surgical release.  The patient has elected release of the trigger finger.  The patient has elected to undergo a release of the A1 pulley (trigger finger).  A small incision will be made over the palmar aspect of the hand, the tendon sheath holding the flexor tendons will be released.  In the postoperative period, light activities are allowed immediately, driving is allowed when narcotic medication has stopped, and the incision may get wet after 2 days.  Heavy activities (lifting more than approximately 10 pounds) will be allowed after the follow up appointment in 1-2 weeks.  While the pain and discomfort within the wrist typically improves rapidly, some residual discomfort may be present for up to 6 weeks.  The nodule that is typically palpable in the palmar aspect of the hand will not be removed, as this would necessitate removal of a portion of the flexor tendon, however the catching, clicking, and locking should resolve.  Approximate success rate is 98%.The risks and benefits of the procedure were explained to the patient, which include, but are not limited to: Bleeding, infection, recurrence, pain, scar, damage to tendons, damage to nerves, and damage to blood vessels, need for future  surgery and complications related to anesthesia.  If bony work is done, risks also include malunion and nonunion.  These risks, along with alternative conservative treatment options, and postoperative protocols were voiced back and understood by the patient.  All questions were answered to the patient's satisfaction.  The patient agrees to comply with a standard postoperative protocol, and is willing to proceed.  Education was provided via written and auditory forms.  There were no barriers to learning. Written handouts regarding wound care, incision and scar care, and general preoperative information, as well as risks and benefits were provided to the patient.    Follow Up:  Return for After surgery.      To Do Next Visit:  Sutures out    ____________________________________________________________________________________________________________________________________________      CHIEF COMPLAINT:  Chief Complaint   Patient presents with    Right Wrist - Follow-up, Numbness    Left Wrist - Follow-up, Numbness       SUBJECTIVE:  Henna Ballard is a 68 y.o. year old RHD female who presents to the office today for follow up evaluation left index and right long trigger fingers and bilateral carpal and cubital tunnel. The patient states her right is worse than her left. She notes numbness and tingling to her bilateral index and long fingers. She states this is worse in the morning. She also notes continued locking to her right long and left index finger. She notes occ numbness and tingling to her small fingers. The patient states she had a previous tendon surgery right long finger after she broke a glass in her hand.       I have personally reviewed all the relevant PMH, PSH, SH, FH, Medications and allergies.     PAST MEDICAL HISTORY:  Past Medical History:   Diagnosis Date    GERD (gastroesophageal reflux disease)        PAST SURGICAL HISTORY:  Past Surgical History:   Procedure Laterality Date     SECTION       HAND SURGERY         FAMILY HISTORY:  Family History   Problem Relation Age of Onset    No Known Problems Mother     Cancer Father     Stomach cancer Father         mets; unknown age- at 43    No Known Problems Daughter     No Known Problems Maternal Grandmother     No Known Problems Maternal Grandfather     No Known Problems Paternal Grandmother     No Known Problems Paternal Grandfather     No Known Problems Son     No Known Problems Son     No Known Problems Maternal Aunt     No Known Problems Maternal Aunt     No Known Problems Maternal Aunt     No Known Problems Maternal Aunt     No Known Problems Maternal Aunt     No Known Problems Maternal Aunt     No Known Problems Maternal Aunt     Breast cancer Neg Hx     Colon cancer Neg Hx     Endometrial cancer Neg Hx     Ovarian cancer Neg Hx        SOCIAL HISTORY:  Social History     Tobacco Use    Smoking status: Never    Smokeless tobacco: Never   Vaping Use    Vaping status: Never Used   Substance Use Topics    Alcohol use: Never    Drug use: Never       MEDICATIONS:    Current Outpatient Medications:     amoxicillin (AMOXIL) 500 MG tablet, Take 500 mg by mouth every 8 (eight) hours, Disp: , Rfl:     meloxicam (Mobic) 7.5 mg tablet, Take 1 tablet (7.5 mg total) by mouth daily, Disp: 30 tablet, Rfl: 1    pravastatin (PRAVACHOL) 20 mg tablet, Take 1 tablet (20 mg total) by mouth daily at bedtime, Disp: 90 tablet, Rfl: 2    carbamide peroxide (DEBROX) 6.5 % otic solution, Administer 5 drops into both ears in the morning and 5 drops in the evening. (Patient not taking: Reported on 2022), Disp: 15 mL, Rfl: 0    chlorhexidine (PERIDEX) 0.12 % solution, RINSE IN MOUTH TWICE DAILY AS DIRECTED (Patient not taking: Reported on 2023), Disp: , Rfl:     ALLERGIES:  Allergies   Allergen Reactions    Aspirin GI Intolerance       REVIEW OF SYSTEMS:  Review of Systems   Constitutional:  Negative for chills and fever.   HENT:  Negative for drooling and sneezing.     Eyes:  Negative for redness.   Respiratory:  Negative for cough and wheezing.    Gastrointestinal:  Negative for nausea and vomiting.   Musculoskeletal:  Positive for arthralgias. Negative for joint swelling and myalgias.   Neurological:  Negative for weakness and numbness.   Psychiatric/Behavioral:  Negative for behavioral problems. The patient is not nervous/anxious.        VITALS:  There were no vitals filed for this visit.    LABS:  HgA1c:   Lab Results   Component Value Date    HGBA1C 6.1 (H) 08/02/2023     BMP:   Lab Results   Component Value Date    CALCIUM 9.2 08/22/2023    K 4.0 08/22/2023    CO2 25 08/22/2023     08/22/2023    BUN 15 08/22/2023    CREATININE 0.63 08/22/2023       _____________________________________________________  PHYSICAL EXAMINATION:  General: well developed and well nourished, alert, oriented times 3, and appears comfortable  Psychiatric: Normal  HEENT: Normocephalic, Atraumatic Trachea Midline, No torticollis  Pulmonary: No audible wheezing or respiratory distress   Cardiovascular: No pitting edema, 2+ radial pulse   Abdominal/GI: abdomen non tender, non distended   Skin: No masses, erythema, lacerations, fluctation, ulcerations  Neurovascular: Sensation Intact to the Median, Ulnar, Radial Nerve, Motor Intact to the Median, Ulnar, Radial Nerve, and Pulses Intact  Musculoskeletal: Normal, except as noted in detailed exam and in HPI.      MUSCULOSKELETAL EXAMINATION:  Right hand  TTP A1 pulley long finger   + click long finger  No ulnar nerve subluxation    Left hand  TTP index finger  + click index finger    ___________________________________________________  STUDIES REVIEWED:  I have personally reviewed AP lateral and oblique radiographs of [facility] US BUE   which demonstrate carpal tunnel ruled in on the right and suspicious on the left. Suspicion for cubital tunnel bilaterally.           PROCEDURES PERFORMED:  Procedures  No Procedures performed  today    _____________________________________________________      Scribe Attestation      I,:  Val Mendoza MA am acting as a scribe while in the presence of the attending physician.:       I,:  Sergei Villanueva MD personally performed the services described in this documentation    as scribed in my presence.:

## 2024-02-01 ENCOUNTER — OFFICE VISIT (OUTPATIENT)
Dept: FAMILY MEDICINE CLINIC | Facility: CLINIC | Age: 69
End: 2024-02-01

## 2024-02-01 ENCOUNTER — TELEPHONE (OUTPATIENT)
Dept: FAMILY MEDICINE CLINIC | Facility: CLINIC | Age: 69
End: 2024-02-01

## 2024-02-01 VITALS
BODY MASS INDEX: 39.65 KG/M2 | HEIGHT: 61 IN | DIASTOLIC BLOOD PRESSURE: 64 MMHG | HEART RATE: 69 BPM | SYSTOLIC BLOOD PRESSURE: 106 MMHG | OXYGEN SATURATION: 98 % | TEMPERATURE: 97.8 F | WEIGHT: 210 LBS

## 2024-02-01 DIAGNOSIS — R07.89 OTHER CHEST PAIN: Primary | ICD-10-CM

## 2024-02-01 DIAGNOSIS — H53.8 BLURRY VISION: ICD-10-CM

## 2024-02-01 DIAGNOSIS — E78.2 MIXED HYPERLIPIDEMIA: ICD-10-CM

## 2024-02-01 DIAGNOSIS — R73.03 PREDIABETES: ICD-10-CM

## 2024-02-01 PROBLEM — R07.2 SUBSTERNAL CHEST PAIN: Status: ACTIVE | Noted: 2024-02-01

## 2024-02-01 PROCEDURE — 99213 OFFICE O/P EST LOW 20 MIN: CPT | Performed by: FAMILY MEDICINE

## 2024-02-01 NOTE — ASSESSMENT & PLAN NOTE
- Patient with a history of prediabetes.  Previous A1c 6.1.    - Discussed healthy lifestyle and exercise with patient.  Patient previously referred to nutritionist.  Encourage patient to contact nutritionist to set up appointment.    - Will repeat A1c

## 2024-02-01 NOTE — ASSESSMENT & PLAN NOTE
"  Lab Results   Component Value Date    CHOLESTEROL 295 (H) 08/02/2023    TRIG 176 (H) 08/02/2023    HDL 82 08/02/2023    LDLCALC 178 (H) 08/02/2023     - Uncontrolled on no medication  - Patient previously on pravastatin but discontinued as she felt \"the medication was making her cataracts worse\".  - Patient with a ASCVD risk of 7.5.;  Stressed the importance of patient taking cholesterol medication however the patient defers medication at this time  "

## 2024-02-01 NOTE — PROGRESS NOTES
Name: Henna Ballard      : 1955      MRN: [id number]  Encounter Provider: Taylor Lozano MD  Encounter Date: 2024   [facility]    Assessment & Plan     1. Other chest pain  Assessment & Plan:  - Patient reports a history of intermittent stabbing/crushing substernal chest pain that radiates to both her jaw and down her arm.  Patient denies any shortness of breath, lightheadedness, dizziness or leg swelling  - Previous patient reports that she was told that this is likely due to acid reflux.  Patient had EGD in 2022 which showed a normal esophagus, duodenal bulb, 1st part of the duodenum and 2nd part of the duodenum.  Patient was previously told that it was gas but she states that there are no gas symptoms.  Per patient symptoms do not change with food or eating.  - Patient denies any chest pain on exam today and reports her last episode was this past .  Patient states that chest pain is sometimes aggravated by activity  -Will order stress test and EKG; as symptoms may mimic atypical angina and patient reports that chest pain is worsening and becoming more frequent.  Patient also has risk factors such as uncontrolled hyperlipidemia and elevated blood sugar  -Strict ER precautions discussed with patient.  Patient to go to emergency room if the symptoms were to return or recur   -Lipid panel, A1c ordered.  Patient already has other blood work pending.    Orders:  -     NM myocardial perfusion spect (stress and/or rest); Future; Expected date: 2024  -     ECG 12 lead; Future    2. Prediabetes  Assessment & Plan:  - Patient with a history of prediabetes.  Previous A1c 6.1.    - Discussed healthy lifestyle and exercise with patient.  Patient previously referred to nutritionist.  Encourage patient to contact nutritionist to set up appointment.    - Will repeat A1c    Orders:  -     Hemoglobin A1C; Future    3. Mixed  "hyperlipidemia  Assessment & Plan:    Lab Results   Component Value Date    CHOLESTEROL 295 (H) 08/02/2023    TRIG 176 (H) 08/02/2023    HDL 82 08/02/2023    LDLCALC 178 (H) 08/02/2023     - Uncontrolled on no medication  - Patient previously on pravastatin but discontinued as she felt \"the medication was making her cataracts worse\".  - Patient with a ASCVD risk of 7.5.;  Stressed the importance of patient taking cholesterol medication however the patient defers medication at this time    Orders:  -     Lipid panel; Future    4. Blurry vision  Assessment & Plan:  - Worsening blurriness over the year and a half previously sent referral for ophthalmologist however patient did not call; patient reports history of cataracts however none noted in chart  -On exam EOM intact and pupils round reactive to light, patient denies any pain with eye movement/photophobia  -Reminded patient to set up ophthalmology for             Subjective      Patient here for follow-up on hyperlipidemia.  Patient reports she has not taking any of her pravastatin as she thinks this \"makes her is more blurry\".  Patient is reporting substernal chest pain that has been worsening over the last few years that is now more frequent and radiates to the shoulders and neck.  Currently today patient denies any chest pain and has not had chest pain since Sunday.  Patient states that previously she was told that this was due to.  However patient had EGD that was mostly normal.  Patient reports that she was also told dnies that this was due to gas but patient has been taking things to help with the gas that has not resolved symptoms.     Review of Systems   Constitutional:  Negative for chills and fever.   HENT:  Negative for ear pain and sore throat.    Eyes:  Negative for pain and visual disturbance.   Respiratory:  Positive for chest tightness. Negative for cough, shortness of breath and wheezing.    Cardiovascular:  Negative for chest pain and " "palpitations.   Gastrointestinal:  Negative for abdominal pain and vomiting.   Endocrine: Negative for heat intolerance and polyuria.   Genitourinary:  Negative for dysuria and hematuria.   Musculoskeletal:  Negative for arthralgias and back pain.   Skin:  Negative for color change and rash.   Neurological:  Negative for seizures and syncope.   All other systems reviewed and are negative.      Current Outpatient Medications on File Prior to Visit   Medication Sig    pravastatin (PRAVACHOL) 20 mg tablet Take 1 tablet (20 mg total) by mouth daily at bedtime    amoxicillin (AMOXIL) 500 MG tablet Take 500 mg by mouth every 8 (eight) hours (Patient not taking: Reported on 2/1/2024)    carbamide peroxide (DEBROX) 6.5 % otic solution Administer 5 drops into both ears in the morning and 5 drops in the evening. (Patient not taking: Reported on 5/25/2022)    chlorhexidine (PERIDEX) 0.12 % solution RINSE IN MOUTH TWICE DAILY AS DIRECTED (Patient not taking: Reported on 9/25/2023)    meloxicam (Mobic) 7.5 mg tablet Take 1 tablet (7.5 mg total) by mouth daily (Patient not taking: Reported on 2/1/2024)       Objective     /64 (BP Location: Right arm, Patient Position: Sitting, Cuff Size: Standard)   Pulse 69   Temp 97.8 °F (36.6 °C) (Temporal)   Ht 5' 1\" (1.549 m)   Wt 95.3 kg (210 lb)   SpO2 98%   BMI 39.68 kg/m²     Physical Exam  Constitutional:       General: She is awake.      Appearance: Normal appearance.   HENT:      Head: Normocephalic.      Jaw: There is normal jaw occlusion.   Eyes:      General: Lids are normal.   Cardiovascular:      Rate and Rhythm: Normal rate and regular rhythm.      Heart sounds: Normal heart sounds, S1 normal and S2 normal.   Pulmonary:      Effort: Pulmonary effort is normal. No tachypnea or bradypnea.      Breath sounds: Normal breath sounds and air entry.   Abdominal:      General: Bowel sounds are normal.      Palpations: Abdomen is soft.      Tenderness: There is no abdominal " tenderness.   Skin:     General: Skin is warm.   Neurological:      Mental Status: She is alert and oriented to person, place, and time.   Psychiatric:         Attention and Perception: Attention normal.         Behavior: Behavior is cooperative.       Taylor Lozano MD

## 2024-02-01 NOTE — ASSESSMENT & PLAN NOTE
- Worsening blurriness over the year and a half previously sent referral for ophthalmologist however patient did not call; patient reports history of cataracts however none noted in chart  -On exam EOM intact and pupils round reactive to light, patient denies any pain with eye movement/photophobia  -Reminded patient to set up ophthalmology for

## 2024-02-01 NOTE — ASSESSMENT & PLAN NOTE
- Patient reports a history of intermittent stabbing/crushing substernal chest pain that radiates to both her jaw and down her arm.  Patient denies any shortness of breath, lightheadedness, dizziness or leg swelling  - Previous patient reports that she was told that this is likely due to acid reflux.  Patient had EGD in 08/2022 which showed a normal esophagus, duodenal bulb, 1st part of the duodenum and 2nd part of the duodenum.  Patient was previously told that it was gas but she states that there are no gas symptoms.  Per patient symptoms do not change with food or eating.  - Patient denies any chest pain on exam today and reports her last episode was this past Sunday.  Patient states that chest pain is sometimes aggravated by activity  -Will order stress test and EKG; as symptoms may mimic atypical angina and patient reports that chest pain is worsening and becoming more frequent.  Patient also has risk factors such as uncontrolled hyperlipidemia and elevated blood sugar  -Strict ER precautions discussed with patient.  Patient to go to emergency room if the symptoms were to return or recur   -Lipid panel, A1c ordered.  Patient already has other blood work pending.

## 2024-02-08 ENCOUNTER — APPOINTMENT (OUTPATIENT)
Dept: LAB | Age: 69
End: 2024-02-08
Payer: COMMERCIAL

## 2024-02-08 DIAGNOSIS — E78.2 MIXED HYPERLIPIDEMIA: ICD-10-CM

## 2024-02-08 DIAGNOSIS — R73.03 PREDIABETES: ICD-10-CM

## 2024-02-08 LAB
BASOPHILS # BLD AUTO: 0.03 THOUSANDS/ÂΜL (ref 0–0.1)
BASOPHILS NFR BLD AUTO: 0 % (ref 0–1)
CHOLEST SERPL-MCNC: 233 MG/DL
EOSINOPHIL # BLD AUTO: 0.16 THOUSAND/ÂΜL (ref 0–0.61)
EOSINOPHIL NFR BLD AUTO: 2 % (ref 0–6)
ERYTHROCYTE [DISTWIDTH] IN BLOOD BY AUTOMATED COUNT: 13.3 % (ref 11.6–15.1)
EST. AVERAGE GLUCOSE BLD GHB EST-MCNC: 128 MG/DL
FERRITIN SERPL-MCNC: 113 NG/ML (ref 11–307)
HBA1C MFR BLD: 6.1 %
HCT VFR BLD AUTO: 39.8 % (ref 34.8–46.1)
HDLC SERPL-MCNC: 90 MG/DL
HGB BLD-MCNC: 13.2 G/DL (ref 11.5–15.4)
IMM GRANULOCYTES # BLD AUTO: 0.01 THOUSAND/UL (ref 0–0.2)
IMM GRANULOCYTES NFR BLD AUTO: 0 % (ref 0–2)
IRON SATN MFR SERPL: 28 % (ref 15–50)
IRON SERPL-MCNC: 99 UG/DL (ref 50–212)
LDLC SERPL CALC-MCNC: 122 MG/DL (ref 0–100)
LYMPHOCYTES # BLD AUTO: 3.21 THOUSANDS/ÂΜL (ref 0.6–4.47)
LYMPHOCYTES NFR BLD AUTO: 44 % (ref 14–44)
MAGNESIUM SERPL-MCNC: 2 MG/DL (ref 1.9–2.7)
MCH RBC QN AUTO: 29.6 PG (ref 26.8–34.3)
MCHC RBC AUTO-ENTMCNC: 33.2 G/DL (ref 31.4–37.4)
MCV RBC AUTO: 89 FL (ref 82–98)
MONOCYTES # BLD AUTO: 0.4 THOUSAND/ÂΜL (ref 0.17–1.22)
MONOCYTES NFR BLD AUTO: 6 % (ref 4–12)
NEUTROPHILS # BLD AUTO: 3.44 THOUSANDS/ÂΜL (ref 1.85–7.62)
NEUTS SEG NFR BLD AUTO: 48 % (ref 43–75)
NONHDLC SERPL-MCNC: 143 MG/DL
NRBC BLD AUTO-RTO: 0 /100 WBCS
PLATELET # BLD AUTO: 220 THOUSANDS/UL (ref 149–390)
PMV BLD AUTO: 11.1 FL (ref 8.9–12.7)
RBC # BLD AUTO: 4.46 MILLION/UL (ref 3.81–5.12)
T3 SERPL-MCNC: 0.9 NG/ML
T3FREE SERPL-MCNC: 3.65 PG/ML (ref 2.5–3.9)
T4 FREE SERPL-MCNC: 0.74 NG/DL (ref 0.61–1.12)
TIBC SERPL-MCNC: 352 UG/DL (ref 250–450)
TRIGL SERPL-MCNC: 105 MG/DL
TSH SERPL DL<=0.05 MIU/L-ACNC: 0.32 UIU/ML (ref 0.45–4.5)
UIBC SERPL-MCNC: 253 UG/DL (ref 155–355)
WBC # BLD AUTO: 7.25 THOUSAND/UL (ref 4.31–10.16)

## 2024-02-08 PROCEDURE — 84439 ASSAY OF FREE THYROXINE: CPT

## 2024-02-08 PROCEDURE — 85025 COMPLETE CBC W/AUTO DIFF WBC: CPT

## 2024-02-08 PROCEDURE — 80061 LIPID PANEL: CPT

## 2024-02-08 PROCEDURE — 83036 HEMOGLOBIN GLYCOSYLATED A1C: CPT

## 2024-02-08 PROCEDURE — 82728 ASSAY OF FERRITIN: CPT

## 2024-02-08 PROCEDURE — 84480 ASSAY TRIIODOTHYRONINE (T3): CPT

## 2024-02-08 PROCEDURE — 84481 FREE ASSAY (FT-3): CPT

## 2024-02-08 PROCEDURE — 83550 IRON BINDING TEST: CPT

## 2024-02-08 PROCEDURE — 83540 ASSAY OF IRON: CPT

## 2024-02-08 PROCEDURE — 36415 COLL VENOUS BLD VENIPUNCTURE: CPT

## 2024-02-08 PROCEDURE — 84443 ASSAY THYROID STIM HORMONE: CPT

## 2024-02-08 PROCEDURE — 83735 ASSAY OF MAGNESIUM: CPT

## 2024-02-20 ENCOUNTER — HOSPITAL ENCOUNTER (OUTPATIENT)
Dept: NON INVASIVE DIAGNOSTICS | Facility: CLINIC | Age: 69
Discharge: HOME | End: 2024-02-20
Payer: COMMERCIAL

## 2024-02-20 VITALS — BODY MASS INDEX: 39.65 KG/M2 | WEIGHT: 210 LBS | HEIGHT: 61 IN

## 2024-02-20 DIAGNOSIS — R07.89 OTHER CHEST PAIN: ICD-10-CM

## 2024-02-20 LAB
CHEST PAIN STATEMENT: NORMAL
MAX DIASTOLIC BP: 58 MMHG
MAX PREDICTED HEART RATE: 151 BPM
NUC STRESS EJECTION FRACTION: 70 %
PROTOCOL NAME: NORMAL
RATE PRESSURE PRODUCT: NORMAL
REASON FOR TERMINATION: NORMAL
SL CV REST NUCLEAR ISOTOPE DOSE: 10.76 MCI
SL CV STRESS NUCLEAR ISOTOPE DOSE: 30.4 MCI
SL CV STRESS RECOVERY BP: NORMAL MMHG
SL CV STRESS RECOVERY HR: 85 BPM
SL CV STRESS RECOVERY O2 SAT: 97 %
STRESS ANGINA INDEX: 0
STRESS BASELINE BP: NORMAL MMHG
STRESS BASELINE HR: 74 BPM
STRESS O2 SAT REST: 98 %
STRESS PEAK HR: 109 BPM
STRESS POST EXERCISE DUR MIN: 1 MIN
STRESS POST EXERCISE DUR SEC: 26 SEC
STRESS POST O2 SAT PEAK: 99 %
STRESS POST PEAK BP: 164 MMHG
STRESS POST PEAK HR: 116 BPM
STRESS POST PEAK SYSTOLIC BP: 164 MMHG
STRESS/REST PERFUSION RATIO: 0.74
TARGET HR FORMULA: NORMAL
TEST INDICATION: NORMAL

## 2024-02-20 PROCEDURE — A9502 TC99M TETROFOSMIN: HCPCS

## 2024-02-20 PROCEDURE — 93018 CV STRESS TEST I&R ONLY: CPT | Performed by: INTERNAL MEDICINE

## 2024-02-20 PROCEDURE — G1004 CDSM NDSC: HCPCS

## 2024-02-20 PROCEDURE — 93016 CV STRESS TEST SUPVJ ONLY: CPT | Performed by: INTERNAL MEDICINE

## 2024-02-20 PROCEDURE — 93017 CV STRESS TEST TRACING ONLY: CPT

## 2024-02-20 PROCEDURE — 78452 HT MUSCLE IMAGE SPECT MULT: CPT

## 2024-02-20 PROCEDURE — 78452 HT MUSCLE IMAGE SPECT MULT: CPT | Performed by: INTERNAL MEDICINE

## 2024-02-20 RX ORDER — REGADENOSON 0.08 MG/ML
0.4 INJECTION, SOLUTION INTRAVENOUS ONCE
Status: COMPLETED | OUTPATIENT
Start: 2024-02-20 | End: 2024-02-20

## 2024-02-20 RX ADMIN — REGADENOSON 0.4 MG: 0.08 INJECTION, SOLUTION INTRAVENOUS at 10:14

## 2024-02-23 ENCOUNTER — OFFICE VISIT (OUTPATIENT)
Dept: FAMILY MEDICINE CLINIC | Facility: CLINIC | Age: 69
End: 2024-02-23

## 2024-02-23 VITALS
DIASTOLIC BLOOD PRESSURE: 66 MMHG | RESPIRATION RATE: 19 BRPM | HEIGHT: 61 IN | OXYGEN SATURATION: 96 % | HEART RATE: 72 BPM | SYSTOLIC BLOOD PRESSURE: 113 MMHG | WEIGHT: 212 LBS | TEMPERATURE: 98.7 F | BODY MASS INDEX: 40.02 KG/M2

## 2024-02-23 DIAGNOSIS — R07.89 OTHER CHEST PAIN: Primary | ICD-10-CM

## 2024-02-23 DIAGNOSIS — H53.8 BLURRY VISION: ICD-10-CM

## 2024-02-23 DIAGNOSIS — E78.2 MIXED HYPERLIPIDEMIA: ICD-10-CM

## 2024-02-23 PROCEDURE — 99213 OFFICE O/P EST LOW 20 MIN: CPT | Performed by: FAMILY MEDICINE

## 2024-02-23 RX ORDER — PRAVASTATIN SODIUM 20 MG
20 TABLET ORAL
Qty: 90 TABLET | Refills: 2 | Status: SHIPPED | OUTPATIENT
Start: 2024-02-23

## 2024-02-23 NOTE — ASSESSMENT & PLAN NOTE
Patient is concerned of Hba1c being in the pre-diabetic range (6.1). Encouraged healthy diet well-balanced diet, low in carbs and increase exercise as tolerated.    Plan:  - referral to nutrition services

## 2024-02-23 NOTE — ASSESSMENT & PLAN NOTE
Lab Results   Component Value Date    CHOLESTEROL 233 (H) 02/08/2024    TRIG 105 02/08/2024    HDL 90 02/08/2024    LDLCALC 122 (H) 02/08/2024     Patient lipid labs were reviewed. Patient labs are trending downward. Counseled patient on low cholesterol diet and exercise. Patient currently taking pravastatin 20 mg  at bedtime.  CMP previously normal.    Plan:  - continue current medications  - referral to nutrition services

## 2024-02-23 NOTE — ASSESSMENT & PLAN NOTE
- Patient here for follow-up of intermittent substernal chest pain. Patient reports that chest pain has completely subsided. Patient was referred for exercise stress test which she was unable to complete due to knee pain and dyspnea (likely due to having a cold during this time), patient was able to complete pharmacological stress test with no angina during test.    Plan:  - follow up if she begins to have any recurring symptoms  -  Perfusion scan showed no ischemia.

## 2024-02-23 NOTE — PROGRESS NOTES
Name: Henna Ballard      : 1955      MRN: [id number]  Encounter Provider: Taylor Lozano MD  Encounter Date: 2024   Encounter department: [facility]    Assessment & Plan     1. Other chest pain  Assessment & Plan:  - Patient here for follow-up of intermittent substernal chest pain. Patient reports that chest pain has completely subsided. Patient was referred for exercise stress test which she was unable to complete due to knee pain and dyspnea (likely due to having a cold during this time), patient was able to complete pharmacological stress test with no angina during test. Perfusion scan showed no ischemia.     Plan:  - follow up if she begins to have any recurring symptoms        2. Mixed hyperlipidemia  Assessment & Plan:    Lab Results   Component Value Date    CHOLESTEROL 233 (H) 2024    TRIG 105 2024    HDL 90 2024    LDLCALC 122 (H) 2024     Patient lipid labs were reviewed. Patient labs are trending downward. Counseled patient on low cholesterol diet and exercise. Patient currently taking pravastatin 20 mg  at bedtime.  CMP previously normal.    Plan:  - continue current medications  - referral to nutrition services      Orders:  -     pravastatin (PRAVACHOL) 20 mg tablet; Take 1 tablet (20 mg total) by mouth daily at bedtime    3. BMI 38.0-38.9,adult  Assessment & Plan:  Patient is concerned of Hba1c being in the pre-diabetic range (6.1). Encouraged healthy diet well-balanced diet, low in carbs and increase exercise as tolerated.    Plan:  - referral to nutrition services      Orders:  -     Ambulatory Referral to Nutrition Services; Future    4. Blurry vision  Assessment & Plan:  - Worsening blurriness over the year and a half previously sent referral for ophthalmologist however patient did not call; patient reports history of cataracts however none noted in chart  -On exam EOM intact and pupils round reactive to light,  patient denies any pain with eye movement/photophobia  -Reminded patient to set up w/ ophthalmology             Subjective      Patient here for chest pain follow-up.  Per patient chest pain has completely resolved.  Pt denies any episodes of chest pain radiating to her jaw or down the arm. Patient notes blurry vision at times, for which she is supposed to make a follow-up with ophthalmology. Patient has no other complaints at this time. Pt would like to review blood work and cardiac workup today.    Review of Systems   Constitutional:  Negative for fatigue.   HENT:  Negative for congestion.    Eyes:  Positive for visual disturbance (Intermittent blurriness over the last year).   Respiratory:  Negative for shortness of breath.    Cardiovascular:  Negative for chest pain.   Gastrointestinal:  Negative for abdominal distention, constipation and diarrhea.   Genitourinary:  Negative for dysuria.   Musculoskeletal:  Negative for gait problem.   Skin:  Negative for color change.   Neurological:  Negative for weakness and headaches.        Leg pain from sciatica   Psychiatric/Behavioral:  Negative for agitation.        Current Outpatient Medications on File Prior to Visit   Medication Sig   • [DISCONTINUED] pravastatin (PRAVACHOL) 20 mg tablet Take 1 tablet (20 mg total) by mouth daily at bedtime   • amoxicillin (AMOXIL) 500 MG tablet Take 500 mg by mouth every 8 (eight) hours (Patient not taking: Reported on 2/1/2024)   • carbamide peroxide (DEBROX) 6.5 % otic solution Administer 5 drops into both ears in the morning and 5 drops in the evening. (Patient not taking: Reported on 5/25/2022)   • chlorhexidine (PERIDEX) 0.12 % solution RINSE IN MOUTH TWICE DAILY AS DIRECTED (Patient not taking: Reported on 9/25/2023)   • meloxicam (Mobic) 7.5 mg tablet Take 1 tablet (7.5 mg total) by mouth daily (Patient not taking: Reported on 2/1/2024)       Objective     /66 (BP Location: Left arm, Patient Position: Sitting, Cuff  "Size: Standard)   Pulse 72   Temp 98.7 °F (37.1 °C) (Temporal)   Resp 19   Ht 5' 1\" (1.549 m)   Wt 96.2 kg (212 lb)   SpO2 96%   BMI 40.06 kg/m²     Physical Exam  Constitutional:       Appearance: Normal appearance.   HENT:      Head: Normocephalic.      Right Ear: External ear normal.      Left Ear: External ear normal.      Mouth/Throat:      Mouth: Mucous membranes are moist.   Eyes:      Pupils: Pupils are equal, round, and reactive to light.   Cardiovascular:      Rate and Rhythm: Normal rate and regular rhythm.      Pulses: Normal pulses.      Heart sounds: Normal heart sounds.   Pulmonary:      Effort: Pulmonary effort is normal.      Breath sounds: Normal breath sounds.   Abdominal:      General: Abdomen is flat.      Palpations: Abdomen is soft.   Musculoskeletal:      Cervical back: Normal range of motion.   Skin:     General: Skin is warm.   Neurological:      General: No focal deficit present.      Mental Status: She is alert.   Psychiatric:         Mood and Affect: Mood normal.         Behavior: Behavior normal.     Taylor Lozano MD    "

## 2024-02-23 NOTE — ASSESSMENT & PLAN NOTE
- Worsening blurriness over the year and a half previously sent referral for ophthalmologist however patient did not call; patient reports history of cataracts however none noted in chart  -On exam EOM intact and pupils round reactive to light, patient denies any pain with eye movement/photophobia  -Reminded patient to set up w/ ophthalmology

## 2024-03-13 ENCOUNTER — TELEPHONE (OUTPATIENT)
Dept: SLEEP CENTER | Facility: CLINIC | Age: 69
End: 2024-03-13

## 2024-03-13 NOTE — TELEPHONE ENCOUNTER
Via  #[id number] Advised blood work results and further advised patient to follow up with her PCP about thyroid results

## 2024-03-13 NOTE — TELEPHONE ENCOUNTER
----- Message from LEAH Leary sent at 2/14/2024  7:47 AM EST -----  Ferritin level is >100 and not contributing to restless leg symptoms.  CBC is normal.  Magnesium level is low normal.  Could try over the counter magnesium bisglycinate at dinner time meal.    Follow up with PCP regarding thyroid testing.

## 2024-04-17 ENCOUNTER — APPOINTMENT (OUTPATIENT)
Dept: LAB | Facility: CLINIC | Age: 69
End: 2024-04-17
Payer: COMMERCIAL

## 2024-04-17 DIAGNOSIS — G56.21 CUBITAL TUNNEL SYNDROME ON RIGHT: ICD-10-CM

## 2024-04-17 DIAGNOSIS — R07.89 OTHER CHEST PAIN: ICD-10-CM

## 2024-04-17 DIAGNOSIS — G56.01 CARPAL TUNNEL SYNDROME ON RIGHT: ICD-10-CM

## 2024-04-17 DIAGNOSIS — M65.331 TRIGGER FINGER, RIGHT MIDDLE FINGER: ICD-10-CM

## 2024-04-17 LAB
ATRIAL RATE: 67 BPM
ERYTHROCYTE [DISTWIDTH] IN BLOOD BY AUTOMATED COUNT: 14.1 % (ref 11.6–15.1)
HCT VFR BLD AUTO: 41.3 % (ref 34.8–46.1)
HGB BLD-MCNC: 13.2 G/DL (ref 11.5–15.4)
MCH RBC QN AUTO: 28.9 PG (ref 26.8–34.3)
MCHC RBC AUTO-ENTMCNC: 32 G/DL (ref 31.4–37.4)
MCV RBC AUTO: 90 FL (ref 82–98)
P AXIS: 21 DEGREES
PLATELET # BLD AUTO: 232 THOUSANDS/UL (ref 149–390)
PMV BLD AUTO: 10.7 FL (ref 8.9–12.7)
PR INTERVAL: 144 MS
QRS AXIS: -9 DEGREES
QRSD INTERVAL: 74 MS
QT INTERVAL: 410 MS
QTC INTERVAL: 433 MS
RBC # BLD AUTO: 4.57 MILLION/UL (ref 3.81–5.12)
T WAVE AXIS: 31 DEGREES
VENTRICULAR RATE: 67 BPM
WBC # BLD AUTO: 8.83 THOUSAND/UL (ref 4.31–10.16)

## 2024-04-17 PROCEDURE — 85027 COMPLETE CBC AUTOMATED: CPT

## 2024-04-17 PROCEDURE — 93010 ELECTROCARDIOGRAM REPORT: CPT | Performed by: INTERNAL MEDICINE

## 2024-04-17 PROCEDURE — 36415 COLL VENOUS BLD VENIPUNCTURE: CPT

## 2024-04-17 PROCEDURE — 93005 ELECTROCARDIOGRAM TRACING: CPT

## 2024-04-18 ENCOUNTER — TELEPHONE (OUTPATIENT)
Age: 69
End: 2024-04-18

## 2024-04-18 NOTE — TELEPHONE ENCOUNTER
Caller: Patient     Doctor: Rich     Reason for call: Patient would like to postpone surgery. She believes she has shingles. She will call back to reschedule when she is all healed     Call back#: [phone]

## 2024-07-09 ENCOUNTER — OFFICE VISIT (OUTPATIENT)
Dept: FAMILY MEDICINE CLINIC | Facility: CLINIC | Age: 69
End: 2024-07-09

## 2024-07-09 VITALS
BODY MASS INDEX: 40.02 KG/M2 | RESPIRATION RATE: 18 BRPM | HEART RATE: 75 BPM | HEIGHT: 61 IN | OXYGEN SATURATION: 97 % | DIASTOLIC BLOOD PRESSURE: 66 MMHG | SYSTOLIC BLOOD PRESSURE: 106 MMHG | WEIGHT: 212 LBS | TEMPERATURE: 98 F

## 2024-07-09 DIAGNOSIS — M54.50 CHRONIC RIGHT-SIDED LOW BACK PAIN WITHOUT SCIATICA: ICD-10-CM

## 2024-07-09 DIAGNOSIS — G89.29 CHRONIC RIGHT-SIDED LOW BACK PAIN WITHOUT SCIATICA: ICD-10-CM

## 2024-07-09 DIAGNOSIS — E78.2 MIXED HYPERLIPIDEMIA: ICD-10-CM

## 2024-07-09 DIAGNOSIS — Z23 ENCOUNTER FOR IMMUNIZATION: ICD-10-CM

## 2024-07-09 DIAGNOSIS — E66.01 CLASS 2 SEVERE OBESITY WITH SERIOUS COMORBIDITY AND BODY MASS INDEX (BMI) OF 39.0 TO 39.9 IN ADULT, UNSPECIFIED OBESITY TYPE (HCC): ICD-10-CM

## 2024-07-09 DIAGNOSIS — G89.29 ACUTE EXACERBATION OF CHRONIC LOW BACK PAIN: Primary | ICD-10-CM

## 2024-07-09 DIAGNOSIS — M54.50 ACUTE EXACERBATION OF CHRONIC LOW BACK PAIN: Primary | ICD-10-CM

## 2024-07-09 PROBLEM — R53.82 CHRONIC FATIGUE: Status: RESOLVED | Noted: 2022-05-16 | Resolved: 2024-07-09

## 2024-07-09 PROBLEM — G47.19 EXCESSIVE DAYTIME SLEEPINESS: Status: RESOLVED | Noted: 2022-12-27 | Resolved: 2024-07-09

## 2024-07-09 PROBLEM — R30.0 DYSURIA: Status: RESOLVED | Noted: 2023-03-29 | Resolved: 2024-07-09

## 2024-07-09 PROBLEM — Z00.00 ANNUAL PHYSICAL EXAM: Status: RESOLVED | Noted: 2023-07-10 | Resolved: 2024-07-09

## 2024-07-09 PROBLEM — R53.83 OTHER FATIGUE: Status: RESOLVED | Noted: 2023-08-07 | Resolved: 2024-07-09

## 2024-07-09 PROBLEM — R10.2 VAGINAL PAIN: Status: RESOLVED | Noted: 2023-03-29 | Resolved: 2024-07-09

## 2024-07-09 PROCEDURE — 90471 IMMUNIZATION ADMIN: CPT

## 2024-07-09 PROCEDURE — 90750 HZV VACC RECOMBINANT IM: CPT

## 2024-07-09 PROCEDURE — 99204 OFFICE O/P NEW MOD 45 MIN: CPT | Performed by: FAMILY MEDICINE

## 2024-07-09 PROCEDURE — 96372 THER/PROPH/DIAG INJ SC/IM: CPT

## 2024-07-09 RX ORDER — PRAVASTATIN SODIUM 20 MG
20 TABLET ORAL
Qty: 90 TABLET | Refills: 2 | Status: SHIPPED | OUTPATIENT
Start: 2024-07-09

## 2024-07-09 RX ORDER — KETOROLAC TROMETHAMINE 30 MG/ML
30 INJECTION, SOLUTION INTRAMUSCULAR; INTRAVENOUS ONCE
Status: COMPLETED | OUTPATIENT
Start: 2024-07-09 | End: 2024-07-09

## 2024-07-09 RX ORDER — SEMAGLUTIDE 0.25 MG/.5ML
INJECTION, SOLUTION SUBCUTANEOUS
Qty: 2 ML | Refills: 5 | Status: SHIPPED | OUTPATIENT
Start: 2024-07-09 | End: 2024-07-09

## 2024-07-09 RX ORDER — MELOXICAM 7.5 MG/1
7.5 TABLET ORAL DAILY
Qty: 30 TABLET | Refills: 1 | Status: SHIPPED | OUTPATIENT
Start: 2024-07-09

## 2024-07-09 RX ADMIN — KETOROLAC TROMETHAMINE 30 MG: 30 INJECTION, SOLUTION INTRAMUSCULAR; INTRAVENOUS at 14:08

## 2024-07-09 NOTE — ASSESSMENT & PLAN NOTE
Acute on chronic lower back pain  History of chronic right-sided lower back pain with sciatica-right-sided  Current back pain is left-sided radiating to the left buttocks  Injection Toradol 30 mg IM given in office today  Advised ice, heat, stretches, massage  Refilled meloxicam, to be taken with food

## 2024-07-09 NOTE — PROGRESS NOTES
Ambulatory Visit  Name: Henna Ballard      : 1955      MRN: [id number]  Encounter Provider: Pina Martins MD  Encounter Date: 2024   Encounter department: [facility]    Assessment & Plan   1. Acute exacerbation of chronic low back pain  Assessment & Plan:  Acute on chronic lower back pain  History of chronic right-sided lower back pain with sciatica-right-sided  Current back pain is left-sided radiating to the left buttocks  Injection Toradol 30 mg IM given in office today  Advised ice, heat, stretches, massage  Refilled meloxicam, to be taken with food  Orders:  -     ketorolac (TORADOL) injection 30 mg  -     meloxicam (Mobic) 7.5 mg tablet; Take 1 tablet (7.5 mg total) by mouth daily  2. Chronic right-sided low back pain without sciatica  -     Ambulatory Referral to Orthopedic Surgery; Future  3. Class 2 severe obesity with serious comorbidity and body mass index (BMI) of 39.0 to 39.9 in adult, unspecified obesity type (HCC)  Assessment & Plan:  Body mass index is 40.06 kg/m².  Patient interested in weight loss medication after greater than 6 months of dietary and lifestyle changes  Patient unable to lose weight.  Will start Wegovy 0.25 mg weekly and titrate up as tolerated  Discussed risks benefits and side effects of medication with the patient  Referred to nutritionist and medical fitness therapy  Orders:  -     Semaglutide-Weight Management (WEGOVY) 0.25 MG/0.5ML; Inject 0.5 mL (0.25 mg total) under the skin once a week for 28 days  -     Semaglutide-Weight Management (WEGOVY) 0.5 MG/0.5ML; Inject 0.5 mL (0.5 mg total) under the skin once a week for 28 days Do not start before 2024.  -     Semaglutide-Weight Management (WEGOVY) 1 MG/0.5ML; Inject 0.5 mL (1 mg total) under the skin once a week for 28 days Do not start before 2024.  -     Semaglutide-Weight Management (WEGOVY) 1.7 MG/0.75ML; Inject 0.75 mL (1.7 mg total) under the  skin once a week for 28 days Do not start before September 29, 2024.  -     Semaglutide-Weight Management (WEGOVY) 2.4 MG/0.75ML; Inject 0.75 mL (2.4 mg total) under the skin once a week for 28 days Do not start before October 27, 2024.  -     Ambulatory referral to Nutrition Services; Future  -     Ambulatory Referral to Medical Fitness Exercise Specialist; Future  4. Mixed hyperlipidemia  -     pravastatin (PRAVACHOL) 20 mg tablet; Take 1 tablet (20 mg total) by mouth daily at bedtime  5. Encounter for immunization  -     Zoster Vaccine Recombinant IM    Patient requesting a disability placard, but reviewed the indications with her as patient does not qualify at this time.       History of Present Illness     HPI  Henna Ballard is a 68 y.o. female  who presented to the office today to establish care.  Pt has recently moved from [facility] to [facility].  Patient currently resides with her  and her mother.  She just celebrated her mother's birthday yesterday she turned 101.  Pt states that Sunday evening, two nights ago, she started to have pain in her left lower back radiating to her left hip and left groin area.  She does have a h/o of chronic lower back pain, but does not recall any aggravating incident, no fall or injury.  She is using a cane to help her ambulate today.  Patient states that she has mostly a sedentary lifestyle due to her chronic lower back pain, she has gained a significant amount of weight over the past few years.  She has tried dietary changes and lifestyle changes but unable to lose weight.  She is interested in the weight loss medication to assist with weight loss.      The following portions of the patient's history were reviewed and updated as appropriate: allergies, current medications, past family history, past medical history, past social history, past surgical history and problem list.    Review of Systems   Constitutional:  Positive for fatigue. Negative for chills and  fever.   HENT:  Negative for congestion, rhinorrhea and sore throat.    Respiratory:  Negative for cough and shortness of breath.    Cardiovascular:  Negative for chest pain.   Gastrointestinal:  Negative for diarrhea, nausea and vomiting.   Musculoskeletal:  Positive for arthralgias, back pain and gait problem.   Skin:  Negative for rash.   Neurological:  Negative for dizziness and headaches.     Past Medical History:   Diagnosis Date   • Chronic right-sided low back pain without sciatica 2022   • GERD (gastroesophageal reflux disease)    • Hyperlipidemia    • Kidney stone    • Obstructive sleep apnea-hypopnea syndrome 2022     Past Surgical History:   Procedure Laterality Date   •  SECTION      X3   • COLONOSCOPY     • HAND SURGERY Right     tendon repair     Family History   Problem Relation Age of Onset   • No Known Problems Mother         is 101 yrs old   • Cancer Father    • Stomach cancer Father         mets; unknown age- at 43   • Lung cancer Brother    • No Known Problems Son    • No Known Problems Son    • No Known Problems Daughter    • No Known Problems Maternal Grandmother    • No Known Problems Maternal Grandfather    • No Known Problems Paternal Grandmother    • No Known Problems Paternal Grandfather    • No Known Problems Maternal Aunt    • No Known Problems Maternal Aunt    • No Known Problems Maternal Aunt    • No Known Problems Maternal Aunt    • No Known Problems Maternal Aunt    • No Known Problems Maternal Aunt    • No Known Problems Maternal Aunt    • Breast cancer Neg Hx    • Colon cancer Neg Hx    • Endometrial cancer Neg Hx    • Ovarian cancer Neg Hx      Social History     Tobacco Use   • Smoking status: Never   • Smokeless tobacco: Never   Vaping Use   • Vaping status: Never Used   Substance and Sexual Activity   • Alcohol use: Never   • Drug use: Never   • Sexual activity: Yes     Partners: Male     Current Outpatient Medications on File Prior to Visit  "  Medication Sig   • Cyanocobalamin (VITAMIN B-12 PO) Take by mouth in the morning   • multivitamin (THERAGRAN) TABS Take 1 tablet by mouth daily   • [DISCONTINUED] amoxicillin (AMOXIL) 500 MG tablet Take 500 mg by mouth every 8 (eight) hours (Patient not taking: Reported on 2/1/2024)   • [DISCONTINUED] carbamide peroxide (DEBROX) 6.5 % otic solution Administer 5 drops into both ears in the morning and 5 drops in the evening. (Patient not taking: Reported on 5/25/2022)   • [DISCONTINUED] chlorhexidine (PERIDEX) 0.12 % solution RINSE IN MOUTH TWICE DAILY AS DIRECTED (Patient not taking: Reported on 9/25/2023)   • [DISCONTINUED] meloxicam (Mobic) 7.5 mg tablet Take 1 tablet (7.5 mg total) by mouth daily (Patient not taking: Reported on 2/1/2024)   • [DISCONTINUED] pravastatin (PRAVACHOL) 20 mg tablet Take 1 tablet (20 mg total) by mouth daily at bedtime (Patient not taking: Reported on 4/16/2024)     Allergies   Allergen Reactions   • Aspirin GI Intolerance     Immunization History   Administered Date(s) Administered   • Pneumococcal Conjugate Vaccine 20-valent (Pcv20), Polysace 05/16/2022   • Tdap 05/16/2022   • Zoster Vaccine Recombinant 07/09/2024     Objective     /66 (BP Location: Right arm, Patient Position: Sitting, Cuff Size: Large)   Pulse 75   Temp 98 °F (36.7 °C) (Temporal)   Resp 18   Ht 5' 1\" (1.549 m)   Wt 96.2 kg (212 lb)   SpO2 97%   BMI 40.06 kg/m²     Physical Exam  Vitals and nursing note reviewed.   Constitutional:       General: She is not in acute distress.     Appearance: She is well-developed. She is obese.   HENT:      Head: Normocephalic and atraumatic.      Right Ear: External ear normal.      Left Ear: External ear normal.      Mouth/Throat:      Mouth: Mucous membranes are moist.   Eyes:      Conjunctiva/sclera: Conjunctivae normal.   Cardiovascular:      Rate and Rhythm: Normal rate and regular rhythm.      Heart sounds: No murmur heard.  Pulmonary:      Effort: Pulmonary " effort is normal. No respiratory distress.      Breath sounds: Normal breath sounds.   Abdominal:      Palpations: Abdomen is soft.      Tenderness: There is no abdominal tenderness.   Musculoskeletal:         General: No swelling.      Cervical back: Neck supple.      Lumbar back: Spasms and tenderness present.      Comments: + walking cane   Skin:     General: Skin is warm and dry.      Capillary Refill: Capillary refill takes less than 2 seconds.   Neurological:      Mental Status: She is alert and oriented to person, place, and time.      Motor: No weakness.      Gait: Gait normal.   Psychiatric:         Mood and Affect: Mood normal.         Behavior: Behavior normal.

## 2024-07-09 NOTE — ASSESSMENT & PLAN NOTE
Body mass index is 40.06 kg/m².  Patient interested in weight loss medication after greater than 6 months of dietary and lifestyle changes  Patient unable to lose weight.  Will start Wegovy 0.25 mg weekly and titrate up as tolerated  Discussed risks benefits and side effects of medication with the patient  Referred to nutritionist and medical fitness therapy

## 2024-07-09 NOTE — PATIENT INSTRUCTIONS
Patient Education     Dieta para pérdida de peso   Acerca de charlotte melissa   Existen muchas dietas para pérdida de peso “de moda” que son muy populares en la actualidad. Muchas de estas dietas pueden resultar más peligrosas que beneficiosas. La manera más saludable para perder peso es quemar más calorías de las que consume.  Angela dieta para perder peso lo ayudará a adoptar un concepto saludable de la alimentación. NO es saludable dejar de comer para tratar de perder peso. Un buen plan de dieta lo ayudará a reducir la ingesta de comida y a dakotah decisiones saludables.  Angela meta saludable para la pérdida de peso es de 0,5 a 1 kg (1 a 2 libras) por semana. Reducir calorías en thomas dieta, quemar calorías a través del ejercicio, o ambas cosas, puede ayudarlo a perder peso. Combinar angela dieta saludable con actividad física regular puede ayudarlo a obtener los mejores resultados.  Para reducir las calorías en thomas dieta, usted puede hacer lo siguiente:  Cambiar de leche entera a leche con 1 % de grasa o leche descremada.  Cambiar el queso normal por el queso con bajo contenido de grasa o sin grasa.  Usar condimentos más saludables:  Crema agria o aderezos para ensalada con bajo contenido de grasa o sin grasa.  Mantequilla en aerosol.  Jarabes o gelatinas dietéticos en lugar de los comunes  Consumir yogurt congelado en lugar de helado flavio postre.  Evitar las renny fritas. Consumir refrigerios que consistan en zanahorias, verduras o fruta. Si es fanático de las renny, intente consumirlas horneadas y no fritas y controle el tamaño de las porciones.  Coma jamarcus a la lucie, asadas, hervidas, asadas o al horno. Evite las frituras en aceite abundante. Elija aves de anne sin piel, carne kay magra, escalante magros de carne de cerdo y pescado para obtener salinas de proteínas buenas.  Intente beber jered saborizadas sin calorías. No ramin refrescos ni jugos que tengan demasiadas calorías.  Elija fruta en lugar de dulces.  General    Puede resultar beneficioso comer comidas más pequeñas. [place] evitará que coma en exceso en thomas próxima comida. Además, comer despacio lo ayudará a llenarse más rápidamente.  Si tener 3 comidas diarias es parte de thomas estilo de harjit, elija consumir más cantidad de proteínas magras y alimentos con alto contenido de fibra en cada comida para sentirse satisfecho.  No omita comidas. Generalmente, si se saltea angela comida, tendrá más hambre en la próxima comida.  Coma porciones pequeñas. Use un plato o recipiente más pequeño para las comidas o, cuando coma fuera de thomas casa, coma la mitad y guarde la otra mitad para comer en thomas casa.  Planifique. Planifique katja comidas y la lista para hacer las compras antes de ir a la devyn. La planificación evitará que pida comida afuera.  No vaya al almacén cuando tenga hambre. Es más probable que compre refrigerios que no son buenos para usted.  Racione los refrigerios. Cuando esté comiendo un refrigerio, en lugar de consumir todo el paquete, tome angela pequeña cantidad para tener un punto dónde detenerse.  Nata agua antes y después de las comidas para llenarse sin calorías.  Cuando consuma alimentos con almidón, elija productos con granos integrales. Estos contienen mucha fibra y harán que se sienta satisfecho. La fibra también ayuda a disminuir el colesterol y ayuda con el funcionamiento de los intestinos.  Si necesita ayuda para comenzar, pídale al médico que lo derive a un dietista para que lo ayude a perder peso.         ¿Cuáles serán los resultados?   La pérdida del peso excesivo hará que todo thomas cuerpo esté más saludable. Tendrá más energía para las actividades diarias y disminuirá el riesgo de sufrir problemas de cynthia.  ¿Qué cambios en la forma de harjit se necesitan?   Manténgase físicamente activo. [place] jimmy no siempre es suficiente para perder peso. Quemar las calorías a través del ejercicio es angela parte importante de la pérdida de peso.  ¿Qué alimentos pueden comerse?   La  clave es controlar el tamaño de las porciones. Es mejor elegir alimentos que tengan un bajo contenido de grasas y calorías.  Elija melchor magras:  Pechuga de luis sin hueso y sin piel  Lomo de cerdo  Carne de res 90 % magra  Carne de pavo magra  Pescado fresco (no frito)  Elija productos lácteos con bajo contenido de grasa:  Leche con 1 % de grasa o leche descremada  Mantequilla o margarina en aerosol  Queso con bajo contenido de grasa o sin grasa  Yogurt congelado o helado bajo en calorías  Elija frutas frescas, vegetales, frijoles y lentejas y productos con helio integral más a menudo.  Elija consumir agua con más frecuencia. Consuma bebidas dietéticas o sin calorías cuando quiera beber otra cosa. Trate de obtener katja calorías de los alimentos que consume.  Elija refrigerios inteligentes:  Frutas  Verduras  Yogur con bajo contenido de grasa o sin grasa  Queso con bajo contenido de grasa o sin grasa, flavio el queso cottage  Frutas secas (nueces, almendras, etc.) sin sal  Huevo cocido  Hummus  Guacamole  Mantequilla de maní natural  Palomitas de maíz sin mantequilla: saborizar con pia, ajo u otra especia  Galletas saladas integrales  ¿Qué alimentos deben comerse con moderación o deben evitarse?   Limite los alimentos con alto contenido de grasa, sodio y calorías flavio:  Alimentos fritos  Melchor procesadas  Productos lácteos enteros  Dulces, galletas dulces, renny fritas, productos de panadería  Salchichas, tocino y las melchor con grasa  Refrescos, jugos  Cerveza, vino y combinados (alcohol)  ¿Será necesario algún otro cuidado?   ¿Qué diana hacer antes de intentar perder peso?  Hable con el médico y el dietista para saber si necesita bajar de peso. Establezca las metas de pérdida de peso con ayuda de ellos.  Si tiene angela enfermedad crónica, flavio nivel elevado de azúcar en la joao o presión arterial gilda, pregúntele al médico o al dietista qué dieta y qué tipo de ejercicios son adecuados para usted.  Pregúntele  al médico sobre qué cantidad de ejercicio puede realizar y qué tipo de ejercicio es adecuado para usted.  Consejos útiles   Lleve un diario de alimentos para que sea más fácil llegar a katja objetivos.  Únase a un tracie de apoyo.  Consejos para quemar calorías:  Si thomas lugar de trabajo se encuentra cerca de thomas casa, camine o vaya en bicicleta en lugar de conducir.  Zaria caminatas de 20 minutos todos los días. Camine ramon thomas descanso para el almuerzo. No sólo quemará calorías sino que también aumentará thomas energía para el aleksandr del día.  Use las escaleras en lugar del elevador.  Anótese en un gimnasio o en angela clase de ejercicios con un amigo.  Intente hacer ejercicio 30 minutos al día para thomas cynthia en general. También puede realizar gentry sesiones de 10 minutos. Tenga un objetivo de 60 a 90 minutos al día para perder peso.  Nata mucha cantidad de agua antes, ramon y después de hacer ejercicios.  ¿Dónde puedo obtener más información?   Weight-Control Information Network  http://win.niddk.nih.gov/publications/for_life.htm   http://www.win.niddk.nih.gov/publications/better_health.htm   Exención de responsabilidad y uso de la información del consumidor   Esta información general es un resumen limitado de la información sobre el diagnóstico, el tratamiento y/o la medicación. No pretende ser exhaustivo y debe utilizarse flavio angela herramienta para ayudar al usuario a comprender y/o evaluar las posibles opciones de diagnóstico y tratamiento. NO incluye toda la información sobre las enfermedades, los tratamientos, los medicamentos, los efectos secundarios o los riesgos que pueden aplicarse a un paciente específico. No tiene por objeto ser un consejo médico ni un sustituto del consejo médico. [place]oco pretende reemplazar al diagnóstico o el tratamiento proporcionados por un proveedor de atención médica con base en el examen y la evaluación por parte de charlotte proveedor de las circunstancias específicas y únicas de un paciente.  Los pacientes deben hablar con un proveedor de atención médica para obtener información completa sobre thomas cynthia, preguntas médicas y opciones de tratamiento, incluidos los riesgos o beneficios relacionados con el uso de medicamentos. Esta información no respalda ningún tratamiento o medicamento flavio seguro, eficaz o aprobado para tratar a un paciente específico. [employer] y katja afiliados renuncian a cualquier garantía o responsabilidad relacionada con esta información o con el uso que se marina de esta. El uso de esta información se rige por las Condiciones de uso, disponibles en https://www.Visualmarks.com/en/know/clinical-effectiveness-terms   Copyright   Copyright © 2024 [employer] y katja licenciantes y/o afiliados. Todos los derechos reservados.    Patient Education     Dieta de recuento de calorías   Acerca de charlotte melissa   Las calorías están en los alimentos y las bebidas que consumimos. El cuerpo usa las calorías para conseguir energía. Así, puede usar la energía para realizar katja actividades diarias. Si consume más calorías de las que necesita, el cuerpo las almacenará flavio grasa corporal. Con el tiempo, esto puede producir aumento de peso. Para perder peso, queme calorías realizando actividad física y coma menos calorías de las que thomas cuerpo necesita. Cuanto más activo sea, más calorías quemará.  General   El recuento de calorías ocurre cuando se controlan las calorías que se consumen todos los días. [place] le ayuda a comer la cantidad adecuada de calorías, ni más ni menos. Debe tener en cuenta el contenido de calorías de los alimentos que consume. Marli atentamente las etiquetas de los alimentos. Le mostrarán cuántas calorías contiene angela porción. Leer las etiquetas le ayudará a dakotah decisiones saludables.       ¿Cuáles serán los resultados?   También puede aumentar o disminuir el peso corporal hasta el nivel deseado.  ¿Qué cambios en la forma de harjit se necesitan?   Deberá aprender qué alimentos puede  comer más y cuáles debe limitar. El médico o nutriólogo le ayudará a crear un plan de comidas adecuado para usted.  ¿Qué cambios en la dieta son necesarios?   Disminuya la ingesta de calorías si desea perder peso.  Aumente la ingesta de calorías si desea aumentar de peso.  ¿Quién debe realizar esta dieta?   Esta dieta es adecuada para aquellos que necesitan controlar thomas peso.  ¿Qué alimentos pueden comerse?   Coma alimentos integrales y con alto contenido de fibra.  Elija muchas frutas y verduras diferentes. Lo mejor es que chandler frescas o congeladas. De lo contrario, elija frutas enlatadas en jugo o agua, no en almíbar. Elija verduras enlatadas sin sal.  Coma más jamarcus magras flavio luis, pescado o pavo. Consuma menos carne kay.  Elija productos lácteos con bajo contenido de grasa.  ¿Qué alimentos deben comerse con moderación o deben evitarse?   Evite los alimentos con alto contenido de azúcares. Estos incluyen alimentos flavio los dulces, helado, los azúcares de gonzalez y el jarabe de maíz de gilda fructosa, que se encuentra en los refrescos y en los jugos.  Reducir el consumo de grasas sólidas flavio la mantequilla o la margarina.  Coma menos alimentos grasosos o procesados flavio los alimentos fritos y las renny fritas. Utilice grasas buenas de pescado, nueces, aguacates y aceites flavio el aceite de schultz y aceite de canola.  ¿Qué problemas podrían surgir?   Consumir demasiadas calorías puede causar sobrepeso.  Consumir muy pocas calorías puede causar peso bajo y desnutrición.  ¿Cuándo diana llamar al médico?   El problema de cynthia no mejora o se siente peor.  Si tiene preguntas sobre esta dieta  Consejos útiles   Pregúntele al médico o nutricionista sobre los tipos específicos de grupos de alimentos y los tamaños de las porciones. Pregunte al médico o nutriólogo cuántas calorías son adecuadas para usted. Asegúrese de hablar sobre el ejercicio. Es posible que necesite más calorías los días que se ejercite.  ¿Dónde puedo  obtener más información?   [facility]  http://www.nhs.uk/[place]/loseweight/Pages/understanding-calories.aspx   [facility]  http://www.EnGeneICmyplate.gov/weight-management-calories.html   Exención de responsabilidad y uso de la información del consumidor   Esta información general es un resumen limitado de la información sobre el diagnóstico, el tratamiento y/o la medicación. No pretende ser exhaustivo y debe utilizarse flavio angela herramienta para ayudar al usuario a comprender y/o evaluar las posibles opciones de diagnóstico y tratamiento. NO incluye toda la información sobre las enfermedades, los tratamientos, los medicamentos, los efectos secundarios o los riesgos que pueden aplicarse a un paciente específico. No tiene por objeto ser un consejo médico ni un sustituto del consejo médico. Tampoco pretende reemplazar al diagnóstico o el tratamiento proporcionados por un proveedor de atención médica con base en el examen y la evaluación por parte de charlotte proveedor de las circunstancias específicas y únicas de un paciente. Los pacientes deben hablar con un proveedor de atención médica para obtener información completa sobre thomas cynthia, preguntas médicas y opciones de tratamiento, incluidos los riesgos o beneficios relacionados con el uso de medicamentos. Esta información no respalda ningún tratamiento o medicamento flavio seguro, eficaz o aprobado para tratar a un paciente específico. [employer] y katja afiliados renuncian a cualquier garantía o responsabilidad relacionada con esta información o con el uso que se marina de esta. El uso de esta información se rige por las Condiciones de uso, disponibles en https://www.RoomiePicser.com/en/know/clinical-effectiveness-terms   Copyright   Copyright © 2024 [employer] y katja licenciantes y/o afiliados. Todos los derechos reservados.    Patient Education     Dieta y cynthia   Conceptos Básicos   Redactado por los médicos y editores de [employer]   ¿Por qué es importante  "llevar angela dieta saludable? -- Llevar angela dieta saludable es importante porque consumir los alimentos adecuados puede ayudarlo a conservar thomas cynthia ahora y más adelante. Puede reducir el riesgo de problemas flavio enfermedad cardíaca, diabetes, presión arterial gilda y algunos tipos de cáncer. También puede ayudarlo a vivir más tiempo y mejorar thomas calidad de harjit.  ¿Cuál es la mejor clase de dieta? -- Los expertos no recomiendan ninguna dieta específica para todos. La gente elige qué alimentos consumir por angela gran variedad de motivos, flavio thomas preferencia personal, motivos culturales o religiosos, alergias o intolerancias, y objetivos nutricionales. Además, deben tener en cuenta el costo de los distintos alimentos y la posibilidad de conseguirlos.  En general, los expertos recomiendan angela dieta que:   Incluya muchas verduras, frutas, legumbres, bree secos y cereales integrales   Limite el consumo de jamarcus abraham y procesadas, grasas no saludables, azúcar, sal y alcohol  ¿Qué son los patrones alimentarios? -- Por lo general, un \"patrón\" alimentario significa consumir ciertos tipos de alimentos y limitar otros. Algunas personas deben seguir un patrón alimentario determinado debido a necesidades de cynthia. Por ejemplo, si tiene presión arterial gilda, thomas médico podría recomendarle angela dieta baja en sal.  Si está tratando de mejorar thomas cynthia en general, puede ser de ayuda escoger un patrón alimentario saludable. No implica necesariamente ser muy estricto con respecto a lo que come y lo que jada comer. La idea es que piense en consumir muchos alimentos saludables y a la vez limite los alimentos menos saludables.  Estos son algunos ejemplos de patrones alimentarios saludables:   Dieta mediterránea - Consiste en consumir muchas frutas, verduras, bree secos y cereales integrales, y usar aceite de schultz en lugar de otras grasas. La dieta también contiene algo de pescado, aves y productos lácteos, colt no angela gran cantidad de " jamarcus abraham. Seguir esta dieta puede ser de ayuda para la cynthia en general, y hasta podría reducir el riesgo de tener un accidente cerebrovascular (derrame).   Dietas basadas en verduras - Estos patrones dan prioridad a las verduras, las frutas, los cereales, las legumbres y los bree secos. Limitan o evitan los alimentos de origen animal, flavio la carne y los lácteos. Hay muchos tipos de dietas basadas en verduras, flavio la vegetariana y la vegana.   Dieta baja en grasa - Angela dieta baja en grasa consiste en limitar las calorías que provienen de las grasas. [employer] podría ayudar a algunas personas a no subir de peso, si zhanna es thomas objetivo, colt no tiene muchos más beneficios para la cynthia. Si elige angela dieta baja en grasa, también es importante que se asegure de consumir muchos cereales integrales, legumbres, frutas y verduras. Limite el consumo de cereales y azúcar refinados.   Dieta baja en colesterol - El colesterol está presente en alimentos que contienen muchas grasas saturadas, flavio las jamarcus abraham, la mantequilla y el queso. Angela dieta baja en colesterol hace hincapié en limitar la cantidad de colesterol que consume. Limitar el colesterol en la dieta también puede ayudar a reducir la cantidad de grasas no saludables consumidas.  ¿Qué alimentos son particularmente saludables? -- Estos son algunos alimentos particularmente saludables:   Frutas y verduras - Consumir angela dieta con muchas frutas y verduras puede ayudar a prevenir las enfermedades cardíacas y los accidentes cerebrovasculares (derrames). También podría ayudar a prevenir algunos tipos de cáncer. Trate de consumir frutas y verduras en cada comida y también flavio refrigerio. Si no tiene acceso a frutas y verduras frescas, puede consumirlas congeladas o en estrella. Los médicos recomiendan consumir un mínimo de sweta porciones de frutas o verduras por día.   Cereales integrales - Entre los cereales integrales se cuentan el pan elaborado con helio berna por  "ciento integral, la andrew cortada al yuko y las pastas elaboradas con cereales integrales. Son más saludables que los alimentos elaborados con cereales \"refinados\", flavio el pan drew y el arroz drew. Se ha demostrado que consumir muchos cereales integrales en vez de cereales refinados ayuda a controlar el peso. También puede reducir el riesgo de padecer varios problemas de cynthia, flavio el cáncer de colon, la enfermedad cardíaca y la diabetes. Los médicos recomiendan que la mayoría de las personas intenten comer de 5 a 8 porciones de alimentos integrales con un alto contenido de fibra por día.   Alimentos con fibra - Consumir alimentos con mucha fibra puede ayudar a prevenir las enfermedades cardíacas y los accidentes cerebrovasculares (derrames). Si tiene diabetes tipo 2, también puede ayudar a controlar thomas nivel de azúcar en joao. Algunos alimentos que tienen mucha fibra son las verduras, las frutas, los frijoles, las nueces, el cereal de andrew y los panes y cereales integrales. Puede averiguar qué cantidad de fibra tiene un alimento si jacque la etiqueta de información nutricional (figura 1). Los médicos recomiendan que la mayoría de las personas consuman alrededor de 25 a 34 gramos de fibra por día.   Alimentos con calcio y vitamina D - Los bebés, niños y adultos necesitan calcio y vitamina D para que katja huesos chandler sukhjinder. Los adultos también necesitan calcio y vitamina D para ayudar a prevenir la osteoporosis. La osteoporosis es un padecimiento que hace que los huesos se debiliten y se rompan con más facilidad que de costumbre. Distintos alimentos y bebidas tienen calcio y vitamina D (figura 2). Es posible que las personas que no reciben suficiente calcio y vitamina D en thomas dieta deban dakotah un suplemento. Los médicos recomiendan que la mayoría de las personas consuman de 2 a 3 porciones de alimentos con calcio y vitamina D todos los días.   Alimentos que contienen proteína - Las proteínas contribuyen a que " "los músculos y los huesos se mantengan sukhjinder. Algunos alimentos saludables con un alto contenido de proteína son el luis, el pescado, los huevos, los frijoles, los bree secos y los productos a base de soja. La carne kay también tiene mucha proteína, colt además contiene grasas que pueden ser poco saludables. Los médicos recomiendan que la mayoría de las personas intenten consumir alrededor de 5 porciones de proteína por día.   Grasas saludables - Existen distintas clases de grasas. Algunas son mejores para el organismo que otras. Las grasas saludables son grasas \"monoinsaturadas\" o \"poliinsaturadas\". Están presentes en los pescados grasos, en los bree secos, en las mantequillas de bree secos y en el aguacate. Use aceites vegetales para cocinar. Algunos ejemplos de estos aceites son el de schultz, el de canola, el de cártamo, el de girasol y el de maíz. Consumir alimentos con grasas saludables, y evitar o limitar aquellos con grasas no saludables, podría reducir el riesgo de padecer enfermedad cardíaca.   Alimentos con folato - El folato es angela vitamina importante para las personas embarazadas, ya que ayuda a prevenir ciertos defectos de nacimiento. También se llama \"ácido fólico\". Todas las personas que podrían quedar embarazadas deben recibir al menos 400 microgramos diarios de ácido fólico, tanto si están buscando un embarazo flavio si no. El folato se encuentra en muchos cereales de desayuno, en las naranjas, en el jugo de naranja y en las verduras de hojas verdes.  ¿Qué alimentos debería evitar o limitar? -- Para tener angela dieta saludable, hay algunas cosas que debería evitar o limitar. Son, entre otros:   Grasas no saludables - Las grasas \"trans\" son particularmente poco saludables. Se encuentran en margarinas, muchas comidas rápidas y algunos alimentos horneados comprados en tiendas. Las grasas \"saturadas\" se encuentran en productos de origen animal flavio las jamarcus, la yema de huevo, la mantequilla, el " "queso y los productos lácteos enteros. Las grasas no saludables pueden elevar el nivel de colesterol y aumentar las posibilidades de sufrir enfermedad cardíaca.   Azúcar - Para tener angela dieta saludable, es importante limitar o evitar el azúcar agregado, los dulces y los cereales refinados. Los cereales refinados se encuentran en el pan drew, el arroz drew, la mayoría de las pastas y la mayoría de los alimentos de \"refrigerio\" envasados.  Evitar las bebidas endulzadas con azúcar, flavio las gaseosas y las bebidas deportivas, también puede contribuir a mejorar la cynthia.  Evite las frutas enlatadas en almíbar con un alto porcentaje de azúcar.   Melchor abraham y procesadas - Se ha demostrado en estudios que consumir carne kay en gran cantidad puede aumentar el riesgo de padecer ciertos problemas de cynthia, entre ellos enfermedad cardíaca y cáncer. Debe limitar la cantidad de carne kay que consume. Lo mismo aplica a las melchor procesadas, por ejemplo las salchichas, los hot dogs y el tocino (card).  ¿Puedo beber alcohol flavio parte de angela dieta saludable? -- No beber nada de alcohol es la opción más saludable. Beber regularmente puede aumentar las posibilidades de tener enfermedad hepática y ciertos tipos de cáncer. En las personas de sexo femenino, incluso angela latoya copa por día puede aumentar el riesgo de desarrollar cáncer de seno.  Si decide beber, la mayoría de los médicos recomiendan consumir no más de:   1 copa al día para las personas de sexo femenino   2 copas al día para las personas de sexo masculino  Los límites son distintos porque, en general, el cuerpo femenino tarda más en procesar el alcohol.  ¿Cuántas calorías necesito por día? -- Las calorías le dan energía a cheng cuerpo. La cantidad de calorías que necesita por día depende de cheng peso, estatura, edad, sexo y nivel de actividad.  Cheng médico o enfermero puede decirle aproximadamente cuántas calorías debe consumir por día. También puede colaborar con un " "nutricionista (experto en nutrición) para informarse sobre katja necesidades y opciones alimentarias.  ¿Qué pasa si me seda mejorar mi dieta? -- Cambiar katja hábitos de alimentación puede ser difícil. Recuerde que hasta los pequeños cambios pueden mejorar thomas cynthia.  Estas sugerencias podrían ser de ayuda:   Trate de incorporar frutas y verduras a todas katja comidas. Si no tiene frutas y verduras frescas, las congeladas y enlatadas son buenas opciones. Busque productos que no tengan kim ni azúcar agregado.   Tenga frutas a la vista para comerlas flavio refrigerio.   Cuando pueda, opte por cereales integrales en lugar de refinados. Opte por consumir luis, pescado y legumbres en lugar de jamarcus abraham y queso.   Trate de consumir alimentos preparados y procesados con menos frecuencia.   Pruebe a beber agua o agua con gas saborizada en lugar de gaseosa o jugo.   Cuando coma en restaurantes de comida rápida, busque las opciones más saludables del menú, flavio luis asado o angela ensalada.  Si tiene alguna pregunta sobre cuáles alimentos debe consumir y cuáles no, consulte a thomas médico, enfermero o nutricionista. La dieta adecuada para usted depende, en parte, de thomas cynthia y de los padecimientos médicos que tenga.  Todos los artículos se actualizan a medida que se descubre nueva evidencia y culmina nuestro proceso de evaluación por homólogos   Desi artículo se recuperó de UpToDate el: Feb 28, 2024.  Artículo 98440 Versión 26.0.es-419.1  Release: 32.2.4 - C32.58  © 2024 [employer]To[employer] Inc. Todos los derechos reservados.  figura 1: Etiqueta de información nutricional - Fibra     Desi es un ejemplo de angela etiqueta de información nutricional. Para saber cuánta fibra contiene un alimento, consulte el renglón que dice \"fibra dietaria\". También es importante observar el tamaño de la porción. Desi alimento tiene 7 gramos de fibra en cada porción y cada porción es angela taza.  Gráfico 36123 Versión 8.0  figura 2: Alimentos y bebidas con calcio y " "vitamina D     Entre los alimentos ricos en calcio se incluyen el helado, la leche de soya, los panes, la col rizada, el brócoli, la leche, el queso, el requesón, las almendras, el yogur, los cereales listos para comer, los frijoles y el tofu. Entre los alimentos ricos en vitamina D se incluyen la leche, las \"leches\" vegetales fortificadas (soya, almendras), el atún en estrella, el aceite de hígado de bacalao, el yogur, los cereales listos para comer, el salmón cocido, las luisa en estrella, la caballa y los huevos. Algunos de estos alimentos son ricos en ambas cosas.  Gráfico 87502 Versión 4.0  Exención de responsabilidad y uso de la información del consumidor   Descargo de responsabilidad: esta información generalizada es un resumen limitado de información sobre el diagnóstico, el tratamiento y/o los medicamentos. No pretende ser exhaustiva y se debe utilizar flavio herramienta para ayudar al usuario a comprender y/o evaluar las posibles opciones de diagnóstico y tratamiento. No incluye toda la información sobre afecciones, tratamientos, medicamentos, efectos secundarios o riesgos puedan ser aplicables a un paciente específico. No tiene el propósito de servir flavio recomendación médica ni de sustituir la recomendación médica, el diagnóstico o el tratamiento de un profesional de atención médica que se base en el examen y la evaluación de charlotte profesional de la cynthia respecto a las circunstancias específicas y únicas del paciente. Los pacientes deben hablar con un profesional de atención médica para obtener información completa sobre thomas cynthia, cuestiones médicas y opciones de tratamiento, incluidos los riesgos o los beneficios relacionados con el uso de medicamentos. Esta información no certifica que los tratamientos o medicamentos chandler seguros, eficaces o estén aprobados para tratar a un paciente específico. UpToDate, Inc. y katja afiliados renuncian a cualquier garantía o responsabilidad relacionada con esta información o " el uso de la misma.El uso de esta información está sujeto a las Condiciones de uso, disponibles en [email or web address]://[email or web address].com/en/know/clinical-effectiveness-terms. 2024© [employer] y katja afiliados y/o licenciantes. Todos los derechos reservados.  Copyright   © 2024 [employer] Todos los derechos reservados.

## 2024-07-11 ENCOUNTER — TELEPHONE (OUTPATIENT)
Dept: FAMILY MEDICINE CLINIC | Facility: CLINIC | Age: 69
End: 2024-07-11

## 2024-07-11 NOTE — TELEPHONE ENCOUNTER
PCP SIGNATURE NEEDED FOR Cover My meds FORM RECEIVED VIA FAX AND PLACED IN PCP FOLDER TO BE DELIVERED AT ASSIGNED TIMES.      Prior auth [employer]  0.25 mg  0.5 mg  1 mg  1.7 mg  2.4 mg

## 2024-07-18 NOTE — TELEPHONE ENCOUNTER
Angie from [facility] CALLED REGARDING PRIOR AUTH FOR PATIENT, THEY WOULD LIKE TO KNOW WHEN WILL THE FORM BE FAXED BACK TO THEM.

## 2024-07-23 ENCOUNTER — TELEPHONE (OUTPATIENT)
Dept: FAMILY MEDICINE CLINIC | Facility: CLINIC | Age: 69
End: 2024-07-23

## 2024-07-23 NOTE — TELEPHONE ENCOUNTER
Pt called regarding Prior auth [employer] pt wants to know if is approve     Please advise, thank you

## 2024-07-26 ENCOUNTER — OFFICE VISIT (OUTPATIENT)
Dept: OBGYN CLINIC | Facility: MEDICAL CENTER | Age: 69
End: 2024-07-26
Payer: COMMERCIAL

## 2024-07-26 VITALS
HEART RATE: 62 BPM | DIASTOLIC BLOOD PRESSURE: 76 MMHG | SYSTOLIC BLOOD PRESSURE: 124 MMHG | HEIGHT: 61 IN | BODY MASS INDEX: 40.02 KG/M2 | WEIGHT: 212 LBS

## 2024-07-26 DIAGNOSIS — G89.29 CHRONIC RIGHT-SIDED LOW BACK PAIN WITHOUT SCIATICA: Primary | ICD-10-CM

## 2024-07-26 DIAGNOSIS — M54.50 CHRONIC RIGHT-SIDED LOW BACK PAIN WITHOUT SCIATICA: Primary | ICD-10-CM

## 2024-07-26 DIAGNOSIS — M51.36 LUMBAR DEGENERATIVE DISC DISEASE: ICD-10-CM

## 2024-07-26 DIAGNOSIS — M43.16 SPONDYLOLISTHESIS OF LUMBAR REGION: ICD-10-CM

## 2024-07-26 PROCEDURE — 99214 OFFICE O/P EST MOD 30 MIN: CPT | Performed by: EMERGENCY MEDICINE

## 2024-07-26 RX ORDER — METHYLPREDNISOLONE 4 MG/1
TABLET ORAL
Qty: 21 TABLET | Refills: 0 | Status: SHIPPED | OUTPATIENT
Start: 2024-07-26

## 2024-07-26 NOTE — PROGRESS NOTES
Assessment/Plan:    Diagnoses and all orders for this visit:    Chronic right-sided low back pain without sciatica  -     Ambulatory Referral to Orthopedic Surgery  -     methylprednisolone (MEDROL) 4 mg tablet; Medrol dose pack, take as directed    Spondylolisthesis of lumbar region  -     methylprednisolone (MEDROL) 4 mg tablet; Medrol dose pack, take as directed    Lumbar degenerative disc disease  -     methylprednisolone (MEDROL) 4 mg tablet; Medrol dose pack, take as directed      No red flags    She treats with weight management and was prescribed Semaglutide however she has not been able to obtain.  We discussed benefit of weight loss for back pain    While taking the oral steroid Medrol Dosepak, do not take any NSAIDs such as Advil, Motrin, ibuprofen, Motrin, Aleve, naproxen, Celebrex or Meloxicam.  You can restart the NSAIDs after you finish the steroids.    However you may take Tylenol 500mg every 4-6 hours as needed OR max 1,000mg per dose up to 3 times per day for a total of 3,000mg per day  While taking oral steroids, you may experience mild side effects such as feeling jittery or flushing.  Please call if your side effects are significant or you have any questions.    - Then  While taking Mobic (meloxicam) do not take any other NSAIDs such as Advil, Motrin, ibuprofen, Celebrex, diclofenac, naproxen or Aleve.  However you may take Tylenol (acetaminophen).  You may also take Tylenol 500mg every 4-6 hours as needed OR max 1,000mg per dose up to 3 times per day for a total of 3,000mg per day    Patient declines formal PT and will perform HEP previously learned in PT.    Reviewed outside orthopedic notes as well as prior x-rays of the lumbar spine    If no improvement will obtain MRI L spine and refer to Pain Management     ([id number]) used for entire encounter    Return in about 6 weeks (around 9/6/2024).      Subjective:   Patient ID: Henna Ballard is a 68 y.o. female.    New patient presents  for chronic lower back pain she does have a history of prior right-sided Low back pain with right-sided radicular symptoms previously treated with outside orthopedics who prescribed oral steroids and physical therapy  Pt states she has been having sciatic pain that travels down her legs for the past month and states left side is worse than the right side. Pt states she received injection that provided some relief to her left side and states now her right side lower back and right hip are in the most pain. Pt some numbness and tingling that go down the lower extremities from time to time. Pt states that pain is worse in the afternoon and after walking for extended periods of time.  She was provided Toradol INJ in office with PCP and prescribed MOBIC   She treats with Weight Management was prescribed Semaglutide which is on backorder        Review of Systems    The following portions of the patient's chart were reviewed and updated as appropriate:   Allergy:    Allergies   Allergen Reactions    Aspirin GI Intolerance       Medications:    Current Outpatient Medications:     Cyanocobalamin (VITAMIN B-12 PO), Take by mouth in the morning, Disp: , Rfl:     meloxicam (Mobic) 7.5 mg tablet, Take 1 tablet (7.5 mg total) by mouth daily, Disp: 30 tablet, Rfl: 1    methylprednisolone (MEDROL) 4 mg tablet, Medrol dose pack, take as directed, Disp: 21 tablet, Rfl: 0    multivitamin (THERAGRAN) TABS, Take 1 tablet by mouth daily, Disp: , Rfl:     pravastatin (PRAVACHOL) 20 mg tablet, Take 1 tablet (20 mg total) by mouth daily at bedtime, Disp: 90 tablet, Rfl: 2    Semaglutide-Weight Management (WEGOVY) 0.25 MG/0.5ML, Inject 0.5 mL (0.25 mg total) under the skin once a week for 28 days, Disp: 2 mL, Rfl: 0    [START ON 8/4/2024] Semaglutide-Weight Management (WEGOVY) 0.5 MG/0.5ML, Inject 0.5 mL (0.5 mg total) under the skin once a week for 28 days Do not start before August 4, 2024., Disp: 2 mL, Rfl: 0    [START ON 9/1/2024]  "Semaglutide-Weight Management (WEGOVY) 1 MG/0.5ML, Inject 0.5 mL (1 mg total) under the skin once a week for 28 days Do not start before September 1, 2024., Disp: 2 mL, Rfl: 0    [START ON 9/29/2024] Semaglutide-Weight Management (WEGOVY) 1.7 MG/0.75ML, Inject 0.75 mL (1.7 mg total) under the skin once a week for 28 days Do not start before September 29, 2024., Disp: 3 mL, Rfl: 0    [START ON 10/27/2024] Semaglutide-Weight Management (WEGOVY) 2.4 MG/0.75ML, Inject 0.75 mL (2.4 mg total) under the skin once a week for 28 days Do not start before October 27, 2024., Disp: 3 mL, Rfl: 0    Patient Active Problem List   Diagnosis    Right hip pain    Other dysphagia    GERD (gastroesophageal reflux disease)    Chronic right-sided low back pain without sciatica    Prediabetes    Mixed hyperlipidemia    Obstructive sleep apnea-hypopnea syndrome    Difficulty using continuous positive airway pressure (CPAP) device    PLMD (periodic limb movement disorder)    Abnormal thyroid function test    Joint pain    Trigger index finger of left hand    Bilateral hand numbness    Class 2 severe obesity with serious comorbidity and body mass index (BMI) of 39.0 to 39.9 in adult (HCC)    Sciatica    Headache    BMI 38.0-38.9,adult    Blurry vision    Other chest pain    Acute exacerbation of chronic low back pain       Objective:  /76   Pulse 62   Ht 5' 1\" (1.549 m)   Wt 96.2 kg (212 lb)   BMI 40.06 kg/m²     Back Exam     Other   Gait: antalgic           Physical Exam  Constitutional:       Appearance: She is well-developed. She is obese.   HENT:      Head: Normocephalic and atraumatic.   Eyes:      Conjunctiva/sclera: Conjunctivae normal.   Pulmonary:      Effort: Pulmonary effort is normal.   Musculoskeletal:      Cervical back: Neck supple.   Skin:     General: Skin is warm and dry.   Neurological:      Mental Status: She is alert and oriented to person, place, and time.   Psychiatric:         Behavior: Behavior normal. "         Neurologic Exam     Mental Status   Oriented to person, place, and time.       Procedures    I have personally reviewed the written report of the pertinent studies.   [facility]  Outside Information  Results  XR SPINE LUMBAR 2 OR 3 VIEWS INJURY (Order [id number])     XR SPINE LUMBAR 2 OR 3 VIEWS INJURY  Order: [id number]  Narrative    This result has an attachment that is not available.  ` Impression: Moderate degenerative disc disease lumbar spine with  spondylolisthesis at L4-5            Past Medical History:   Diagnosis Date    Chronic right-sided low back pain without sciatica 2022    GERD (gastroesophageal reflux disease)     Hyperlipidemia     Kidney stone     Obstructive sleep apnea-hypopnea syndrome 2022       Past Surgical History:   Procedure Laterality Date     SECTION      X3    COLONOSCOPY      HAND SURGERY Right     tendon repair       Social History     Socioeconomic History    Marital status:      Spouse name: Not on file    Number of children: 3    Years of education: Not on file    Highest education level: Not on file   Occupational History    Not on file   Tobacco Use    Smoking status: Never    Smokeless tobacco: Never   Vaping Use    Vaping status: Never Used   Substance and Sexual Activity    Alcohol use: Never    Drug use: Never    Sexual activity: Yes     Partners: Male   Other Topics Concern    Not on file   Social History Narrative    Resides with mother and her     Pet dogs X 3    Occupation: retired    Hobbies/Activities: sedentary due to back pain     Social Determinants of Health     Financial Resource Strain: Low Risk  (10/31/2023)    Overall Financial Resource Strain (CARDIA)     Difficulty of Paying Living Expenses: Not hard at all   Food Insecurity: No Food Insecurity (10/31/2023)    Hunger Vital Sign     Worried About Running Out of Food in the Last Year: Never true     Ran Out of Food in the Last Year: Never true    Transportation Needs: No Transportation Needs (10/31/2023)    PRAPARE - Transportation     Lack of Transportation (Medical): No     Lack of Transportation (Non-Medical): No   Recent Concern: Transportation Needs - Unmet Transportation Needs (10/19/2023)    PRAPARE - Transportation     Lack of Transportation (Medical): Yes     Lack of Transportation (Non-Medical): Yes   Physical Activity: Inactive (10/31/2023)    Exercise Vital Sign     Days of Exercise per Week: 0 days     Minutes of Exercise per Session: 0 min   Stress: No Stress Concern Present (10/31/2023)    [employer] [place] [employer] - Occupational Stress Questionnaire     Feeling of Stress : Not at all   Social Connections: Socially Isolated (10/31/2023)    Social Connection and Isolation Panel [NHANES]     Frequency of Communication with Friends and Family: More than three times a week     Frequency of Social Gatherings with Friends and Family: More than three times a week     Attends Sikhism Services: Never     Active Member of Clubs or Organizations: No     Attends Club or Organization Meetings: Never     Marital Status:    Intimate Partner Violence: Not At Risk (10/31/2023)    Humiliation, Afraid, Rape, and Kick questionnaire     Fear of Current or Ex-Partner: No     Emotionally Abused: No     Physically Abused: No     Sexually Abused: No   Housing Stability: Low Risk  (10/31/2023)    Housing Stability Vital Sign     Unable to Pay for Housing in the Last Year: No     Number of Times Moved in the Last Year: 1     Homeless in the Last Year: No       Family History   Problem Relation Age of Onset    No Known Problems Mother         is 101 yrs old    Cancer Father     Stomach cancer Father         mets; unknown age- at 43    Lung cancer Brother     No Known Problems Son     No Known Problems Son     No Known Problems Daughter     No Known Problems Maternal Grandmother     No Known Problems Maternal Grandfather     No Known Problems  Paternal Grandmother     No Known Problems Paternal Grandfather     No Known Problems Maternal Aunt     No Known Problems Maternal Aunt     No Known Problems Maternal Aunt     No Known Problems Maternal Aunt     No Known Problems Maternal Aunt     No Known Problems Maternal Aunt     No Known Problems Maternal Aunt     Breast cancer Neg Hx     Colon cancer Neg Hx     Endometrial cancer Neg Hx     Ovarian cancer Neg Hx

## 2024-07-26 NOTE — PATIENT INSTRUCTIONS
While taking the oral steroid Medrol Dosepak, do not take any NSAIDs such as Advil, Motrin, ibuprofen, Motrin, Aleve, naproxen, Celebrex or Meloxicam.  You can restart the NSAIDs after you finish the steroids.    However you may take Tylenol 500mg every 4-6 hours as needed OR max 1,000mg per dose up to 3 times per day for a total of 3,000mg per day  While taking oral steroids, you may experience mild side effects such as feeling jittery or flushing.  Please call if your side effects are significant or you have any questions.      THEN    While taking Mobic (meloxicam) do not take any other NSAIDs such as Advil, Motrin, ibuprofen, Celebrex, diclofenac, naproxen or Aleve.  However you may take Tylenol (acetaminophen).  You may also take Tylenol 500mg every 4-6 hours as needed OR max 1,000mg per dose up to 3 times per day for a total of 3,000mg per day

## 2024-07-26 NOTE — TELEPHONE ENCOUNTER
I placed the PA in the  drop of folder in POD 3.  If you have any questions please let me know, thanks.

## 2024-07-29 ENCOUNTER — HOSPITAL ENCOUNTER (OUTPATIENT)
Dept: MAMMOGRAPHY | Facility: CLINIC | Age: 69
Discharge: HOME | End: 2024-07-29
Payer: COMMERCIAL

## 2024-07-29 VITALS — HEIGHT: 61 IN | BODY MASS INDEX: 40.02 KG/M2 | WEIGHT: 212 LBS

## 2024-07-29 DIAGNOSIS — R92.8 ABNORMAL MAMMOGRAM: ICD-10-CM

## 2024-07-29 PROCEDURE — G0279 TOMOSYNTHESIS, MAMMO: HCPCS

## 2024-07-29 PROCEDURE — 77066 DX MAMMO INCL CAD BI: CPT

## 2024-08-20 NOTE — TELEPHONE ENCOUNTER
Patient was seen in office 2/1/24, and wishes to follow up on a request for referral to ophthalmologist for blurry vision. Can a referral be written for this?   Pt refused G bathe this AM, several attempts were made and pt was educated on the importance of the bath.

## 2024-08-31 DIAGNOSIS — M54.50 ACUTE EXACERBATION OF CHRONIC LOW BACK PAIN: ICD-10-CM

## 2024-08-31 DIAGNOSIS — G89.29 ACUTE EXACERBATION OF CHRONIC LOW BACK PAIN: ICD-10-CM

## 2024-09-03 RX ORDER — MELOXICAM 7.5 MG/1
7.5 TABLET ORAL DAILY
Qty: 30 TABLET | Refills: 1 | Status: SHIPPED | OUTPATIENT
Start: 2024-09-03

## 2024-09-05 ENCOUNTER — OFFICE VISIT (OUTPATIENT)
Dept: OBGYN CLINIC | Facility: MEDICAL CENTER | Age: 69
End: 2024-09-05
Payer: COMMERCIAL

## 2024-09-05 VITALS
HEART RATE: 72 BPM | DIASTOLIC BLOOD PRESSURE: 76 MMHG | BODY MASS INDEX: 40.02 KG/M2 | WEIGHT: 212 LBS | HEIGHT: 61 IN | SYSTOLIC BLOOD PRESSURE: 124 MMHG

## 2024-09-05 DIAGNOSIS — M51.36 LUMBAR DEGENERATIVE DISC DISEASE: ICD-10-CM

## 2024-09-05 DIAGNOSIS — G89.29 CHRONIC RIGHT-SIDED LOW BACK PAIN WITHOUT SCIATICA: Primary | ICD-10-CM

## 2024-09-05 DIAGNOSIS — M43.16 SPONDYLOLISTHESIS OF LUMBAR REGION: ICD-10-CM

## 2024-09-05 DIAGNOSIS — M54.50 CHRONIC RIGHT-SIDED LOW BACK PAIN WITHOUT SCIATICA: Primary | ICD-10-CM

## 2024-09-05 PROCEDURE — 1160F RVW MEDS BY RX/DR IN RCRD: CPT | Performed by: EMERGENCY MEDICINE

## 2024-09-05 PROCEDURE — 1159F MED LIST DOCD IN RCRD: CPT | Performed by: EMERGENCY MEDICINE

## 2024-09-05 PROCEDURE — 99213 OFFICE O/P EST LOW 20 MIN: CPT | Performed by: EMERGENCY MEDICINE

## 2024-09-05 RX ORDER — GABAPENTIN 100 MG/1
100 CAPSULE ORAL
COMMUNITY
Start: 2024-05-12

## 2024-09-05 NOTE — PROGRESS NOTES
Assessment/Plan:    Diagnoses and all orders for this visit:    Chronic right-sided low back pain without sciatica  -     MRI lumbar spine wo contrast; Future  -     Cancel: Ambulatory referral to Spine & Pain Management; Future    Spondylolisthesis of lumbar region  -     MRI lumbar spine wo contrast; Future  -     Cancel: Ambulatory referral to Spine & Pain Management; Future    Lumbar degenerative disc disease  -     MRI lumbar spine wo contrast; Future  -     Cancel: Ambulatory referral to Spine & Pain Management; Future    Other orders  -     gabapentin (NEURONTIN) 100 mg capsule; Take 100 mg by mouth       ([id number] ) used for entire encounter    Return if symptoms worsen or fail to improve.      Subjective:   Patient ID: Henna Ballard is a 68 y.o. female.    Patient returns s/p MEDROL pack with significant improvement.  She has been performing home exercises.  She still experiences pain of the lower back.      Initial note:  New patient presents for chronic lower back pain she does have a history of prior right-sided Low back pain with right-sided radicular symptoms previously treated with outside orthopedics who prescribed oral steroids and physical therapy  Pt states she has been having sciatic pain that travels down her legs for the past month and states left side is worse than the right side. Pt states she received injection that provided some relief to her left side and states now her right side lower back and right hip are in the most pain. Pt some numbness and tingling that go down the lower extremities from time to time. Pt states that pain is worse in the afternoon and after walking for extended periods of time.  She was provided Toradol INJ in office with PCP and prescribed MOBIC   She treats with Weight Management was prescribed Semaglutide which is on backorder        Review of Systems    The following portions of the patient's chart were reviewed and updated as appropriate:    Allergy:    Allergies   Allergen Reactions    Aspirin GI Intolerance       Medications:    Current Outpatient Medications:     Cyanocobalamin (VITAMIN B-12 PO), Take by mouth in the morning, Disp: , Rfl:     gabapentin (NEURONTIN) 100 mg capsule, Take 100 mg by mouth, Disp: , Rfl:     meloxicam (MOBIC) 7.5 mg tablet, TAKE 1 TABLET BY MOUTH EVERY DAY, Disp: 30 tablet, Rfl: 1    methylprednisolone (MEDROL) 4 mg tablet, Medrol dose pack, take as directed, Disp: 21 tablet, Rfl: 0    multivitamin (THERAGRAN) TABS, Take 1 tablet by mouth daily, Disp: , Rfl:     pravastatin (PRAVACHOL) 20 mg tablet, Take 1 tablet (20 mg total) by mouth daily at bedtime, Disp: 90 tablet, Rfl: 2    Semaglutide-Weight Management (WEGOVY) 1 MG/0.5ML, Inject 0.5 mL (1 mg total) under the skin once a week for 28 days Do not start before September 1, 2024., Disp: 2 mL, Rfl: 0    [START ON 9/29/2024] Semaglutide-Weight Management (WEGOVY) 1.7 MG/0.75ML, Inject 0.75 mL (1.7 mg total) under the skin once a week for 28 days Do not start before September 29, 2024., Disp: 3 mL, Rfl: 0    [START ON 10/27/2024] Semaglutide-Weight Management (WEGOVY) 2.4 MG/0.75ML, Inject 0.75 mL (2.4 mg total) under the skin once a week for 28 days Do not start before October 27, 2024., Disp: 3 mL, Rfl: 0    Patient Active Problem List   Diagnosis    Right hip pain    Other dysphagia    GERD (gastroesophageal reflux disease)    Chronic right-sided low back pain without sciatica    Prediabetes    Mixed hyperlipidemia    Obstructive sleep apnea-hypopnea syndrome    Difficulty using continuous positive airway pressure (CPAP) device    PLMD (periodic limb movement disorder)    Abnormal thyroid function test    Joint pain    Trigger index finger of left hand    Bilateral hand numbness    Class 2 severe obesity with serious comorbidity and body mass index (BMI) of 39.0 to 39.9 in adult (HCC)    Sciatica    Headache    BMI 38.0-38.9,adult    Blurry vision    Other chest pain  "   Acute exacerbation of chronic low back pain       Objective:  /76   Pulse 72   Ht 5' 1\" (1.549 m)   Wt 96.2 kg (212 lb)   BMI 40.06 kg/m²     Ortho Exam    Physical Exam      Neurologic Exam    Procedures    I have personally reviewed pertinent films in PACS and my interpretation is           Previous Xrays L spine  [facility]  Outside Information  Results  XR SPINE LUMBAR 2 OR 3 VIEWS INJURY (Order [id number])     XR SPINE LUMBAR 2 OR 3 VIEWS INJURY  Order: [id number]  Narrative     This result has an attachment that is not available.  ` Impression: Moderate degenerative disc disease lumbar spine with  spondylolisthesis at L4-5.            Past Medical History:   Diagnosis Date    Chronic right-sided low back pain without sciatica 2022    GERD (gastroesophageal reflux disease)     Hyperlipidemia     Kidney stone     Obstructive sleep apnea-hypopnea syndrome 2022       Past Surgical History:   Procedure Laterality Date     SECTION      X3    COLONOSCOPY      HAND SURGERY Right     tendon repair       Social History     Socioeconomic History    Marital status:      Spouse name: Not on file    Number of children: 3    Years of education: Not on file    Highest education level: Not on file   Occupational History    Not on file   Tobacco Use    Smoking status: Never    Smokeless tobacco: Never   Vaping Use    Vaping status: Never Used   Substance and Sexual Activity    Alcohol use: Never    Drug use: Never    Sexual activity: Yes     Partners: Male   Other Topics Concern    Not on file   Social History Narrative    Resides with mother and her     Pet dogs X 3    Occupation: retired    Hobbies/Activities: sedentary due to back pain     Social Determinants of Health     Financial Resource Strain: Low Risk  (10/31/2023)    Overall Financial Resource Strain (CARDIA)     Difficulty of Paying Living Expenses: Not hard at all   Food Insecurity: No Food Insecurity " (10/31/2023)    Hunger Vital Sign     Worried About Running Out of Food in the Last Year: Never true     Ran Out of Food in the Last Year: Never true   Transportation Needs: No Transportation Needs (10/31/2023)    PRAPARE - Transportation     Lack of Transportation (Medical): No     Lack of Transportation (Non-Medical): No   Recent Concern: Transportation Needs - Unmet Transportation Needs (10/19/2023)    PRAPARE - Transportation     Lack of Transportation (Medical): Yes     Lack of Transportation (Non-Medical): Yes   Physical Activity: Inactive (10/31/2023)    Exercise Vital Sign     Days of Exercise per Week: 0 days     Minutes of Exercise per Session: 0 min   Stress: No Stress Concern Present (10/31/2023)    [employer] - Occupational Stress Questionnaire     Feeling of Stress : Not at all   Social Connections: Socially Isolated (10/31/2023)    Social Connection and Isolation Panel [NHANES]     Frequency of Communication with Friends and Family: More than three times a week     Frequency of Social Gatherings with Friends and Family: More than three times a week     Attends [employer] Services: Never     Active Member of Clubs or Organizations: No     Attends Club or Organization Meetings: Never     Marital Status:    Intimate Partner Violence: Not At Risk (10/31/2023)    Humiliation, Afraid, Rape, and Kick questionnaire     Fear of Current or Ex-Partner: No     Emotionally Abused: No     Physically Abused: No     Sexually Abused: No   Housing Stability: Low Risk  (10/31/2023)    Housing Stability Vital Sign     Unable to Pay for Housing in the Last Year: No     Number of Times Moved in the Last Year: 1     Homeless in the Last Year: No       Family History   Problem Relation Age of Onset    No Known Problems Mother         is 101 yrs old    Cancer Father     Stomach cancer Father         mets; unknown age- at 43    Lung cancer Brother     No Known Problems Son     No Known  Problems Son     No Known Problems Daughter     No Known Problems Maternal Grandmother     No Known Problems Maternal Grandfather     No Known Problems Paternal Grandmother     No Known Problems Paternal Grandfather     No Known Problems Maternal Aunt     No Known Problems Maternal Aunt     No Known Problems Maternal Aunt     No Known Problems Maternal Aunt     No Known Problems Maternal Aunt     No Known Problems Maternal Aunt     No Known Problems Maternal Aunt     Breast cancer Neg Hx     Colon cancer Neg Hx     Endometrial cancer Neg Hx     Ovarian cancer Neg Hx

## 2024-09-25 ENCOUNTER — HOSPITAL ENCOUNTER (OUTPATIENT)
Dept: RADIOLOGY | Facility: HOSPITAL | Age: 69
Discharge: HOME | End: 2024-09-25
Attending: EMERGENCY MEDICINE
Payer: COMMERCIAL

## 2024-09-25 DIAGNOSIS — G89.29 CHRONIC RIGHT-SIDED LOW BACK PAIN WITHOUT SCIATICA: ICD-10-CM

## 2024-09-25 DIAGNOSIS — M51.36 LUMBAR DEGENERATIVE DISC DISEASE: ICD-10-CM

## 2024-09-25 DIAGNOSIS — M54.50 CHRONIC RIGHT-SIDED LOW BACK PAIN WITHOUT SCIATICA: ICD-10-CM

## 2024-09-25 DIAGNOSIS — M51.369 LUMBAR DEGENERATIVE DISC DISEASE: ICD-10-CM

## 2024-09-25 DIAGNOSIS — M43.16 SPONDYLOLISTHESIS OF LUMBAR REGION: ICD-10-CM

## 2024-09-25 PROCEDURE — 72148 MRI LUMBAR SPINE W/O DYE: CPT

## 2024-10-09 ENCOUNTER — OFFICE VISIT (OUTPATIENT)
Dept: DENTISTRY | Facility: CLINIC | Age: 69
End: 2024-10-09

## 2024-10-09 VITALS — DIASTOLIC BLOOD PRESSURE: 55 MMHG | SYSTOLIC BLOOD PRESSURE: 115 MMHG | TEMPERATURE: 98.2 F | HEART RATE: 72 BPM

## 2024-10-09 DIAGNOSIS — Z01.20 ENCOUNTER FOR DENTAL EXAMINATION: Primary | ICD-10-CM

## 2024-10-09 PROCEDURE — D0140 LIMITED ORAL EVALUATION - PROBLEM FOCUSED: HCPCS

## 2024-10-09 NOTE — PROGRESS NOTES
"Dental procedures in this visit     - LIMITED ORAL EVALUATION - PROBLEM FOCUSED (Completed)     Service provider: Miguelangel Schaffer DMD     Billing provider: Miguelangel Schaffer DMD     Subjective   Patient ID: Henna Ballard is a 68 y.o. female.  No chief complaint on file.    HPI  The following portions of the chart were reviewed this encounter and updated as appropriate:    Tobacco  Allergies  Meds  Problems  Med Hx  Surg Hx  Fam Hx           Objective   Soft Tissue Exam  No findings documented this visit      Dental Exam    Hard Tissue Exam:  #9 superficial incisal chip    Assessment & Plan   Problem List Items Addressed This Visit    None  Visit Diagnoses       Encounter for dental examination    -  Primary    Relevant Orders    LIMITED ORAL EVALUATION - PROBLEM FOCUSED (Completed)        69 yo female patient presents with chief complaint: \"I chipped one of my front teeth. The tooth doesn't hurt but it's sharp to my tongue.\" Upon clinical exam, #9 presents with a superficial incisal chip. I recommend smoothing the area today and having the patient return for a definitive restoration. She agreed. Using an enhance tip, the area was smoothed to the patient satisfaction.     NV: #9 LI  "

## 2024-10-11 ENCOUNTER — OFFICE VISIT (OUTPATIENT)
Dept: FAMILY MEDICINE CLINIC | Facility: CLINIC | Age: 69
End: 2024-10-11

## 2024-10-11 VITALS
OXYGEN SATURATION: 98 % | WEIGHT: 201.6 LBS | HEART RATE: 80 BPM | RESPIRATION RATE: 17 BRPM | HEIGHT: 61 IN | DIASTOLIC BLOOD PRESSURE: 74 MMHG | TEMPERATURE: 97.6 F | BODY MASS INDEX: 38.06 KG/M2 | SYSTOLIC BLOOD PRESSURE: 126 MMHG

## 2024-10-11 DIAGNOSIS — N28.1 RENAL CYST, RIGHT: ICD-10-CM

## 2024-10-11 DIAGNOSIS — Z23 ENCOUNTER FOR IMMUNIZATION: ICD-10-CM

## 2024-10-11 DIAGNOSIS — G89.29 ACUTE EXACERBATION OF CHRONIC LOW BACK PAIN: ICD-10-CM

## 2024-10-11 DIAGNOSIS — R73.03 PREDIABETES: ICD-10-CM

## 2024-10-11 DIAGNOSIS — E78.2 MIXED HYPERLIPIDEMIA: Primary | ICD-10-CM

## 2024-10-11 DIAGNOSIS — E66.01 CLASS 2 SEVERE OBESITY WITH SERIOUS COMORBIDITY AND BODY MASS INDEX (BMI) OF 39.0 TO 39.9 IN ADULT, UNSPECIFIED OBESITY TYPE (HCC): ICD-10-CM

## 2024-10-11 DIAGNOSIS — M54.50 ACUTE EXACERBATION OF CHRONIC LOW BACK PAIN: ICD-10-CM

## 2024-10-11 DIAGNOSIS — R94.6 ABNORMAL THYROID FUNCTION TEST: ICD-10-CM

## 2024-10-11 DIAGNOSIS — E66.812 CLASS 2 SEVERE OBESITY WITH SERIOUS COMORBIDITY AND BODY MASS INDEX (BMI) OF 39.0 TO 39.9 IN ADULT, UNSPECIFIED OBESITY TYPE (HCC): ICD-10-CM

## 2024-10-11 PROCEDURE — 90750 HZV VACC RECOMBINANT IM: CPT | Performed by: FAMILY MEDICINE

## 2024-10-11 PROCEDURE — 90662 IIV NO PRSV INCREASED AG IM: CPT | Performed by: FAMILY MEDICINE

## 2024-10-11 PROCEDURE — 90471 IMMUNIZATION ADMIN: CPT | Performed by: FAMILY MEDICINE

## 2024-10-11 PROCEDURE — 90472 IMMUNIZATION ADMIN EACH ADD: CPT | Performed by: FAMILY MEDICINE

## 2024-10-11 PROCEDURE — 99214 OFFICE O/P EST MOD 30 MIN: CPT | Performed by: FAMILY MEDICINE

## 2024-10-11 NOTE — ASSESSMENT & PLAN NOTE
Lab Results   Component Value Date/Time    CHOLESTEROL 233 (H) 02/08/2024 11:52 AM    TRIG 105 02/08/2024 11:52 AM    HDL 90 02/08/2024 11:52 AM    LDLCALC 122 (H) 02/08/2024 11:52 AM     Improved  Continue Pravastatin 20 mg daily  Recheck lipid panel  Low Fat Diet  Regular Exercise    Orders:    Lipid panel; Future    Comprehensive metabolic panel; Future

## 2024-10-11 NOTE — ASSESSMENT & PLAN NOTE
Orders:    Lipid panel; Future    Hemoglobin A1C; Future    Comprehensive metabolic panel; Future    TSH + Free T4; Future

## 2024-10-11 NOTE — ASSESSMENT & PLAN NOTE
Subclinical hyperthyroidism  Recheck thyroid function tests    Orders:    TSH + Free T4; Future    US thyroid; Future

## 2024-10-11 NOTE — ASSESSMENT & PLAN NOTE
Lab Results   Component Value Date/Time    HGBA1C 6.1 (H) 02/08/2024 11:52 AM     Diet controlled  Recheck hemoglobin A1c  Orders:    Hemoglobin A1C; Future    Comprehensive metabolic panel; Future

## 2024-10-11 NOTE — ASSESSMENT & PLAN NOTE
Orders:    Semaglutide-Weight Management (WEGOVY) 2.4 MG/0.75ML; Inject 0.75 mL (2.4 mg total) under the skin once a week for 28 days Do not start before October 27, 2024.

## 2024-10-11 NOTE — PROGRESS NOTES
Ambulatory Visit  Name: Henna Ballard      : 1955      MRN: [id number]  Encounter Provider: Pina Martins MD  Encounter Date: 10/11/2024   Encounter department: [facility]    Assessment & Plan  Mixed hyperlipidemia  Lab Results   Component Value Date/Time    CHOLESTEROL 233 (H) 2024 11:52 AM    TRIG 105 2024 11:52 AM    HDL 90 2024 11:52 AM    LDLCALC 122 (H) 2024 11:52 AM     Improved  Continue Pravastatin 20 mg daily  Recheck lipid panel  Low Fat Diet  Regular Exercise    Orders:    Lipid panel; Future    Comprehensive metabolic panel; Future    Renal cyst, right  Incidental right renal lesion on MRI lumbar spine 2024  Will order a renal ultrasound to evaluate further  Orders:    US kidney and bladder with pvr; Future    Prediabetes  Lab Results   Component Value Date/Time    HGBA1C 6.1 (H) 2024 11:52 AM     Diet controlled  Recheck hemoglobin A1c  Orders:    Hemoglobin A1C; Future    Comprehensive metabolic panel; Future    Abnormal thyroid function test  Subclinical hyperthyroidism  Recheck thyroid function tests    Orders:    TSH + Free T4; Future    US thyroid; Future    Encounter for immunization    Orders:    influenza vaccine, high-dose, PF 0.5 mL (Fluzone High Dose)    Zoster Vaccine Recombinant IM    BMI 38.0-38.9,adult    Orders:    Lipid panel; Future    Hemoglobin A1C; Future    Comprehensive metabolic panel; Future    TSH + Free T4; Future    Class 2 severe obesity with serious comorbidity and body mass index (BMI) of 39.0 to 39.9 in adult, unspecified obesity type (HCC)      Orders:    Semaglutide-Weight Management (WEGOVY) 2.4 MG/0.75ML; Inject 0.75 mL (2.4 mg total) under the skin once a week for 28 days Do not start before 2024.    Acute exacerbation of chronic low back pain  Chronic lower back pain  History of chronic right-sided lower back pain with sciatica-right-sided  Ice, heat, stretches,  massage  Continue meloxicam, to be taken with food         BMI Counseling: Body mass index is 38.09 kg/m². The BMI is above normal. Nutrition recommendations include decreasing portion sizes, encouraging healthy choices of fruits and vegetables, decreasing fast food intake, consuming healthier snacks and limiting drinks that contain sugar. Exercise recommendations include moderate physical activity 150 minutes/week. Rationale for BMI follow-up plan is due to patient being overweight or obese.     Depression Screening and Follow-up Plan: Patient was screened for depression during today's encounter. They screened negative with a PHQ-2 score of 0.      History of Present Illness     HPI  Henna Ballard is a 68 y.o. female  has a past medical history of Chronic right-sided low back pain without sciatica, GERD (gastroesophageal reflux disease), Hyperlipidemia, Kidney stone, and Obstructive sleep apnea-hypopnea syndrome. who presented to the office today to follow up for chornic back pain.  The patient continues to have chronic lower back pain, but the acute pain that she had 3 months ago has improved.    The following portions of the patient's history were reviewed and updated as appropriate: allergies, current medications, past family history, past medical history, past social history, past surgical history and problem list.    History obtained from : patient  Review of Systems   Constitutional:  Positive for fatigue. Negative for chills and fever.   HENT:  Negative for congestion, rhinorrhea and sore throat.    Respiratory:  Negative for cough and shortness of breath.    Cardiovascular:  Negative for chest pain.   Gastrointestinal:  Negative for diarrhea, nausea and vomiting.   Musculoskeletal:  Positive for arthralgias, back pain and gait problem.   Skin:  Negative for rash.   Neurological:  Negative for dizziness and headaches.           Objective     /74 (BP Location: Left arm, Patient Position: Sitting)   Pulse  "80   Temp 97.6 °F (36.4 °C) (Temporal)   Resp 17   Ht 5' 1\" (1.549 m)   Wt 91.4 kg (201 lb 9.6 oz)   SpO2 98%   BMI 38.09 kg/m²     Physical Exam  Vitals and nursing note reviewed.   Constitutional:       General: She is not in acute distress.     Appearance: She is well-developed. She is obese.   HENT:      Head: Normocephalic and atraumatic.      Right Ear: External ear normal.      Left Ear: External ear normal.      Mouth/Throat:      Mouth: Mucous membranes are moist.   Eyes:      Conjunctiva/sclera: Conjunctivae normal.   Cardiovascular:      Rate and Rhythm: Normal rate and regular rhythm.      Heart sounds: No murmur heard.  Pulmonary:      Effort: Pulmonary effort is normal. No respiratory distress.      Breath sounds: Normal breath sounds.   Abdominal:      Palpations: Abdomen is soft.      Tenderness: There is no abdominal tenderness.   Musculoskeletal:         General: No swelling.      Cervical back: Neck supple.      Lumbar back: Spasms and tenderness present.      Comments: + walking cane   Skin:     General: Skin is warm and dry.      Capillary Refill: Capillary refill takes less than 2 seconds.   Neurological:      Mental Status: She is alert and oriented to person, place, and time.      Motor: No weakness.      Gait: Gait normal.   Psychiatric:         Mood and Affect: Mood normal.         Behavior: Behavior normal.         "

## 2024-10-16 PROBLEM — M54.42 CHRONIC BILATERAL LOW BACK PAIN WITH LEFT-SIDED SCIATICA: Status: ACTIVE | Noted: 2024-07-09

## 2024-10-16 NOTE — ASSESSMENT & PLAN NOTE
Chronic lower back pain  History of chronic right-sided lower back pain with sciatica-right-sided  Ice, heat, stretches, massage  Continue meloxicam, to be taken with food

## 2024-10-21 ENCOUNTER — CONSULT (OUTPATIENT)
Dept: PAIN MEDICINE | Facility: MEDICAL CENTER | Age: 69
End: 2024-10-21
Payer: COMMERCIAL

## 2024-10-21 VITALS
HEART RATE: 69 BPM | SYSTOLIC BLOOD PRESSURE: 129 MMHG | WEIGHT: 203 LBS | DIASTOLIC BLOOD PRESSURE: 79 MMHG | HEIGHT: 61 IN | BODY MASS INDEX: 38.33 KG/M2

## 2024-10-21 DIAGNOSIS — M46.1 SACROILIITIS (HCC): ICD-10-CM

## 2024-10-21 DIAGNOSIS — G89.29 CHRONIC RIGHT-SIDED LOW BACK PAIN WITHOUT SCIATICA: Primary | ICD-10-CM

## 2024-10-21 DIAGNOSIS — M54.50 CHRONIC RIGHT-SIDED LOW BACK PAIN WITHOUT SCIATICA: Primary | ICD-10-CM

## 2024-10-21 PROCEDURE — 99204 OFFICE O/P NEW MOD 45 MIN: CPT | Performed by: PHYSICAL MEDICINE & REHABILITATION

## 2024-10-21 NOTE — PROGRESS NOTES
Assessment  1. Chronic right-sided low back pain without sciatica    2. Sacroiliitis (HCC)        Plan    I did have an in depth conversation with the patient today. Clearly, the patient's pain has persisted despite time, relative rest, activity modification as well as therapy. The patient's examination and symptoms would suggest an underlying sacroiliac joint etiology.  I would recommend proceeding with right intra-articular sacroiliac joint injection under fluoroscopic guidance.  The injection will serve both diagnostic and hopefully therapeutic roles for the patient. In the office today, we reviewed the nature of sacroiliac joint pathology in depth using a spine model. We discussed the approach we would use for the sacroiliac joint injection and provided literature for home review. Complete risks and benefits including bleeding, infection, tissue reaction, allergic reaction were reviewed and verbal and written consent was obtained.  4. Could also consider lumbar medial branch blocks bilaterally at L3-5.    My impressions and treatment recommendations were discussed in detail with the patient who verbalized understanding and had no further questions.  Discharge instructions were provided. I personally saw and examined the patient and I agree with the above discussed plan of care.    Orders Placed This Encounter   Procedures    FL spine and pain procedure     Standing Status:   Future     Standing Expiration Date:   10/21/2025     Order Specific Question:   Reason for Exam:     Answer:   (R) SIJ injection     Order Specific Question:   Anticoagulant hold needed?     Answer:   no     No orders of the defined types were placed in this encounter.      History of Present Illness    Henna Ballard is a 68 y.o. female seen in consultation at the request of Dr. Fleming regarding chronic lower back pain complaints.  Patient's been experiencing symptoms chronically without any inciting event or trauma.  She is describing  moderate to severe intensity pain rated as an 8/10 which is constant without any typical pattern.  She is localizing most of the pain to the right lower back region in the sacroiliac joint as well as some referred pain into the right groin and hip region.    Aggravating factors include standing bending walking.  Alleviating factors include exercise and relaxation.    Diagnostic studies include MRI of the lumbar spine which does reveal some foraminal narrowing as well as edema within the L5 pedicles.  There were some pelvic findings as well which I recommend the patient follow-up with her primary care physician about.  She states that she does have some ultrasound scheduled tomorrow.    Social history negative for tobacco marijuana and alcohol use.    Currently using meloxicam but hopeful for further improvement with interventional approaches.    I have personally reviewed and/or updated the patient's past medical history, past surgical history, family history, social history, current medications, allergies, and vital signs today.     Review of Systems   Constitutional:  Positive for chills and unexpected weight change. Negative for fever.   HENT:  Positive for sore throat. Negative for ear pain, hearing loss and sinus pain.    Eyes:  Negative for pain and redness.   Respiratory:  Negative for shortness of breath and wheezing.    Cardiovascular:  Negative for palpitations and leg swelling.   Gastrointestinal:  Negative for abdominal pain, constipation and vomiting.   Endocrine: Negative for polydipsia and polyuria.   Genitourinary:  Negative for difficulty urinating and hematuria.   Musculoskeletal:  Positive for arthralgias, back pain, gait problem, joint swelling and myalgias.   Skin:  Negative for rash.   Neurological:  Positive for numbness and headaches. Negative for weakness.   Psychiatric/Behavioral:  Negative for confusion and sleep disturbance. The patient is not nervous/anxious.        Patient Active  Problem List   Diagnosis    Right hip pain    Other dysphagia    GERD (gastroesophageal reflux disease)    Chronic right-sided low back pain without sciatica    Prediabetes    Mixed hyperlipidemia    Obstructive sleep apnea-hypopnea syndrome    Difficulty using continuous positive airway pressure (CPAP) device    PLMD (periodic limb movement disorder)    Abnormal thyroid function test    Joint pain    Trigger index finger of left hand    Bilateral hand numbness    Class 2 severe obesity with serious comorbidity and body mass index (BMI) of 39.0 to 39.9 in adult (HCC)    Sciatica    Headache    BMI 38.0-38.9,adult    Blurry vision    Other chest pain    Chronic bilateral low back pain with left-sided sciatica       Past Medical History:   Diagnosis Date    Chronic right-sided low back pain without sciatica 2022    GERD (gastroesophageal reflux disease)     Hyperlipidemia     Kidney stone     Obstructive sleep apnea-hypopnea syndrome 2022       Past Surgical History:   Procedure Laterality Date     SECTION      X3    COLONOSCOPY      HAND SURGERY Right     tendon repair       Family History   Problem Relation Age of Onset    No Known Problems Mother         is 101 yrs old    Cancer Father     Stomach cancer Father         mets; unknown age- at 43    Lung cancer Brother     No Known Problems Son     No Known Problems Son     No Known Problems Daughter     No Known Problems Maternal Grandmother     No Known Problems Maternal Grandfather     No Known Problems Paternal Grandmother     No Known Problems Paternal Grandfather     No Known Problems Maternal Aunt     No Known Problems Maternal Aunt     No Known Problems Maternal Aunt     No Known Problems Maternal Aunt     No Known Problems Maternal Aunt     No Known Problems Maternal Aunt     No Known Problems Maternal Aunt     Breast cancer Neg Hx     Colon cancer Neg Hx     Endometrial cancer Neg Hx     Ovarian cancer Neg Hx        Social History  "    Occupational History    Not on file   Tobacco Use    Smoking status: Never    Smokeless tobacco: Never   Vaping Use    Vaping status: Never Used   Substance and Sexual Activity    Alcohol use: Never    Drug use: Never    Sexual activity: Yes     Partners: Male       Current Outpatient Medications on File Prior to Visit   Medication Sig    Cyanocobalamin (VITAMIN B-12 PO) Take by mouth in the morning    gabapentin (NEURONTIN) 100 mg capsule Take 100 mg by mouth    meloxicam (MOBIC) 7.5 mg tablet TAKE 1 TABLET BY MOUTH EVERY DAY    methylprednisolone (MEDROL) 4 mg tablet Medrol dose pack, take as directed    multivitamin (THERAGRAN) TABS Take 1 tablet by mouth daily    pravastatin (PRAVACHOL) 20 mg tablet Take 1 tablet (20 mg total) by mouth daily at bedtime    Semaglutide-Weight Management (WEGOVY) 1.7 MG/0.75ML Inject 0.75 mL (1.7 mg total) under the skin once a week for 28 days Do not start before September 29, 2024.    [START ON 10/27/2024] Semaglutide-Weight Management (WEGOVY) 2.4 MG/0.75ML Inject 0.75 mL (2.4 mg total) under the skin once a week for 28 days Do not start before October 27, 2024.     No current facility-administered medications on file prior to visit.       Allergies   Allergen Reactions    Aspirin GI Intolerance       Physical Exam    /79   Pulse 69   Ht 5' 1\" (1.549 m)   Wt 92.1 kg (203 lb)   BMI 38.36 kg/m²     Constitutional: normal, well developed, well nourished, alert, in no distress and non-toxic and no overt pain behavior.  Eyes: anicteric  HEENT: grossly intact  Neck: supple, symmetric, trachea midline and no masses   Pulmonary:even and unlabored  Cardiovascular:No edema or pitting edema present  Skin:Normal without rashes or lesions and well hydrated  Psychiatric:Mood and affect appropriate  Neurologic:Cranial Nerves II-XII grossly intact, bilateral lower extremity muscle stretch reflexes are physiologic and symmetric at the knees and ankles, bilateral lower extremity " strength is normal, negative straight leg raise from a seated position  Musculoskeletal:normal, except for significant tenderness to palpation over the right sacroiliac joint region, pain is also reproduced with lumbar extension.    Sacroiliac joint testing is positive on the right for the following tests:  + Steven finger sign  + Miguelangel's test  + Gaenslen's test  + Posterior pelvic pain provocation  + Sacroiliac joint compression test    Imaging  Study Result    Narrative & Impression   MRI LUMBAR SPINE WITHOUT CONTRAST     INDICATION: M54.50: Low back pain, unspecified  G89.29: Other chronic pain  M43.16: Spondylolisthesis, lumbar region  M51.36: Other intervertebral disc degeneration, lumbar region.     COMPARISON:  None.     TECHNIQUE:  Multiplanar, multisequence imaging of the lumbar spine was performed. .        IMAGE QUALITY: Motion degraded examination particularly the axial and sagittal T2 sequences     FINDINGS:     VERTEBRAL BODIES:  There are 5 lumbar type vertebral bodies. Mild levocurvature of the lumbar spine centered at L3-L4 grade 1 anterolisthesis of L4 on L5. No scoliosis.  No compression fracture. Edema within bilateral L5 pedicles. No suspicious bone   marrow lesion.     SACRUM:  Normal signal within the sacrum. No evidence of insufficiency or stress fracture.     DISTAL CORD AND CONUS:  Normal size and signal within the distal cord and conus.     PARASPINAL SOFT TISSUES:  Paraspinal soft tissues are unremarkable.     LOWER THORACIC DISC SPACES:  Normal disc height.  No disc herniation, canal stenosis or foraminal narrowing.     LUMBAR DISC SPACES:     L1-L2: Motion degraded evaluation. Small right foraminal disc protrusion. No significant spinal canal or foraminal narrowing.     L2-L3: No herniation. No spinal canal or foraminal narrowing. Mild facet arthrosis     L3-L4: Diffuse disc bulge, ligamentum flavum thickening and facet arthrosis. Mild spinal canal narrowing. Mild to moderate left and  minimal right foraminal narrowing.     L4-L5: Grade 1 anterolisthesis, uncovering of the disc and facet arthrosis. Mild spinal canal narrowing. Moderate right and mild left foraminal narrowing.     L5-S1: Small diffuse disc bulge, left foraminal disc protrusion and left greater than right facet arthrosis. No spinal canal narrowing. Mild left foraminal narrowing.     OTHER FINDINGS: Nonspecific prominent endometrial fluid and multiple uterine fibroids measuring up to 5.4 cm. T2 hyperintense right renal lesion, incompletely evaluated however statistically favored to represent cyst.     IMPRESSION:  Motion-degraded examination.     -Grade 1 spondylolisthesis at L4-L5 resulting in moderate right and mild left foraminal narrowing. Otherwise mild spinal canal narrowing at L3-L4 and mild to moderate foraminal narrowing as above.     -Edema within bilateral L5 pedicles, which may represent stress related injury. Left L5-S1 active facet arthrosis and facet effusion.     -Nonspecific endometrial fluid and fibroid uterus measuring up to 5.3 cm, partially evaluated. Correlate with endovaginal ultrasound.     -Incomplete evaluated T2 hyperintense right renal lesion statistically favored to represent a cyst. This can be correlated with renal ultrasound.        The study was marked in EPIC for significant notification.     Workstation performed: [id number]   Narrative    This result has an attachment that is not available.  ` Impression: Moderate degenerative disc disease lumbar spine with  spondylolisthesis at L4-5    2 views of the lumbar spine are reviewed.  The vertebral bodies are of  normal height with no signs of a compression fracture.  Disc heights are  relatively decreased especially in the lower 3 discs.  The thoracolumbar  junction also has significant disc space narrowing but is incompletely  visualized.  There is a spondylolisthesis at L4-5 of about 10%.  Slight  scoliosis is seen on the AP view with marked facet  arthrosis at the lower  lumbar spine.  No acute process is seen.  Exam End: 06/03/22  2:33 PM Last Resulted: 06/03/22  2:36 PM   Received From: [facility]  Result Received: 11/13/23  1:31 PM

## 2024-10-22 ENCOUNTER — HOSPITAL ENCOUNTER (OUTPATIENT)
Dept: RADIOLOGY | Facility: HOSPITAL | Age: 69
Discharge: HOME | End: 2024-10-22
Attending: FAMILY MEDICINE
Payer: COMMERCIAL

## 2024-10-22 DIAGNOSIS — R94.6 ABNORMAL THYROID FUNCTION TEST: ICD-10-CM

## 2024-10-22 DIAGNOSIS — N28.1 RENAL CYST, RIGHT: ICD-10-CM

## 2024-10-22 PROCEDURE — 76770 US EXAM ABDO BACK WALL COMP: CPT

## 2024-10-22 PROCEDURE — 76536 US EXAM OF HEAD AND NECK: CPT

## 2024-10-29 ENCOUNTER — TELEPHONE (OUTPATIENT)
Dept: FAMILY MEDICINE CLINIC | Facility: CLINIC | Age: 69
End: 2024-10-29

## 2024-10-29 NOTE — TELEPHONE ENCOUNTER
Pt called asking about US THYROID and US KIDNEY AND BLADDER WITH PVR results     Please advise, thank you

## 2024-10-30 ENCOUNTER — HOSPITAL ENCOUNTER (OUTPATIENT)
Dept: BONE DENSITY | Facility: CLINIC | Age: 69
Discharge: HOME | End: 2024-10-30
Payer: COMMERCIAL

## 2024-10-30 ENCOUNTER — APPOINTMENT (OUTPATIENT)
Dept: LAB | Facility: CLINIC | Age: 69
End: 2024-10-30
Payer: COMMERCIAL

## 2024-10-30 DIAGNOSIS — Z13.820 SCREENING FOR OSTEOPOROSIS: ICD-10-CM

## 2024-10-30 DIAGNOSIS — E66.812 CLASS 2 SEVERE OBESITY WITH SERIOUS COMORBIDITY AND BODY MASS INDEX (BMI) OF 39.0 TO 39.9 IN ADULT, UNSPECIFIED OBESITY TYPE (HCC): ICD-10-CM

## 2024-10-30 DIAGNOSIS — R94.6 ABNORMAL THYROID FUNCTION TEST: ICD-10-CM

## 2024-10-30 DIAGNOSIS — R73.03 PREDIABETES: ICD-10-CM

## 2024-10-30 DIAGNOSIS — E66.01 CLASS 2 SEVERE OBESITY WITH SERIOUS COMORBIDITY AND BODY MASS INDEX (BMI) OF 39.0 TO 39.9 IN ADULT, UNSPECIFIED OBESITY TYPE (HCC): ICD-10-CM

## 2024-10-30 DIAGNOSIS — E78.2 MIXED HYPERLIPIDEMIA: ICD-10-CM

## 2024-10-30 LAB
ALBUMIN SERPL BCG-MCNC: 4.4 G/DL (ref 3.5–5)
ALP SERPL-CCNC: 73 U/L (ref 34–104)
ALT SERPL W P-5'-P-CCNC: 22 U/L (ref 7–52)
ANION GAP SERPL CALCULATED.3IONS-SCNC: 11 MMOL/L (ref 4–13)
AST SERPL W P-5'-P-CCNC: 22 U/L (ref 13–39)
BILIRUB SERPL-MCNC: 0.39 MG/DL (ref 0.2–1)
BUN SERPL-MCNC: 25 MG/DL (ref 5–25)
CALCIUM SERPL-MCNC: 10 MG/DL (ref 8.4–10.2)
CHLORIDE SERPL-SCNC: 104 MMOL/L (ref 96–108)
CHOLEST SERPL-MCNC: 239 MG/DL
CO2 SERPL-SCNC: 23 MMOL/L (ref 21–32)
CREAT SERPL-MCNC: 0.62 MG/DL (ref 0.6–1.3)
EST. AVERAGE GLUCOSE BLD GHB EST-MCNC: 126 MG/DL
GFR SERPL CREATININE-BSD FRML MDRD: 92 ML/MIN/1.73SQ M
GLUCOSE P FAST SERPL-MCNC: 86 MG/DL (ref 65–99)
HBA1C MFR BLD: 6 %
HDLC SERPL-MCNC: 81 MG/DL
LDLC SERPL CALC-MCNC: 130 MG/DL (ref 0–100)
NONHDLC SERPL-MCNC: 158 MG/DL
POTASSIUM SERPL-SCNC: 4.4 MMOL/L (ref 3.5–5.3)
PROT SERPL-MCNC: 8 G/DL (ref 6.4–8.4)
SODIUM SERPL-SCNC: 138 MMOL/L (ref 135–147)
T4 FREE SERPL-MCNC: 0.92 NG/DL (ref 0.61–1.12)
TRIGL SERPL-MCNC: 140 MG/DL
TSH SERPL DL<=0.05 MIU/L-ACNC: 0.32 UIU/ML (ref 0.45–4.5)

## 2024-10-30 PROCEDURE — 84439 ASSAY OF FREE THYROXINE: CPT

## 2024-10-30 PROCEDURE — 77080 DXA BONE DENSITY AXIAL: CPT

## 2024-10-30 PROCEDURE — 80061 LIPID PANEL: CPT

## 2024-10-30 PROCEDURE — 84443 ASSAY THYROID STIM HORMONE: CPT

## 2024-10-30 PROCEDURE — 80053 COMPREHEN METABOLIC PANEL: CPT

## 2024-10-30 PROCEDURE — 83036 HEMOGLOBIN GLYCOSYLATED A1C: CPT

## 2024-10-30 PROCEDURE — 36415 COLL VENOUS BLD VENIPUNCTURE: CPT

## 2024-11-05 ENCOUNTER — HOSPITAL ENCOUNTER (OUTPATIENT)
Dept: RADIOLOGY | Facility: MEDICAL CENTER | Age: 69
Discharge: HOME | End: 2024-11-05
Payer: COMMERCIAL

## 2024-11-05 VITALS
TEMPERATURE: 97.9 F | OXYGEN SATURATION: 98 % | DIASTOLIC BLOOD PRESSURE: 74 MMHG | HEART RATE: 74 BPM | RESPIRATION RATE: 16 BRPM | SYSTOLIC BLOOD PRESSURE: 118 MMHG

## 2024-11-05 DIAGNOSIS — M46.1 SACROILIITIS (HCC): ICD-10-CM

## 2024-11-05 PROCEDURE — 27096 INJECT SACROILIAC JOINT: CPT | Performed by: PHYSICAL MEDICINE & REHABILITATION

## 2024-11-05 RX ORDER — ROPIVACAINE HYDROCHLORIDE 2 MG/ML
1.5 INJECTION, SOLUTION EPIDURAL; INFILTRATION; PERINEURAL ONCE
Status: COMPLETED | OUTPATIENT
Start: 2024-11-05 | End: 2024-11-05

## 2024-11-05 RX ORDER — METHYLPREDNISOLONE ACETATE 40 MG/ML
40 INJECTION, SUSPENSION INTRA-ARTICULAR; INTRALESIONAL; INTRAMUSCULAR; PARENTERAL; SOFT TISSUE ONCE
Status: COMPLETED | OUTPATIENT
Start: 2024-11-05 | End: 2024-11-05

## 2024-11-05 RX ADMIN — IOHEXOL 0.5 ML: 300 INJECTION, SOLUTION INTRAVENOUS at 14:40

## 2024-11-05 RX ADMIN — METHYLPREDNISOLONE ACETATE 40 MG: 40 INJECTION, SUSPENSION INTRA-ARTICULAR; INTRALESIONAL; INTRAMUSCULAR; PARENTERAL; SOFT TISSUE at 14:40

## 2024-11-05 RX ADMIN — ROPIVACAINE HYDROCHLORIDE 1.5 ML: 2 INJECTION, SOLUTION EPIDURAL; INFILTRATION; PERINEURAL at 14:40

## 2024-11-05 NOTE — H&P
History of Present Illness: The patient is a 68 y.o. female who presents with complaints of right low back pain    Past Medical History:   Diagnosis Date    Chronic right-sided low back pain without sciatica 2022    GERD (gastroesophageal reflux disease)     Hyperlipidemia     Kidney stone     Obstructive sleep apnea-hypopnea syndrome 2022       Past Surgical History:   Procedure Laterality Date     SECTION      X3    COLONOSCOPY      HAND SURGERY Right     tendon repair         Current Outpatient Medications:     Cyanocobalamin (VITAMIN B-12 PO), Take by mouth in the morning, Disp: , Rfl:     gabapentin (NEURONTIN) 100 mg capsule, Take 100 mg by mouth, Disp: , Rfl:     meloxicam (MOBIC) 7.5 mg tablet, TAKE 1 TABLET BY MOUTH EVERY DAY, Disp: 30 tablet, Rfl: 1    methylprednisolone (MEDROL) 4 mg tablet, Medrol dose pack, take as directed, Disp: 21 tablet, Rfl: 0    multivitamin (THERAGRAN) TABS, Take 1 tablet by mouth daily, Disp: , Rfl:     pravastatin (PRAVACHOL) 20 mg tablet, Take 1 tablet (20 mg total) by mouth daily at bedtime, Disp: 90 tablet, Rfl: 2    Semaglutide-Weight Management (WEGOVY) 2.4 MG/0.75ML, Inject 0.75 mL (2.4 mg total) under the skin once a week for 28 days Do not start before 2024., Disp: 3 mL, Rfl: 0    Allergies   Allergen Reactions    Aspirin GI Intolerance       Physical Exam:   Vitals:    24 1429   BP: 117/74   Pulse: 73   Resp: 18   Temp: 97.9 °F (36.6 °C)   SpO2: 99%     General: Awake, Alert, Oriented x 3, Mood and affect appropriate  Respiratory: Respirations even and unlabored  Cardiovascular: Peripheral pulses intact; no edema  Musculoskeletal Exam: right low back pain    ASA Score: 3    Patient/Chart Verification  Patient ID Verified: Verbal  Consents Confirmed: Procedural, To be obtained in the Pre-Procedure area  H&P( within 30 days) Verified: To be obtained in the Procedural area  Allergies Reviewed: Yes  Anticoag/NSAID held?: NA  Currently  on antibiotics?: No  Pregnancy denied?: NA    Assessment:   1. Sacroiliitis (HCC)        Plan: (R) SIJ injection

## 2024-11-05 NOTE — DISCHARGE INSTRUCTIONS
Steroid Joint Injection   WHAT YOU NEED TO KNOW:   A steroid joint injection is a procedure to inject steroid medicine into a joint. Steroid medicine decreases pain and inflammation. The injection may also contain an anesthetic (numbing medicine) to decrease pain. It may be done to treat conditions such as arthritis, gout, or carpal tunnel syndrome. The injections may be given in your knee, ankle, shoulder, elbow, wrist, ankle or sacroiliac joint.    Do not apply heat to any area that is numb. If you have discomfort or soreness at the injection site, you may apply ice today, 20 minutes on and 20 minutes off. Tomorrow you may use ice or warm, moist heat. Do not apply ice or heat directly to the skin.  You may have an increase or change in the discomfort for 36-48 hours after your treatment. Apply ice and continue with any pain medicine you have been prescribed.  Do not do anything strenuous today. You may shower, but no tub baths or hot tubs today. You may resume your normal activities tomorrow, but do not “overdo it”. Resume normal activities slowly when you are feeling better.  If you experience redness, drainage or swelling at the injection site, or if you develop a fever above 100 degrees, please call [facility] at [phone] or go to the Emergency Room.  Continue to take all routine medicines prescribed by your primary care physician unless otherwise instructed by our staff. Most blood thinners should be started again according to your regularly scheduled dosing. If you have any questions, please give our office a call.    As no general anesthesia was used in today's procedure, you should not experience any side effects related to anesthesia.     If you are diabetic, the steroids used in today's injection may temporarily increase your blood sugar levels after the first few days after your injection. Please keep a close eye on your sugars and alert the doctor who manages your diabetes if your  "sugars are significantly high from your baseline or you are symptomatic.     If you have a problem specifically related to your procedure, please call our office at [phone].    Problems not related to your procedure should be directed to your primary care physician.     INSTRUCCIONES PARA EL JOHN POSTERIOR AL PROCEDIMIENTO DE INYECCIÓN EN GUERLINE ARTICULACIÓN    No aplique calor en ninguna área que esté entumecida. Si siente molestias o dolor en el lugar de la inyección, por hoy puede colocar hielo ramon 20 minutos y retirarlo ramon otros 20 minutos. A partir de mañana, podrá utilizar hielo o calor húmedo. No aplique hielo o calor directo sobre la piel.     Puede experimentar un aumento o cambio en el malestar ramon las 36-48 horas posteriores al tratamiento. Aplique hielo y continúe tomando cualquier analgésico que le hayan recetado.    No realice ninguna actividad extenuante, por tioy. Puede ducharse, colt no se bañe en tinas ni en jacuzzis por favian. Puede retomar katja actividades normales mañana, colt \"no se exceda\". Retome katja actividades normales gradualmente a medida que se sienta mejor.    Si nota enrojecimiento, secreción o inflamación en el sitio donde lo inyectaron, o si presenta fiebre superior a 100 grados, llame a [facility] al [phone] o acuda a la meng de urgencias.    Continúe tomando todos los medicamentos de rutina que le haya recetado thomas médico de cabecera, a menos que nuestro personal le indique lo contrario. Debería volver a dakotah la mayoría de los anticoagulantes de acuerdo con la dosis que tiene programada regularmente. Si tiene alguna pregunta, llame a nuestro consultorio.    Si tiene algún problema relacionado específicamente con el procedimiento, llame a nuestro consultorio al [phone]. Si tiene problemas que no estén relacionados con thomas procedimiento, debe comunicarse con thomas médico de cabecera.   "

## 2024-11-06 ENCOUNTER — OFFICE VISIT (OUTPATIENT)
Dept: DENTISTRY | Facility: CLINIC | Age: 69
End: 2024-11-06

## 2024-11-06 VITALS — TEMPERATURE: 98.4 F | HEART RATE: 83 BPM | SYSTOLIC BLOOD PRESSURE: 131 MMHG | DIASTOLIC BLOOD PRESSURE: 61 MMHG

## 2024-11-06 DIAGNOSIS — K08.530 TOOTH FRACTURE WITHOUT LOSS OF RESTORATIVE MATERIAL: Primary | ICD-10-CM

## 2024-11-06 PROCEDURE — D2332 RESIN-BASED COMPOSITE - 3 SURFACES, ANTERIOR: HCPCS

## 2024-11-06 NOTE — PROGRESS NOTES
Composite Filling    Henna Ballard presents for composite filling. PMH reviewed, no changes.    Applied topical benzocaine, administered 1 carp 4% articaine 1:100k epi via buccal infiltration    Prepped tooth #9 ALEJANDRO with 835 denise on high speed.   Isolation with cotton rolls    Etch with 37% H2PO4, rinse, dry. Applied Adhese with 20 second scrub once, gentle air dry and light cured for 10s. Restored with Tetric bulk alejandro shade A3 and light cured.    Refined with finishing burs, Shofu super snap disks, polished with enhance point. Verified occlusion and contacts. Pt left satisfied.    NV: #21 DO  Comp / FMX

## 2024-11-19 ENCOUNTER — TELEPHONE (OUTPATIENT)
Dept: RADIOLOGY | Facility: MEDICAL CENTER | Age: 69
End: 2024-11-19

## 2024-11-19 NOTE — TELEPHONE ENCOUNTER
Patient Reports    90    %     improvement post injection    Pain Level  just about zero   /10      [id number]

## 2024-11-25 DIAGNOSIS — E66.01 CLASS 2 SEVERE OBESITY WITH SERIOUS COMORBIDITY AND BODY MASS INDEX (BMI) OF 39.0 TO 39.9 IN ADULT, UNSPECIFIED OBESITY TYPE (HCC): ICD-10-CM

## 2024-11-25 DIAGNOSIS — E66.812 CLASS 2 SEVERE OBESITY WITH SERIOUS COMORBIDITY AND BODY MASS INDEX (BMI) OF 39.0 TO 39.9 IN ADULT, UNSPECIFIED OBESITY TYPE (HCC): ICD-10-CM

## 2024-11-26 RX ORDER — SEMAGLUTIDE 1.7 MG/.75ML
1.7 INJECTION, SOLUTION SUBCUTANEOUS WEEKLY
Qty: 9 ML | Refills: 0 | Status: SHIPPED | OUTPATIENT
Start: 2024-11-26 | End: 2025-02-24

## 2024-11-26 NOTE — TELEPHONE ENCOUNTER
Pt called into office and is requesting refill for the following medication. Pt stated she picks up her medication every month on the first or second and would like refill to be put in for her to get it then. Please advise.     Semaglutide-Weight Management 1.7 MG/0.75ML

## 2024-12-03 ENCOUNTER — OFFICE VISIT (OUTPATIENT)
Dept: PAIN MEDICINE | Facility: MEDICAL CENTER | Age: 69
End: 2024-12-03
Payer: COMMERCIAL

## 2024-12-03 VITALS
DIASTOLIC BLOOD PRESSURE: 64 MMHG | SYSTOLIC BLOOD PRESSURE: 131 MMHG | HEART RATE: 90 BPM | HEIGHT: 61 IN | BODY MASS INDEX: 35.68 KG/M2 | WEIGHT: 189 LBS

## 2024-12-03 DIAGNOSIS — M54.16 LUMBAR RADICULOPATHY: Primary | ICD-10-CM

## 2024-12-03 DIAGNOSIS — M46.1 SACROILIITIS (HCC): ICD-10-CM

## 2024-12-03 PROCEDURE — 99214 OFFICE O/P EST MOD 30 MIN: CPT

## 2024-12-03 NOTE — PROGRESS NOTES
Assessment:  1. Lumbar radiculopathy    2. Sacroiliitis (HCC)        Plan:  Patient currently reporting 90% improvement in low back/hip pain following recent right SIJ injection.  She is aware this can be repeated on an as-needed basis.  Schedule for right L4-L5 TFESI to address right lower extremity pain.  Complete benefits and risks of this procedure including hyperglycemia, bleeding, infection, local tissue reaction, nerve injury and allergic reaction were discussed at today's visit. The approach was demonstrated using models and literature was provided for home review. The patient was agreeable, verbalized understanding, and wishes to proceed with scheduling the procedure.  Follow-up as needed after injection.    My impressions and treatment recommendations were discussed in detail with the patient who verbalized understanding and had no further questions.  Discharge instructions were provided. I personally saw and examined the patient and I agree with the above discussed plan of care.    Orders Placed This Encounter   Procedures    FL spine and pain procedure     Standing Status:   Future     Expected Date:   12/3/2024     Expiration Date:   12/3/2028     Reason for Exam::   Right L4-L5 TFESI     Anticoagulant hold needed?:   No     No orders of the defined types were placed in this encounter.      History of Present Illness:  Henna Ballard is a 69 y.o. female who presents for a follow up office visit after undergoing right sacroiliac joint injection.  Patient reports 90% improvement in low back pain following this procedure but she unfortunately is continuing to experience pain radiating from the lumbar region into the right lower extremity primarily in an L4 distribution.  This is constant, and typically worse at night.  She describes the quality of her pain as shooting and throbbing.  She admits to diffuse numbness in the same distribution as the pain.  She denies any weakness of the right lower extremity  "associated with this pain complaint. Denies any bowel/bladder incontinence or saddle anesthesia.  She states she has no history of lumbar surgery. Patient states her low back pain was more of a recent issue however she has been experiencing this \"sciatic\" type pain for at least 5 years.     Please note that the patient is primarily French speaking and required interpretive services for this office visit. Interpretor [id number] was available throughout the entire office visit and interpreted with the patient in agreement and verbalizing understanding.    I have personally reviewed and/or updated the patient's past medical history, past surgical history, family history, social history, current medications, allergies, and vital signs today.     Review of Systems   Respiratory:  Negative for shortness of breath.    Cardiovascular:  Negative for chest pain.   Gastrointestinal:  Negative for constipation, diarrhea, nausea and vomiting.   Musculoskeletal:  Positive for back pain and myalgias. Negative for arthralgias, gait problem and joint swelling.   Skin:  Negative for rash.   Neurological:  Negative for dizziness, seizures and weakness.   All other systems reviewed and are negative.      Patient Active Problem List   Diagnosis    Right hip pain    Other dysphagia    GERD (gastroesophageal reflux disease)    Chronic right-sided low back pain without sciatica    Prediabetes    Mixed hyperlipidemia    Obstructive sleep apnea-hypopnea syndrome    Difficulty using continuous positive airway pressure (CPAP) device    PLMD (periodic limb movement disorder)    Abnormal thyroid function test    Joint pain    Trigger index finger of left hand    Bilateral hand numbness    Class 2 severe obesity with serious comorbidity and body mass index (BMI) of 39.0 to 39.9 in adult (HCC)    Sciatica    Headache    BMI 38.0-38.9,adult    Blurry vision    Other chest pain    Chronic bilateral low back pain with left-sided sciatica    Sacroiliitis " (HCC)       Past Medical History:   Diagnosis Date    Chronic right-sided low back pain without sciatica 2022    GERD (gastroesophageal reflux disease)     Hyperlipidemia     Kidney stone     Obstructive sleep apnea-hypopnea syndrome 2022       Past Surgical History:   Procedure Laterality Date     SECTION      X3    COLONOSCOPY      HAND SURGERY Right     tendon repair       Family History   Problem Relation Age of Onset    No Known Problems Mother         is 101 yrs old    Cancer Father     Stomach cancer Father         mets; unknown age- at 43    Lung cancer Brother     No Known Problems Son     No Known Problems Son     No Known Problems Daughter     No Known Problems Maternal Grandmother     No Known Problems Maternal Grandfather     No Known Problems Paternal Grandmother     No Known Problems Paternal Grandfather     No Known Problems Maternal Aunt     No Known Problems Maternal Aunt     No Known Problems Maternal Aunt     No Known Problems Maternal Aunt     No Known Problems Maternal Aunt     No Known Problems Maternal Aunt     No Known Problems Maternal Aunt     Breast cancer Neg Hx     Colon cancer Neg Hx     Endometrial cancer Neg Hx     Ovarian cancer Neg Hx        Social History     Occupational History    Not on file   Tobacco Use    Smoking status: Never    Smokeless tobacco: Never   Vaping Use    Vaping status: Never Used   Substance and Sexual Activity    Alcohol use: Never    Drug use: Never    Sexual activity: Yes     Partners: Male       Current Outpatient Medications on File Prior to Visit   Medication Sig    Cyanocobalamin (VITAMIN B-12 PO) Take by mouth in the morning    gabapentin (NEURONTIN) 100 mg capsule Take 100 mg by mouth    multivitamin (THERAGRAN) TABS Take 1 tablet by mouth daily    Semaglutide-Weight Management (Wegovy) 1.7 MG/0.75ML Inject 0.75 mL (1.7 mg total) under the skin once a week    meloxicam (MOBIC) 7.5 mg tablet TAKE 1 TABLET BY MOUTH EVERY DAY  "(Patient not taking: Reported on 12/3/2024)    methylprednisolone (MEDROL) 4 mg tablet Medrol dose pack, take as directed (Patient not taking: Reported on 12/3/2024)    pravastatin (PRAVACHOL) 20 mg tablet Take 1 tablet (20 mg total) by mouth daily at bedtime (Patient not taking: Reported on 12/3/2024)     No current facility-administered medications on file prior to visit.       Allergies   Allergen Reactions    Aspirin GI Intolerance       Physical Exam:    /64   Pulse 90   Ht 5' 1\" (1.549 m)   Wt 85.7 kg (189 lb)   BMI 35.71 kg/m²     Constitutional:normal, well developed, well nourished, alert, in no distress and non-toxic and no overt pain behavior.  Eyes:anicteric  HEENT:grossly intact  Neck:supple, symmetric, trachea midline and no masses   Pulmonary:even and unlabored  Cardiovascular:No edema or pitting edema present  Skin:Normal without rashes or lesions and well hydrated  Psychiatric:Mood and affect appropriate  Neurologic:Cranial Nerves II-XII grossly intact  Musculoskeletal:normal lower extremity strength bilaterally.  Positive straight leg raise on the right from a seated position.  Patellar reflexes diminished bilaterally 1+.     "

## 2024-12-09 ENCOUNTER — OFFICE VISIT (OUTPATIENT)
Dept: DENTISTRY | Facility: CLINIC | Age: 69
End: 2024-12-09

## 2024-12-09 VITALS — SYSTOLIC BLOOD PRESSURE: 111 MMHG | TEMPERATURE: 97.8 F | HEART RATE: 81 BPM | DIASTOLIC BLOOD PRESSURE: 71 MMHG

## 2024-12-09 DIAGNOSIS — K08.530 TOOTH FRACTURE WITHOUT LOSS OF RESTORATIVE MATERIAL: Primary | ICD-10-CM

## 2024-12-09 PROCEDURE — D2392 RESIN-BASED COMPOSITE - 2 SURFACES, POSTERIOR: HCPCS

## 2024-12-09 PROCEDURE — D0220 INTRAORAL - PERIAPICAL FIRST RADIOGRAPHIC IMAGE: HCPCS

## 2024-12-12 NOTE — DENTAL PROCEDURE DETAILS
Composite Filling    Henna Ballard presents for composite filling.  PMH reviewed, no changes.    #20 PA appears WNL.    Discussed with patient need for RCT if pulp exposure occurs or in future if pulp is inflamed. Pt understands and consents.    Applied topical benzocaine, administered 1 carp 4% articaine 1:100k epi via buccal infiltration    Prepped tooth #20 DO with 835 denise on high speed. Caries removed with round carbide on slow speed.     CHACE Marin, completed the restoration per the following protocol:    Isolation with cotton rolls and dri-angles    Etch with 37% H2PO4, rinse, dry. Applied Adhese with 20 second scrub once, gentle air dry and light cured for 10s. Restored with Tetric bulk alejandro shade A2 and light cured.    Refined with finishing burs, polished with enhance point. Verified occlusion and contacts. Pt left satisfied.    NV: comp / FMX

## 2024-12-12 NOTE — PROGRESS NOTES
Procedure Details  20 DO  - RESIN-BASED COMPOSITE - 2 SURFACES, POSTERIOR    20  - INTRAORAL - PERIAPICAL FIRST RADIOGRAPHIC IMAGE  Composite Filling    Henna Ballard presents for composite filling.  PMH reviewed, no changes.    #20 PA appears WNL.    Discussed with patient need for RCT if pulp exposure occurs or in future if pulp is inflamed. Pt understands and consents.    Applied topical benzocaine, administered 1 carp 4% articaine 1:100k epi via buccal infiltration    Prepped tooth #20 DO with 835 denise on high speed. Caries removed with round carbide on slow speed.     CHACE Marin, completed the restoration per the following protocol:    Isolation with cotton rolls and dri-angles    Etch with 37% H2PO4, rinse, dry. Applied Adhese with 20 second scrub once, gentle air dry and light cured for 10s. Restored with Tetric bulk alejandro shade A2 and light cured.    Refined with finishing burs, polished with enhance point. Verified occlusion and contacts. Pt left satisfied.    NV: comp / FMX

## 2024-12-16 ENCOUNTER — OFFICE VISIT (OUTPATIENT)
Dept: DENTISTRY | Facility: CLINIC | Age: 69
End: 2024-12-16

## 2024-12-16 VITALS — DIASTOLIC BLOOD PRESSURE: 70 MMHG | HEART RATE: 76 BPM | TEMPERATURE: 98.4 F | SYSTOLIC BLOOD PRESSURE: 110 MMHG

## 2024-12-16 DIAGNOSIS — Z01.20 ENCOUNTER FOR DENTAL EXAMINATION: Primary | ICD-10-CM

## 2024-12-16 PROCEDURE — D0150 COMPREHENSIVE ORAL EVALUATION - NEW OR ESTABLISHED PATIENT: HCPCS

## 2024-12-16 PROCEDURE — D0210 INTRAORAL - COMPLETE SERIES OF RADIOGRAPHIC IMAGES: HCPCS

## 2024-12-16 NOTE — DENTAL PROCEDURE DETAILS
COMP EXAM, FMX, PROBE EXAM   REVIEWED MED HX: meds, allergies, health changes reviewed in EPIC  CHIEF CONCERN:     Here for exam  PAIN SCALE:  1  ASA CLASS:  ASA 2 - Patient with mild systemic disease with no functional limitations  PLAQUE:  mild  CALCULUS: Localized  BLEEDING:  light  STAIN :  None  PERIO: gingivitis    Visual and Tactile Intraoral/ Extraoral evaluation: Oral and Oropharyngeal cancer evaluation. No findings     Dr. Schaffer -  Reviewed with patient clinical and radiographic findings and patient verbalized understanding. All questions and concerns addressed.     REFERRALS: None    FINDINGS:     #10 PARL, history of RCT   #30 DL cusp fracture       NEXT VISIT:   1)prophy  Once preauth, #10 RCT retreat, #30 crown    Last FMX : 12/16/24

## 2024-12-16 NOTE — PROGRESS NOTES
Procedure Details   - COMPREHENSIVE ORAL EVALUATION - NEW OR ESTABLISHED PATIENT     COMP EXAM, FMX, PROBE EXAM   REVIEWED MED HX: meds, allergies, health changes reviewed in EPIC  CHIEF CONCERN:     Here for exam  PAIN SCALE:  1  ASA CLASS:  ASA 2 - Patient with mild systemic disease with no functional limitations  PLAQUE:  mild  CALCULUS: Localized  BLEEDING:  light  STAIN :  None  PERIO: gingivitis    Visual and Tactile Intraoral/ Extraoral evaluation: Oral and Oropharyngeal cancer evaluation. No findings     Dr. Schaffer -  Reviewed with patient clinical and radiographic findings and patient verbalized understanding. All questions and concerns addressed.     REFERRALS: None    FINDINGS:     #10 PARL, history of RCT   #30 DL cusp fracture       NEXT VISIT:   1)prophy  Once preauth, #10 RCT retreat, #30 crown    Last FMX : 12/16/24   - INTRAORAL - COMPLETE SERIES OF RADIOGRAPHIC IMAGES

## 2025-01-06 ENCOUNTER — TELEPHONE (OUTPATIENT)
Dept: DENTISTRY | Facility: CLINIC | Age: 70
End: 2025-01-06

## 2025-01-10 ENCOUNTER — HOSPITAL ENCOUNTER (OUTPATIENT)
Dept: RADIOLOGY | Facility: MEDICAL CENTER | Age: 70
Discharge: HOME | End: 2025-01-10
Admitting: PHYSICAL MEDICINE & REHABILITATION
Payer: COMMERCIAL

## 2025-01-10 VITALS
TEMPERATURE: 97.8 F | HEART RATE: 71 BPM | RESPIRATION RATE: 16 BRPM | SYSTOLIC BLOOD PRESSURE: 106 MMHG | DIASTOLIC BLOOD PRESSURE: 67 MMHG | OXYGEN SATURATION: 96 %

## 2025-01-10 DIAGNOSIS — M54.16 LUMBAR RADICULOPATHY: ICD-10-CM

## 2025-01-10 PROCEDURE — 64483 NJX AA&/STRD TFRM EPI L/S 1: CPT | Performed by: PHYSICAL MEDICINE & REHABILITATION

## 2025-01-10 RX ORDER — PAPAVERINE HCL 150 MG
10 CAPSULE, EXTENDED RELEASE ORAL ONCE
Status: COMPLETED | OUTPATIENT
Start: 2025-01-10 | End: 2025-01-10

## 2025-01-10 RX ADMIN — DEXAMETHASONE SODIUM PHOSPHATE 10 MG: 10 INJECTION INTRAMUSCULAR; INTRAVENOUS at 14:28

## 2025-01-10 RX ADMIN — IOHEXOL 2 ML: 300 INJECTION, SOLUTION INTRAVENOUS at 14:28

## 2025-01-10 NOTE — DISCHARGE INSTRUCTIONS
Epidural Steroid Injection   WHAT YOU NEED TO KNOW:   An epidural steroid injection (PETE) is a procedure to inject steroid medicine into the epidural space. The epidural space is between your spinal cord and vertebrae. Steroids reduce inflammation and fluid buildup in your spine that may be causing pain. You may be given pain medicine along with the steroids.          ACTIVITY  Do not drive or operate machinery today.  No strenuous activity today - bending, lifting, etc.  You may resume normal activites starting tomorrow - start slowly and as tolerated.  You may shower today, but no tub baths or hot tubs.  You may have numbness for several hours from the local anesthetic. Please use caution and common sense, especially with weight-bearing activities.    CARE OF THE INJECTION SITE  If you have soreness or pain, apply ice to the area today (20 minutes on/20 minutes off).  Starting tomorrow, you may use warm, moist heat or ice if needed.  You may have an increase or change in your discomfort for 36-48 hours after your treatment.  Apply ice and continue with any pain medication you have been prescribed.  Notify [facility] if you have any of the following: redness, drainage, swelling, headache, stiff neck or fever above 100°F.    SPECIAL INSTRUCTIONS  Our office will contact you in approximately 14 days for a progress report.    MEDICATIONS  Continue to take all routine medications.  Our office may have instructed you to hold some medications.    As no general anesthesia was used in today's procedure, you should not experience any side effects related to anesthesia.     If you are diabetic, the steroids used in today's injection may temporarily increase your blood sugar levels after the first few days after your injection. Please keep a close eye on your sugars and alert the doctor who manages your diabetes if your sugars are significantly high from your baseline or you are symptomatic.     If you have a  problem specifically related to your procedure, please call our office at [phone].  Problems not related to your procedure should be directed to your primary care physician.  INSTRUCCIONES PARA EL JOHN DE GUERLINE INYECCIÓN EPIDURAL DE ESTEROIDES    ACTIVIDAD   No conduzca ni opere maquinarias por hoy.   No realice actividades extenuantes por hoy, flavio agacharse, levantar objetos, etc.   Puede retomar katja actividades normales desde mañana. Comience progresivamente y en la medida que lo tolere.   Puede ducharse, colt no se bañe en tinas ni en jacuzzis por hoy.   Puede sentir entumecimiento ramon varias horas debido a la anestesia local. Sea precavido y use el sentido común, en especial con las actividades que implican la carga de peso.    CUIDADO DEL ÁREA DE APLICACIÓN DE LA INYECCIÓN   Si siente molestias o dolor, aplique hielo en el área por favian (colóquelo ramon 20 minutos y retírelo ramon otros 20 minutos).   A partir de mañana, podrá aplicar calor húmedo o hielo, si lo necesita.   Puede experimentar un aumento o cambio en el malestar ramon las 36-48 horas posteriores al tratamiento. Aplique hielo y continúe tomando cualquier analgésico que le hayan recetado.   Informe a [facility] si se presenta alguno de estos síntomas: enrojecimiento, secreción, inflamación, dolor de keven, rigidez de haley o fiebre superior a 100 °F.    INSTRUCCIONES ESPECIALES  Se pondrán en contacto de nuestro consultorio con usted en aproximadamente 14 días para pedir un informe de progreso.    MEDICAMENTOS   Continúe tomando todos katja medicamentos de rutina.   Es posible que en nuestro consultorio le hayan indicado que deje de d[place]otah algunos medicamentos.   Puede volver a tomarlos el:              Si tiene algún problema relacionado específicamente con el procedimiento, llame a nuestro consultorio al [phone]. Si tiene problemas que no están relacionados con thomas procedimiento, debe comunicarse con thomas médico  juventino vasquez.

## 2025-01-10 NOTE — H&P
History of Present Illness: The patient is a 69 y.o. female who presents with complaints of right back and leg pain    Past Medical History:   Diagnosis Date    Chronic right-sided low back pain without sciatica 2022    GERD (gastroesophageal reflux disease)     Hyperlipidemia     Kidney stone     Obstructive sleep apnea-hypopnea syndrome 2022       Past Surgical History:   Procedure Laterality Date     SECTION      X3    COLONOSCOPY      HAND SURGERY Right     tendon repair         Current Outpatient Medications:     Cyanocobalamin (VITAMIN B-12 PO), Take by mouth in the morning, Disp: , Rfl:     gabapentin (NEURONTIN) 100 mg capsule, Take 100 mg by mouth, Disp: , Rfl:     meloxicam (MOBIC) 7.5 mg tablet, TAKE 1 TABLET BY MOUTH EVERY DAY (Patient not taking: Reported on 12/3/2024), Disp: 30 tablet, Rfl: 1    methylprednisolone (MEDROL) 4 mg tablet, Medrol dose pack, take as directed (Patient not taking: Reported on 12/3/2024), Disp: 21 tablet, Rfl: 0    multivitamin (THERAGRAN) TABS, Take 1 tablet by mouth daily, Disp: , Rfl:     pravastatin (PRAVACHOL) 20 mg tablet, Take 1 tablet (20 mg total) by mouth daily at bedtime (Patient not taking: Reported on 12/3/2024), Disp: 90 tablet, Rfl: 2    Semaglutide-Weight Management (Wegovy) 1.7 MG/0.75ML, Inject 0.75 mL (1.7 mg total) under the skin once a week, Disp: 9 mL, Rfl: 0    Allergies   Allergen Reactions    Aspirin GI Intolerance       Physical Exam:   Vitals:    01/10/25 1405   BP: 108/72   Pulse: 76   Resp: 20   Temp: 97.8 °F (36.6 °C)   SpO2: 96%     General: Awake, Alert, Oriented x 3, Mood and affect appropriate  Respiratory: Respirations even and unlabored  Cardiovascular: Peripheral pulses intact; no edema  Musculoskeletal Exam: right back and leg pain    ASA Score: 3    Patient/Chart Verification  Patient ID Verified: Verbal  ID Band Applied: No  Consents Confirmed: Procedural, To be obtained in the Pre-Procedure area  H&P( within 30 days)  Verified: To be obtained in the Procedural area  Interval H&P(within 24 hr) Complete (required for Outpatients and Surgery Admit only): To be obtained in the Procedural area  Allergies Reviewed: Yes  Anticoag/NSAID held?: NA  Currently on antibiotics?: No    Assessment:   1. Lumbar radiculopathy        Plan: Right L4-L5 TFESI

## 2025-01-13 ENCOUNTER — OFFICE VISIT (OUTPATIENT)
Dept: FAMILY MEDICINE CLINIC | Facility: CLINIC | Age: 70
End: 2025-01-13

## 2025-01-13 VITALS
TEMPERATURE: 97.8 F | BODY MASS INDEX: 36.52 KG/M2 | DIASTOLIC BLOOD PRESSURE: 72 MMHG | HEART RATE: 78 BPM | OXYGEN SATURATION: 97 % | WEIGHT: 186 LBS | RESPIRATION RATE: 18 BRPM | HEIGHT: 60 IN | SYSTOLIC BLOOD PRESSURE: 116 MMHG

## 2025-01-13 DIAGNOSIS — M54.42 CHRONIC BILATERAL LOW BACK PAIN WITH LEFT-SIDED SCIATICA: ICD-10-CM

## 2025-01-13 DIAGNOSIS — E78.2 MIXED HYPERLIPIDEMIA: ICD-10-CM

## 2025-01-13 DIAGNOSIS — N39.3 STRESS INCONTINENCE: Primary | ICD-10-CM

## 2025-01-13 DIAGNOSIS — R73.03 PREDIABETES: ICD-10-CM

## 2025-01-13 DIAGNOSIS — R94.6 ABNORMAL THYROID FUNCTION TEST: ICD-10-CM

## 2025-01-13 DIAGNOSIS — R10.2 PELVIC PAIN: ICD-10-CM

## 2025-01-13 DIAGNOSIS — E66.01 CLASS 2 SEVERE OBESITY WITH SERIOUS COMORBIDITY AND BODY MASS INDEX (BMI) OF 39.0 TO 39.9 IN ADULT, UNSPECIFIED OBESITY TYPE (HCC): ICD-10-CM

## 2025-01-13 DIAGNOSIS — E66.812 CLASS 2 SEVERE OBESITY WITH SERIOUS COMORBIDITY AND BODY MASS INDEX (BMI) OF 39.0 TO 39.9 IN ADULT, UNSPECIFIED OBESITY TYPE (HCC): ICD-10-CM

## 2025-01-13 DIAGNOSIS — G89.29 CHRONIC BILATERAL LOW BACK PAIN WITH LEFT-SIDED SCIATICA: ICD-10-CM

## 2025-01-13 PROCEDURE — 99214 OFFICE O/P EST MOD 30 MIN: CPT | Performed by: FAMILY MEDICINE

## 2025-01-13 RX ORDER — SEMAGLUTIDE 1.7 MG/.75ML
1.7 INJECTION, SOLUTION SUBCUTANEOUS WEEKLY
Qty: 9 ML | Refills: 1 | Status: SHIPPED | OUTPATIENT
Start: 2025-01-13 | End: 2025-06-30

## 2025-01-13 RX ORDER — GABAPENTIN 100 MG/1
100 CAPSULE ORAL DAILY
Qty: 30 CAPSULE | Refills: 5 | Status: SHIPPED | OUTPATIENT
Start: 2025-01-13

## 2025-01-13 NOTE — ASSESSMENT & PLAN NOTE
Orders:  •  Lipid panel; Future  •  Hemoglobin A1C; Future  •  Comprehensive metabolic panel; Future

## 2025-01-13 NOTE — ASSESSMENT & PLAN NOTE
Body mass index is 36.33 kg/m².  Weight loss of 36 pounds  Continue Wegovy 1.7 mg weekly.    Orders:  •  Semaglutide-Weight Management (Wegovy) 1.7 MG/0.75ML; Inject 0.75 mL (1.7 mg total) under the skin once a week  •  TSH + Free T4; Future  •  Lipid panel; Future  •  Hemoglobin A1C; Future  •  Comprehensive metabolic panel; Future

## 2025-01-13 NOTE — PROGRESS NOTES
Name: Henna Ballard      : 1955      MRN: [id number]  Encounter Provider: Pina Martins MD  Encounter Date: 2025   Encounter department: [facility]  :  Assessment & Plan  Stress incontinence  + stress incontinence  Refer to Gyn to evaluate for uterine/vaginal prolapse  Advised Kegel exercises  Orders:  •  Ambulatory Referral to Obstetrics / Gynecology; Future    Pelvic pain  Pelvic pain, possible radiation from chronic back pain  Orders:  •  Ambulatory Referral to Obstetrics / Gynecology; Future    Class 2 severe obesity with serious comorbidity and body mass index (BMI) of 39.0 to 39.9 in adult, unspecified obesity type (HCC)  Body mass index is 36.33 kg/m².  Weight loss of 36 pounds  Continue Wegovy 1.7 mg weekly.    Orders:  •  Semaglutide-Weight Management (Wegovy) 1.7 MG/0.75ML; Inject 0.75 mL (1.7 mg total) under the skin once a week  •  TSH + Free T4; Future  •  Lipid panel; Future  •  Hemoglobin A1C; Future  •  Comprehensive metabolic panel; Future    Chronic bilateral low back pain with left-sided sciatica  S/p   Orders:  •  gabapentin (NEURONTIN) 100 mg capsule; Take 1 capsule (100 mg total) by mouth daily    Mixed hyperlipidemia  Lab Results   Component Value Date/Time    CHOLESTEROL 239 (H) 10/30/2024 09:05 AM    TRIG 140 10/30/2024 09:05 AM    HDL 81 10/30/2024 09:05 AM    LDLCALC 130 (H) 10/30/2024 09:05 AM     Repeat Lipid Panel  Continue Pravastatin 20 mg  Low Fat Diet, regular Exercise    Orders:  •  Lipid panel; Future  •  Comprehensive metabolic panel; Future    Prediabetes    Orders:  •  Lipid panel; Future  •  Hemoglobin A1C; Future  •  Comprehensive metabolic panel; Future    Abnormal thyroid function test    Orders:  •  TSH + Free T4; Future  •  Comprehensive metabolic panel; Future           History of Present Illness     HPI  Henna Ballard is a 69 y.o. female  has a past medical history of Chronic right-sided low back pain without  sciatica, GERD (gastroesophageal reflux disease), Hyperlipidemia, Kidney stone, and Obstructive sleep apnea-hypopnea syndrome. who presented to the office today to follow up for her chronic conditions.  Pt has lost significant amount of weight, greater than 30 pounds on the Wegovy.  She states is feeling better, is s/p inj to her lower back which has helped her chronic pain.        The following portions of the patient's history were reviewed and updated as appropriate: allergies, current medications, past family history, past medical history, past social history, past surgical history and problem list.    Review of Systems   Constitutional:  Negative for chills, fatigue and fever.   HENT:  Negative for congestion, rhinorrhea and sore throat.    Respiratory:  Negative for cough and shortness of breath.    Cardiovascular:  Negative for chest pain.   Gastrointestinal:  Negative for diarrhea, nausea and vomiting.   Musculoskeletal:  Positive for arthralgias and back pain.   Skin:  Negative for rash.   Neurological:  Negative for dizziness and headaches.       Objective   /72 (BP Location: Left arm, Patient Position: Sitting, Cuff Size: Large)   Pulse 78   Temp 97.8 °F (36.6 °C) (Temporal)   Resp 18   Ht 5' (1.524 m)   Wt 84.4 kg (186 lb)   SpO2 97%   BMI 36.33 kg/m²      Physical Exam  Vitals and nursing note reviewed.   Constitutional:       General: She is not in acute distress.     Appearance: She is well-developed. She is obese.   HENT:      Head: Normocephalic and atraumatic.   Eyes:      Conjunctiva/sclera: Conjunctivae normal.   Cardiovascular:      Rate and Rhythm: Normal rate and regular rhythm.      Heart sounds: No murmur heard.  Pulmonary:      Effort: Pulmonary effort is normal. No respiratory distress.      Breath sounds: Normal breath sounds.   Abdominal:      Palpations: Abdomen is soft.      Tenderness: There is no abdominal tenderness.   Musculoskeletal:         General: No swelling.       Cervical back: Neck supple.   Skin:     General: Skin is warm and dry.      Capillary Refill: Capillary refill takes less than 2 seconds.   Neurological:      Mental Status: She is alert and oriented to person, place, and time.   Psychiatric:         Mood and Affect: Mood normal.         Behavior: Behavior normal.

## 2025-01-13 NOTE — ASSESSMENT & PLAN NOTE
Lab Results   Component Value Date/Time    CHOLESTEROL 239 (H) 10/30/2024 09:05 AM    TRIG 140 10/30/2024 09:05 AM    HDL 81 10/30/2024 09:05 AM    LDLCALC 130 (H) 10/30/2024 09:05 AM     Repeat Lipid Panel  Continue Pravastatin 20 mg  Low Fat Diet, regular Exercise    Orders:  •  Lipid panel; Future  •  Comprehensive metabolic panel; Future

## 2025-01-13 NOTE — ASSESSMENT & PLAN NOTE
S/p   Orders:  •  gabapentin (NEURONTIN) 100 mg capsule; Take 1 capsule (100 mg total) by mouth daily

## 2025-01-24 ENCOUNTER — TELEPHONE (OUTPATIENT)
Dept: PAIN MEDICINE | Facility: CLINIC | Age: 70
End: 2025-01-24

## 2025-01-28 NOTE — TELEPHONE ENCOUNTER
Patient reports 50% improvement post inj  Pain level 7-10/10 (left leg)  Per patient, has a lot pain left side more than the right     #: [id number]

## 2025-02-11 ENCOUNTER — OFFICE VISIT (OUTPATIENT)
Dept: PAIN MEDICINE | Facility: MEDICAL CENTER | Age: 70
End: 2025-02-11

## 2025-02-11 VITALS — WEIGHT: 184 LBS | BODY MASS INDEX: 36.12 KG/M2 | HEIGHT: 60 IN

## 2025-02-11 DIAGNOSIS — G89.29 CHRONIC BILATERAL LOW BACK PAIN WITH LEFT-SIDED SCIATICA: ICD-10-CM

## 2025-02-11 DIAGNOSIS — M54.42 CHRONIC BILATERAL LOW BACK PAIN WITH LEFT-SIDED SCIATICA: ICD-10-CM

## 2025-02-11 DIAGNOSIS — M46.1 SACROILIITIS (HCC): ICD-10-CM

## 2025-02-11 DIAGNOSIS — G89.29 CHRONIC BILATERAL LOW BACK PAIN WITHOUT SCIATICA: Primary | ICD-10-CM

## 2025-02-11 DIAGNOSIS — M54.50 CHRONIC BILATERAL LOW BACK PAIN WITHOUT SCIATICA: Primary | ICD-10-CM

## 2025-02-11 DIAGNOSIS — M54.16 LUMBAR RADICULOPATHY: ICD-10-CM

## 2025-02-11 RX ORDER — GABAPENTIN 100 MG/1
CAPSULE ORAL
Qty: 90 CAPSULE | Refills: 1 | Status: SHIPPED | OUTPATIENT
Start: 2025-02-11

## 2025-02-11 NOTE — PROGRESS NOTES
Assessment:  1. Chronic bilateral low back pain without sciatica    2. Chronic bilateral low back pain with left-sided sciatica    3. Lumbar radiculopathy    4. Sacroiliitis (HCC)        Plan:   # [id number] used for this visit.  While the patient was in the office today, I did have a thorough conversation regarding their chronic pain syndrome, medication management, and treatment plan options.  Patient is being seen for a follow-up visit.  She recently underwent a right sided L4-5 transforaminal epidural steroid injection on 1/10/2025.  She is reporting almost complete resolution of her right leg pain.  She previously underwent a right sacroiliac joint injection which provided her with up to 90% improvement.  Her biggest complaint today is pain that radiates down the lateral aspect of her left leg and then over the anterior aspect of her knee to the shin area.  Also experiencing some increased right-sided low back pain again.    Her PCP has prescribed gabapentin 100 mg daily.  Patient tells me that she only uses it as needed.  I advised patient that she needs to take gabapentin on a regular basis and will most likely need to be titrated to a therapeutic dose.  Will increase gabapentin to 100 mg 3 times daily.  A new prescription was sent to her pharmacy.    Patient denies ever trying physical therapy.  Will start physical therapy for lumbar core strengthening/stretching.  I provided her with a referral and a list of local facilities.    Follow-up in 6 weeks to gauge response to gabapentin and physical therapy.    My impressions and treatment recommendations were discussed in detail with the patient who verbalized understanding and had no further questions.  Discharge instructions were provided. I personally saw and examined the patient and I agree with the above discussed plan of care.    Orders Placed This Encounter   Procedures    Ambulatory Referral to Physical Therapy     Standing Status:   Future      Expiration Date:   2026     Referral Priority:   Routine     Referral Type:   Physical Therapy     Referral Reason:   Specialty Services Required     Requested Specialty:   Physical Therapy     Number of Visits Requested:   1     Expiration Date:   2026     New Medications Ordered This Visit   Medications    gabapentin (NEURONTIN) 100 mg capsule     Si PO QHS x 1 day, then 1 PO BID x 1 day, then 1 PO TID     Dispense:  90 capsule     Refill:  1       History of Present Illness:  Henna Ballard is a 69 y.o. female who presents for a follow up office visit in regards to Back Pain.   The patient’s current symptoms include complaints of right-sided low back pain new pain radiating down the lateral left thigh over the anterior left knee and left shin.  The left-sided leg pain has been present for about 2 weeks with no inciting event.  She is reporting almost complete resolution of her right low back pain status post recent right sided L4-5 transforaminal epidural steroid injection on 1/10/2025.    I have personally reviewed and/or updated the patient's past medical history, past surgical history, family history, social history, current medications, allergies, and vital signs today.     Review of Systems   Musculoskeletal:  Positive for arthralgias, back pain, gait problem and myalgias.       Patient Active Problem List   Diagnosis    Right hip pain    Other dysphagia    GERD (gastroesophageal reflux disease)    Chronic right-sided low back pain without sciatica    Prediabetes    Mixed hyperlipidemia    Obstructive sleep apnea-hypopnea syndrome    Difficulty using continuous positive airway pressure (CPAP) device    PLMD (periodic limb movement disorder)    Abnormal thyroid function test    Joint pain    Trigger index finger of left hand    Bilateral hand numbness    Class 2 severe obesity with serious comorbidity and body mass index (BMI) of 39.0 to 39.9 in adult (HCC)    Sciatica    Headache    BMI  38.0-38.9,adult    Blurry vision    Other chest pain    Chronic bilateral low back pain without sciatica    Sacroiliitis (HCC)    Lumbar radiculopathy       Past Medical History:   Diagnosis Date    Chronic right-sided low back pain without sciatica 2022    GERD (gastroesophageal reflux disease)     Hyperlipidemia     Kidney stone     Obstructive sleep apnea-hypopnea syndrome 2022       Past Surgical History:   Procedure Laterality Date     SECTION      X3    COLONOSCOPY      HAND SURGERY Right     tendon repair       Family History   Problem Relation Age of Onset    No Known Problems Mother         is 101 yrs old    Cancer Father     Stomach cancer Father         mets; unknown age- at 43    Lung cancer Brother     No Known Problems Son     No Known Problems Son     No Known Problems Daughter     No Known Problems Maternal Grandmother     No Known Problems Maternal Grandfather     No Known Problems Paternal Grandmother     No Known Problems Paternal Grandfather     No Known Problems Maternal Aunt     No Known Problems Maternal Aunt     No Known Problems Maternal Aunt     No Known Problems Maternal Aunt     No Known Problems Maternal Aunt     No Known Problems Maternal Aunt     No Known Problems Maternal Aunt     Breast cancer Neg Hx     Colon cancer Neg Hx     Endometrial cancer Neg Hx     Ovarian cancer Neg Hx        Social History     Occupational History    Not on file   Tobacco Use    Smoking status: Never     Passive exposure: Never    Smokeless tobacco: Never   Vaping Use    Vaping status: Never Used   Substance and Sexual Activity    Alcohol use: Never    Drug use: Never    Sexual activity: Yes     Partners: Male       Current Outpatient Medications on File Prior to Visit   Medication Sig    Semaglutide-Weight Management (Wegovy) 1.7 MG/0.75ML Inject 0.75 mL (1.7 mg total) under the skin once a week    Cyanocobalamin (VITAMIN B-12 PO) Take by mouth in the morning    meloxicam (MOBIC)  7.5 mg tablet TAKE 1 TABLET BY MOUTH EVERY DAY (Patient not taking: Reported on 1/13/2025)    multivitamin (THERAGRAN) TABS Take 1 tablet by mouth daily    pravastatin (PRAVACHOL) 20 mg tablet Take 1 tablet (20 mg total) by mouth daily at bedtime    [DISCONTINUED] gabapentin (NEURONTIN) 100 mg capsule Take 1 capsule (100 mg total) by mouth daily     No current facility-administered medications on file prior to visit.       Allergies   Allergen Reactions    Aspirin GI Intolerance       Physical Exam:    Ht 5' (1.524 m)   Wt 83.5 kg (184 lb) Comment: stated  BMI 35.94 kg/m²     Constitutional:normal, well developed, well nourished, alert, in no distress and non-toxic and no overt pain behavior.  Eyes:anicteric  HEENT:grossly intact  Neck:supple, symmetric, trachea midline and no masses   Pulmonary:even and unlabored  Cardiovascular:No edema or pitting edema present  Skin:Normal without rashes or lesions and well hydrated  Psychiatric:Mood and affect appropriate  Neurologic:Cranial Nerves II-XII grossly intact  Musculoskeletal:normal    Imaging

## 2025-02-16 NOTE — PROGRESS NOTES
PT Evaluation     Today's date: 2025  Patient name: Henna Ballard  : 1955  MRN: [id number]  Referring provider: Kocher, Barbara, CRNP  Dx:   Encounter Diagnosis     ICD-10-CM    1. Chronic bilateral low back pain without sciatica  M54.50 Ambulatory Referral to Physical Therapy    G89.29       2. Lumbar radiculopathy  M54.16 Ambulatory Referral to Physical Therapy      3. Sacroiliitis (HCC)  M46.1 Ambulatory Referral to Physical Therapy          Start Time: 1400  Stop Time: 1445  Total time in clinic (min): 45 minutes    Assessment  Impairments: abnormal gait, abnormal muscle firing, abnormal or restricted ROM, abnormal movement, activity intolerance, impaired physical strength, lacks appropriate home exercise program, pain with function, poor posture , poor body mechanics, unable to perform ADL, participation limitations, activity limitations and endurance  Symptom irritability: moderate    Assessment details: Henna Ballard is a 69 y.o. female referred to physical therapy for chronic BL LBP without sciatica, lumbar radiculopathy, and sacroiliitis. Primary impairments include increased pain with functional activities, decreased BLE strength, lumbar AROM dysfunction, lumbopelvic motor control dysfunction, and decreased gait quality which is limiting her ability to perform ADLs and recreational activities without pain or functional restrictions. Pt displayed positive slump at the RLE as well as sacral thrust at the R side which support initial dx. She responded well to supine lumbopelvic MET as she displayed decreased pain with R hip PROM and improved gait quality following.   Pt was provided with a basic HEP which will be reviewed in the upcoming session. Pt was educated on anatomy and physiology of diagnosis and demonstrated verbal understanding. Pt would benefit from skilled PT interventions to increase functional lower extremity strength, increase pain free ROM, and facilitate return to recreational  activities and ADL management/independence with less limitations and pain. 1:1 with Mason Bennett DPT entirety of tx.  Barriers to therapy: Urdu speaking only  Understanding of Dx/Px/POC: excellent     Prognosis: good    Goals  Short term goals:   STGs to be met in 3-4 weeks:  1.  Increase BLE and core/paraspinal strength by half grade or more to increase functional strength.  2.  Decrease subjective report of pain by 25% or more to improve QoL.   3.  Sit/Stand for recreational activities and/or ADLs for >/=30 minutes with minimal to no pain.  4.  Independent with basic HEP.     Long term goals:  LTG's to be met by DC:  1.  Ambulate community distances with little to no pain or difficulty.  2.  Perform all transfers without pain and difficulty.  3.  Perform recreational activities / ADLs with little pain or difficulty.  4.  Increase strength to 4/5 or better in BLEs and core/paraspinal musculature.   5.  Independent with advanced HEP.   6.  Increase FOTO to predicted value by DC.    Plan  Patient would benefit from: skilled physical therapy and PT eval  Referral necessary: No    Planned therapy interventions: abdominal trunk stabilization, balance/weight bearing training, body mechanics training, functional ROM exercises, home exercise program, gait training, joint mobilization, manual therapy, massage, neuromuscular re-education, patient education, postural training, strengthening, therapeutic activities, therapeutic exercise, self care, graded motor, graded exercise, graded activity, IASTM, nerve gliding and stretching    Frequency: 1-2x week  Duration in weeks: 10  Plan of Care beginning date: 2/17/2025  Plan of Care expiration date: 4/28/2025  Treatment plan discussed with: patient        Subjective  HPI: Pt referred to physical therapy for chronic BL LBP without sciatica, lumbar radiculopathy, and sacroiliitis. Pt mentioned that her back has been bothering her for about 5 years with insidious onset. She  mentioned that it started out with sciatic type of pain that bothers her at the lower back into the RLE. She mentioned that she has LLE pain as well. She has most trouble with lifting things as well as standing/walking for longer periods of time. She notes sitting is relieving when her back is in a lot of pain.   Pain Location: Lower Back  Pain Intensity: Current: 6/10, Worst: 10/10, Best: 5/10  TAE: Insidious  DOI: Chronic  Aggravating Factors: Lifting things, bending over, standing/walking  Alleviating Factors: Sitting, rest, medications  Living Situation: 3STE  Dominant Side: Right side  Goals: To have less pain and walk with less pain  PLOF: Sedentary (Has treadmill at home)    Objective  Vitals: BP- 118/84     Postural Findings:     Head Position  Protracted X  Neutral   Retracted   Scapular Position  Protracted  X Neutral   Retracted   Thoracic Spine   Inc Kyphosis X Neutral       Lumbar Spine   Inc Lordosis  X Neutral  Dec Lordosis   Pelvis   Anterior Tilt X Neutral   Posterior Tilt   Iliac Crest   L elevated X Neutral   R elevated   Feet   Pronated X Neutral   Supinated   Lateral Shift   Right   Left  None      Strength and ROM evaluated B from a regional biomechanical perspective and values relevant to this episode recorded in tables below.     ROM:   Joint / Motion  Right: 2/17/2025  Left: 2/17/2025    Lumbar Flexion  25% limited (p!)     Lumbar Extension  10 (p!)     Lumbar Sidebending  10 10   Hip ER  35 (p!) 45   Hip IR  10 15         Strength: MMT revealed the following findings.  Joint Motion Right: 2/17/2025 Left: 2/17/2025   Hip Flexion 4-/5 4-/5   Hip Abduction 3+/5 3+/5   Hip Adduction 4/5 4/5   Hip Extension 3+/5 3+/5   Knee Extension 4/5 4/5   Knee Flexion 4-/5 4-/5   Ankle Plantarflexion 4/5 4/5   Ankle Dorsiflexion 5/5 5/5         Repeated Movements:     Standing Baseline:                                                   DURING MVMT.                     RESPONSE AFTER  Standing Flexion  Increased symptoms No change   Standing Extension Increased symptoms No change      Lying Baseline:                                                   DURING MVMT.                     RESPONSE AFTER  Lying Flexion NT NT   Lying Extension Improved symptoms Improved symptoms         Light Touch Sensation:  RLE - 5/5  LLE - 5/5    Deep Tendon Reflexes:  Patellar Reflex - normal  Ankle Reflex - normal      Additional Assessments:  Pain with palpation noted: Increased pain along R SIJ and lumbar spine paraspinals  Passive intervertebral motion: Decreased thoracic and lumbar PA  Supine to Sit: RLE Longer than LLE  Gait Assessment: Antalgic gait, decreased stance time on RLE, decreased step length, increased step width     Special Tests:  Lumbar Specific and Neural Tension                                                                            Test / Measure  2/17/2025   Straight Leg raise N   Crossed straight leg raise N   Slump test Pos RLE   Prone instability test N   Sural n (invert), tibial (mena) N        SI Joint Screen:   Test / Measure  2/17/2025   Sacral Compression (SL) N   Sacral Distraction NT   Thigh Thrust NT   Gaenslens NT   Sacral Thrust Pos R side     Access Code: [email or web address]  URL: [email or web address]  Date: 02/17/2025  Prepared by: Mason Bennett    Exercises  - Supine Lower Trunk Rotation  - 1 x daily - 7 x weekly - 2 sets - 10 reps  - Supine Bridge  - 1 x daily - 7 x weekly - 3 sets - 8 reps  - Supine Piriformis Stretch with Leg Straight  - 1 x daily - 7 x weekly - 1 sets - 5 reps - 15 segundos hold  - Supine Sciatic Nerve Glide  - 1 x daily - 7 x weekly - 3 sets - 10 reps  - Prone Press Up  - 1 x daily - 7 x weekly - 2 sets - 10 reps         Precautions: PMH includes chronic R sided LBP without sciatica, HLD, R hand surgery      POC expires Unit limit Auth Expiration date PT/OT + Visit Limit?   4/28/25 BOMN TBD BOMN         Visit/Unit Tracking  AUTH Status:  Date 2/17        TBD Used 1          Remaining             Pertinent Findings:      POC End Date: 4/28/25                                                                                          Test / Measure  2/17/2025   FOTO (Predicted 63) 56   Lumbar ROM Pain at end ranges   BLE Strength 3+ to 4/5     Visit Number: 1            Manuals 2/17            BL Hip PROM             Paraspinal STM             Repeated Movements             Supine MET             Neuro Re-Ed             PPU 10x            TA Recruitment + Bridge 10x            Supine N. Glide 10x            Supine Tball Press             Modified Dead Bug             Tball Press             Tball Press + Hip flex/abd             [place] Hyperext w/ Bosu                          Ther Ex             HEP Review + Pt Edu 10 min            NuStep             LTRs 10x            Supine Piriformis Stretch 3x, 10s hold            Eccentric STS             Modified KB DL             Palloff Press ABCs             Standing Tband/[place] Rotation             Side Steps w/ TB             Fwd Step Ups             Suitcase Carry                                       Ther Activity                                       Gait Training                                       Modalities

## 2025-02-17 ENCOUNTER — EVALUATION (OUTPATIENT)
Dept: PHYSICAL THERAPY | Facility: REHABILITATION | Age: 70
End: 2025-02-17
Payer: COMMERCIAL

## 2025-02-17 DIAGNOSIS — M54.16 LUMBAR RADICULOPATHY: ICD-10-CM

## 2025-02-17 DIAGNOSIS — M54.50 CHRONIC BILATERAL LOW BACK PAIN WITHOUT SCIATICA: Primary | ICD-10-CM

## 2025-02-17 DIAGNOSIS — G89.29 CHRONIC BILATERAL LOW BACK PAIN WITHOUT SCIATICA: Primary | ICD-10-CM

## 2025-02-17 DIAGNOSIS — M46.1 SACROILIITIS (HCC): ICD-10-CM

## 2025-02-17 PROCEDURE — 97161 PT EVAL LOW COMPLEX 20 MIN: CPT

## 2025-02-17 PROCEDURE — 97110 THERAPEUTIC EXERCISES: CPT

## 2025-02-17 PROCEDURE — 97112 NEUROMUSCULAR REEDUCATION: CPT

## 2025-02-21 ENCOUNTER — OFFICE VISIT (OUTPATIENT)
Dept: DENTISTRY | Facility: CLINIC | Age: 70
End: 2025-02-21

## 2025-02-21 VITALS — TEMPERATURE: 97.3 F | SYSTOLIC BLOOD PRESSURE: 109 MMHG | HEART RATE: 78 BPM | DIASTOLIC BLOOD PRESSURE: 67 MMHG

## 2025-02-21 DIAGNOSIS — Z01.20 ENCOUNTER FOR DENTAL EXAMINATION: Primary | ICD-10-CM

## 2025-02-21 PROCEDURE — D1110 PROPHYLAXIS - ADULT: HCPCS | Performed by: DENTAL HYGIENIST

## 2025-02-21 PROCEDURE — D1330 ORAL HYGIENE INSTRUCTIONS: HCPCS | Performed by: DENTAL HYGIENIST

## 2025-02-21 NOTE — PROGRESS NOTES
ADULT PROPHY , OHI  (no xrays due )   REVIEWED MED HX: meds, allergies, health changes reviewed in [facility]. All consents signed.  CHIEF CONCERN: front tooth*  PAIN SCALE:  0  ASA CLASS:  II  PLAQUE:  mild  CALCULUS:  light  BLEEDING:   light  STAIN :   moderate      PERIO:  Mild gingivitis    I-PAD Bengali translation -  # [id number] - 10 min    Hygiene Procedures:  Scaled, Polished, Flossed and Used Cavitron    Oral Hygiene Instruction: Brushing Minimum 2x daily for 2 minutes, daily flossing, Listerine, and Recommended soft toothbrush only    Dispensed: Toothbrush, Toothpaste, Floss    Visual and Tactile Intraoral/ Extraoral evaluation: Oral and Oropharyngeal cancer evaluation. No findings     No exam - Dr. Schaffer consulted about tooth #9     Reviewed with patient clinical and radiographic findings and patient verbalized understanding. All questions and concerns addressed.     REFERRALS: none    CARIES FINDINGS: see tooth chart       TREATMENT  PLAN :   NV1:  Redo aleksandr #9 FI - 60 min w/ Karime  NV2:  6mrc - 50 min    Last BWX:   Last Panorex/   Last FMX :   12/16/24   HPI:    Patient ID: Fletcher Upton is a 61year old female. Pt presents with yellowish vaginal discharge. Pt states similar to when she had BV. Has had odor to discharge. Pt has hx of breast cancer. Had surgery and planning on reconstruction.        Re

## 2025-02-25 ENCOUNTER — APPOINTMENT (OUTPATIENT)
Dept: PHYSICAL THERAPY | Facility: REHABILITATION | Age: 70
End: 2025-02-25
Payer: COMMERCIAL

## 2025-02-27 ENCOUNTER — APPOINTMENT (OUTPATIENT)
Dept: PHYSICAL THERAPY | Facility: REHABILITATION | Age: 70
End: 2025-02-27
Payer: COMMERCIAL

## 2025-03-04 ENCOUNTER — OFFICE VISIT (OUTPATIENT)
Dept: PHYSICAL THERAPY | Facility: REHABILITATION | Age: 70
End: 2025-03-04
Payer: COMMERCIAL

## 2025-03-04 DIAGNOSIS — M54.50 CHRONIC BILATERAL LOW BACK PAIN WITHOUT SCIATICA: Primary | ICD-10-CM

## 2025-03-04 DIAGNOSIS — M46.1 SACROILIITIS (HCC): ICD-10-CM

## 2025-03-04 DIAGNOSIS — M54.16 LUMBAR RADICULOPATHY: ICD-10-CM

## 2025-03-04 DIAGNOSIS — G89.29 CHRONIC BILATERAL LOW BACK PAIN WITHOUT SCIATICA: Primary | ICD-10-CM

## 2025-03-04 PROCEDURE — 97112 NEUROMUSCULAR REEDUCATION: CPT

## 2025-03-04 PROCEDURE — 97110 THERAPEUTIC EXERCISES: CPT

## 2025-03-04 NOTE — PROGRESS NOTES
Daily Note     Today's date: 3/4/2025  Patient name: Henna Ballard  : 1955  MRN: [id number]  Referring provider: Kocher, Barbara, CRNP  Dx:   Encounter Diagnosis     ICD-10-CM    1. Chronic bilateral low back pain without sciatica  M54.50     G89.29       2. Lumbar radiculopathy  M54.16       3. Sacroiliitis (HCC)  M46.1           Start Time: 1530  Stop Time: 1615  Total time in clinic (min): 45 minutes    Subjective: Pt notes that she has been unable to attend PT sessions due to having to take care of her mother at home as she is her primary caregiver.  She reports her back pain has been improving but continues to have slight pain at times with lifting.  She has been doing her exercises at home and these have been helping.      Objective: See treatment diary below      Assessment: Tolerated treatment well. Patient would benefit from continued PT. Pt was introduced to functional LE strength exercises as well as core stability movements and responded well.  She continues to respond well to prone lumbar extension as she demonstrates improved mobility and decreased pain following.  She was most challenged by STS as well as palloff press circles.  Assess response to Tx session next visit. Continue to progress as tolerated, 1:1 with Mason Bennett DPT from 1530 to 1600. IEP remainder.       Plan: Continue per plan of care.      Precautions: PMH includes chronic R sided LBP without sciatica, HLD, R hand surgery      POC expires Unit limit Auth Expiration date PT/OT + Visit Limit?   25 BOMN TBD BOMN         Visit/Unit Tracking  AUTH Status:  Date 2/17 3/4       Approved Used 1 2        Remaining             Pertinent Findings:      POC End Date: 25                                                                                          Test / Measure  2025   FOTO (Predicted 63) 56   Lumbar ROM Pain at end ranges   BLE Strength 3+ to 4/5     Visit Number: 1 2           Manuals 2/17 3/4           BL  Hip PROM  MC           Paraspinal STM             Repeated Movements             Supine MET             Neuro Re-Ed             PPU 10x 10x           TA Recruitment + Bridge 10x 3x8           Supine N. Glide 10x            Supine Tball Press  10x, 5s hold           Modified Dead Bug             Tball Press  10x, 5s hold           Tball Press + Hip flex/abd  2x10 march           [place] Hyperext w/ Bosu                          Ther Ex             HEP Review + Pt Edu 10 min            NuStep  7', lvl 5           LTRs 10x 20x           Supine Piriformis Stretch 3x, 10s hold            Eccentric STS  3x8           Modified KB DL             Palloff Press Circles  15x ea direction           Standing Tband/[place] Rotation             Side Steps w/ TB             Fwd Step Ups             Suitcase Carry                                       Ther Activity                                       Gait Training                                       Modalities

## 2025-03-09 NOTE — TELEPHONE ENCOUNTER
----- Message from Kanika Galicia, Toi Krys Zamora sent at 1/4/2023  9:42 AM EST -----  Mild obstructive sleep apnea was confirmed during the diagnostic sleep study and is likely contributing to symptoms  Severity is likely understated, due to poor sleep efficiency  Recommend trial of auto-CPAP  A prescription for equipment has been provided  Patient to be scheduled for set up of equipment, followed by compliance follow up 31-91 days after set up  normal...

## 2025-03-11 ENCOUNTER — APPOINTMENT (OUTPATIENT)
Dept: PHYSICAL THERAPY | Facility: REHABILITATION | Age: 70
End: 2025-03-11
Payer: COMMERCIAL

## 2025-03-31 ENCOUNTER — APPOINTMENT (OUTPATIENT)
Dept: LAB | Facility: CLINIC | Age: 70
End: 2025-03-31
Payer: COMMERCIAL

## 2025-03-31 DIAGNOSIS — E66.812 CLASS 2 SEVERE OBESITY WITH SERIOUS COMORBIDITY AND BODY MASS INDEX (BMI) OF 39.0 TO 39.9 IN ADULT, UNSPECIFIED OBESITY TYPE (HCC): ICD-10-CM

## 2025-03-31 DIAGNOSIS — E66.01 CLASS 2 SEVERE OBESITY WITH SERIOUS COMORBIDITY AND BODY MASS INDEX (BMI) OF 39.0 TO 39.9 IN ADULT, UNSPECIFIED OBESITY TYPE (HCC): ICD-10-CM

## 2025-03-31 DIAGNOSIS — R94.6 ABNORMAL THYROID FUNCTION TEST: ICD-10-CM

## 2025-03-31 DIAGNOSIS — E78.2 MIXED HYPERLIPIDEMIA: ICD-10-CM

## 2025-03-31 DIAGNOSIS — R73.03 PREDIABETES: ICD-10-CM

## 2025-03-31 LAB
ALBUMIN SERPL BCG-MCNC: 4.4 G/DL (ref 3.5–5)
ALP SERPL-CCNC: 98 U/L (ref 34–104)
ALT SERPL W P-5'-P-CCNC: 13 U/L (ref 7–52)
ANION GAP SERPL CALCULATED.3IONS-SCNC: 12 MMOL/L (ref 4–13)
AST SERPL W P-5'-P-CCNC: 16 U/L (ref 13–39)
BILIRUB SERPL-MCNC: 0.25 MG/DL (ref 0.2–1)
BUN SERPL-MCNC: 22 MG/DL (ref 5–25)
CALCIUM SERPL-MCNC: 9.4 MG/DL (ref 8.4–10.2)
CHLORIDE SERPL-SCNC: 103 MMOL/L (ref 96–108)
CHOLEST SERPL-MCNC: 241 MG/DL (ref ?–200)
CO2 SERPL-SCNC: 25 MMOL/L (ref 21–32)
CREAT SERPL-MCNC: 0.67 MG/DL (ref 0.6–1.3)
EST. AVERAGE GLUCOSE BLD GHB EST-MCNC: 123 MG/DL
GFR SERPL CREATININE-BSD FRML MDRD: 89 ML/MIN/1.73SQ M
GLUCOSE P FAST SERPL-MCNC: 108 MG/DL (ref 65–99)
HBA1C MFR BLD: 5.9 %
HDLC SERPL-MCNC: 76 MG/DL
LDLC SERPL CALC-MCNC: 106 MG/DL (ref 0–100)
NONHDLC SERPL-MCNC: 165 MG/DL
POTASSIUM SERPL-SCNC: 4 MMOL/L (ref 3.5–5.3)
PROT SERPL-MCNC: 7.3 G/DL (ref 6.4–8.4)
SODIUM SERPL-SCNC: 140 MMOL/L (ref 135–147)
T4 FREE SERPL-MCNC: 0.79 NG/DL (ref 0.61–1.12)
TRIGL SERPL-MCNC: 294 MG/DL (ref ?–150)
TSH SERPL DL<=0.05 MIU/L-ACNC: 0.21 UIU/ML (ref 0.45–4.5)

## 2025-03-31 PROCEDURE — 83036 HEMOGLOBIN GLYCOSYLATED A1C: CPT

## 2025-03-31 PROCEDURE — 80061 LIPID PANEL: CPT

## 2025-03-31 PROCEDURE — 84439 ASSAY OF FREE THYROXINE: CPT

## 2025-03-31 PROCEDURE — 80053 COMPREHEN METABOLIC PANEL: CPT

## 2025-03-31 PROCEDURE — 84443 ASSAY THYROID STIM HORMONE: CPT

## 2025-03-31 PROCEDURE — 36415 COLL VENOUS BLD VENIPUNCTURE: CPT

## 2025-04-29 ENCOUNTER — OFFICE VISIT (OUTPATIENT)
Dept: DENTISTRY | Facility: CLINIC | Age: 70
End: 2025-04-29

## 2025-04-29 VITALS — SYSTOLIC BLOOD PRESSURE: 117 MMHG | DIASTOLIC BLOOD PRESSURE: 73 MMHG | HEART RATE: 77 BPM | TEMPERATURE: 98.4 F

## 2025-04-29 DIAGNOSIS — K08.50: Primary | ICD-10-CM

## 2025-04-29 PROCEDURE — WIS2002 PR RESTORE REDO <1YR

## 2025-04-29 NOTE — DENTAL PROCEDURE DETAILS
Composite Filling    Henna Ballard presents for composite filling. PMH reviewed, no changes.    Applied topical benzocaine, administered 1 carp 4% articaine 1:100k epi via buccal inifltration    Prepped tooth #9 FI with 1/2 carbide and keely beveling disk on high speed.  Isolation with cotton rolls and dri-angles    Etch with 37% H2PO4, rinse, dry. Applied Adhese with 20 second scrub once, gentle air dry and light cured for 10s. Restored with Tetric bulk alejandro shade A3 and light cured.    Refined with finishing burs, polished disks. Verified occlusion and contacts. Pt left satisfied.    NV: Karime, 45 min, #30 DOL

## 2025-04-29 NOTE — PROGRESS NOTES
Procedure Details  [id number] - KS RESTORE REDO <1YR  Composite Filling    Henna Ballard presents for composite filling. PMH reviewed, no changes.    Applied topical benzocaine, administered 1 carp 4% articaine 1:100k epi via buccal inifltration    Prepped tooth #9 FI with 1/2 carbide and keely beveling disk on high speed.  Isolation with cotton rolls and dri-angles    Etch with 37% H2PO4, rinse, dry. Applied Adhese with 20 second scrub once, gentle air dry and light cured for 10s. Restored with Tetric bulk alejandro shade A3 and light cured.    Refined with finishing burs, polished disks. Verified occlusion and contacts. Pt left satisfied.    NV: Karime, 45 min, #30 DOL

## 2025-05-05 ENCOUNTER — OFFICE VISIT (OUTPATIENT)
Dept: FAMILY MEDICINE CLINIC | Facility: CLINIC | Age: 70
End: 2025-05-05

## 2025-05-05 VITALS
RESPIRATION RATE: 18 BRPM | HEART RATE: 79 BPM | WEIGHT: 185 LBS | OXYGEN SATURATION: 97 % | SYSTOLIC BLOOD PRESSURE: 122 MMHG | DIASTOLIC BLOOD PRESSURE: 68 MMHG | HEIGHT: 60 IN | BODY MASS INDEX: 36.32 KG/M2 | TEMPERATURE: 98.2 F

## 2025-05-05 DIAGNOSIS — E66.9 OBESITY (BMI 30-39.9): ICD-10-CM

## 2025-05-05 DIAGNOSIS — E05.90 SUBCLINICAL HYPERTHYROIDISM: ICD-10-CM

## 2025-05-05 DIAGNOSIS — E78.2 MIXED HYPERLIPIDEMIA: ICD-10-CM

## 2025-05-05 DIAGNOSIS — Z00.00 ANNUAL PHYSICAL EXAM: Primary | ICD-10-CM

## 2025-05-05 DIAGNOSIS — E53.8 B12 DEFICIENCY: ICD-10-CM

## 2025-05-05 PROCEDURE — 99214 OFFICE O/P EST MOD 30 MIN: CPT | Performed by: FAMILY MEDICINE

## 2025-05-05 PROCEDURE — 99396 PREV VISIT EST AGE 40-64: CPT | Performed by: FAMILY MEDICINE

## 2025-05-05 RX ORDER — PRAVASTATIN SODIUM 20 MG
20 TABLET ORAL
Qty: 90 TABLET | Refills: 2 | Status: SHIPPED | OUTPATIENT
Start: 2025-05-05

## 2025-05-05 RX ORDER — SEMAGLUTIDE 2.4 MG/.75ML
2.4 INJECTION, SOLUTION SUBCUTANEOUS WEEKLY
Qty: 3 ML | Refills: 2 | Status: SHIPPED | OUTPATIENT
Start: 2025-05-05 | End: 2025-08-03

## 2025-05-05 NOTE — ASSESSMENT & PLAN NOTE
Lab Results   Component Value Date/Time    CHOLESTEROL 241 (H) 03/31/2025 02:30 PM    TRIG 294 (H) 03/31/2025 02:30 PM    HDL 76 03/31/2025 02:30 PM    LDLCALC 106 (H) 03/31/2025 02:30 PM     Not well controlled  Encouraged pt to take Pravastatin regularly  Will recheck in 3 months  Low Fat Diet  Regular Exercise    Orders:  •  pravastatin (PRAVACHOL) 20 mg tablet; Take 1 tablet (20 mg total) by mouth daily at bedtime

## 2025-05-05 NOTE — ASSESSMENT & PLAN NOTE
Body mass index is 36.13 kg/m².  Pt has lost 27 lbs  Orders:  •  Semaglutide-Weight Management (Wegovy) 2.4 MG/0.75ML; Inject 0.75 mL (2.4 mg total) under the skin once a week Inject 2.4 mg under the skin weekly

## 2025-05-05 NOTE — PROGRESS NOTES
Adult Annual Physical  Name: Henna Ballard      : 1955      MRN: [id number]  Encounter Provider: Pina Martins MD  Encounter Date: 2025   Encounter department: [facility]    :  Assessment & Plan  Annual physical exam  Preventive screenings and Immunizations up to date       Subclinical hyperthyroidism  Reviewed labs  TSH decreased, T4 normal  Will continue to monitor labs and sx  Orders:  •  TSH + Free T4; Future    Mixed hyperlipidemia  Lab Results   Component Value Date/Time    CHOLESTEROL 241 (H) 2025 02:30 PM    TRIG 294 (H) 2025 02:30 PM    HDL 76 2025 02:30 PM    LDLCALC 106 (H) 2025 02:30 PM     Not well controlled  Encouraged pt to take Pravastatin regularly  Will recheck in 3 months  Low Fat Diet  Regular Exercise    Orders:  •  pravastatin (PRAVACHOL) 20 mg tablet; Take 1 tablet (20 mg total) by mouth daily at bedtime    B12 deficiency    Orders:  •  Vitamin B12; Future    Obesity (BMI 30-39.9)  Body mass index is 36.13 kg/m².  Pt has lost 27 lbs  Increase Wegovy to 2.4 mg weekly    Orders:  •  Semaglutide-Weight Management (Wegovy) 2.4 MG/0.75ML; Inject 0.75 mL (2.4 mg total) under the skin once a week Inject 2.4 mg under the skin weekly        Preventive Screenings:  - Diabetes Screening: screening up-to-date  - Cholesterol Screening: screening not indicated and has hyperlipidemia   - Hepatitis C screening: screening up-to-date   - Cervical cancer screening: screening not indicated   - Breast cancer screening: screening up-to-date   - Colon cancer screening: screening up-to-date   - Lung cancer screening: screening not indicated   - Osteoporosis screening: screening up-to-date     Immunizations:    - Risks/benefits immunizations discussed      Counseling/Anticipatory Guidance:    - Dental health: discussed importance of regular tooth brushing, flossing, and dental visits.   - Diet: discussed recommendations for a  healthy/well-balanced diet.   - Exercise: the importance of regular exercise/physical activity was discussed. Recommend exercise 3-5 times per week for at least 30 minutes.   - Injury prevention: discussed safety/seat belts, safety helmets, smoke detectors, carbon monoxide detectors, and smoking near bedding or upholstery.          History of Present Illness     Adult Annual Physical:  Patient presents for annual physical. Henna Ballard is a 69 y.o. female who presented to the office today for Annual Physical.    Pt is using the Wegovy and has not lost more weight this visit, did lose 30 lbs previously.  Currently taking care of her 102 year old mother with dementia.      The following portions of the patient's history were reviewed and updated as appropriate: allergies, current medications, past family history, past medical history, past social history, past surgical history and problem list.  .     Diet and Physical Activity:  - Diet/Nutrition: well balanced diet.  - Exercise: walking.    General Health:  - Sleep: sleeps poorly and 4-6 hours of sleep on average. needs to care for her mother with dementia, not sleeping well at night  - Hearing: normal hearing bilateral ears.  - Vision: wears glasses and most recent eye exam < 1 year ago.  - Dental: brushes teeth twice daily.    /GYN Health:  - Follows with GYN: no.   - Menopause: postmenopausal.   - History of STDs: no    Advanced Care Planning:  - Has an advanced directive?: no    - Has a durable medical POA?: no    - ACP document given to patient?: yes      Review of Systems   Constitutional:  Negative for chills, fatigue and fever.   HENT:  Negative for congestion, rhinorrhea and sore throat.    Respiratory:  Negative for cough and shortness of breath.    Cardiovascular:  Negative for chest pain.   Gastrointestinal:  Negative for diarrhea, nausea and vomiting.   Musculoskeletal:  Positive for arthralgias and back pain.   Skin:  Negative for rash.   Neurological:   Negative for dizziness and headaches.         Objective   /68 (BP Location: Left arm, Patient Position: Sitting, Cuff Size: Standard)   Pulse 79   Temp 98.2 °F (36.8 °C) (Temporal)   Resp 18   Ht 5' (1.524 m)   Wt 83.9 kg (185 lb)   SpO2 97%   BMI 36.13 kg/m²     Physical Exam  Vitals and nursing note reviewed.   Constitutional:       General: She is not in acute distress.     Appearance: She is well-developed. She is obese.   HENT:      Head: Normocephalic and atraumatic.     Eyes:      Conjunctiva/sclera: Conjunctivae normal.       Cardiovascular:      Rate and Rhythm: Normal rate and regular rhythm.      Heart sounds: No murmur heard.  Pulmonary:      Effort: Pulmonary effort is normal. No respiratory distress.      Breath sounds: Normal breath sounds.   Abdominal:      Palpations: Abdomen is soft.      Tenderness: There is no abdominal tenderness.     Musculoskeletal:         General: No swelling.      Cervical back: Neck supple.     Skin:     General: Skin is warm and dry.      Capillary Refill: Capillary refill takes less than 2 seconds.     Neurological:      Mental Status: She is alert and oriented to person, place, and time.     Psychiatric:         Mood and Affect: Mood normal.         Behavior: Behavior normal.

## 2025-05-05 NOTE — PATIENT INSTRUCTIONS
"Patient Education     Examen físico de rutina para adultos   Conceptos Básicos   Redactado por los médicos y editores de [employer]   ¿Qué es un examen físico? -- Un examen físico es angela consulta de rutina o \"revisión\" con thomas médico. También se conoce flavio \"consulta de bienestar\" o \"consulta preventiva\".  Ramon cada consulta, el médico hará lo siguiente:   Preguntará por thomas cynthia física y mental   Preguntará sobre katja hábitos, conductas y estilo de harjit   Le hará un examen   Le administrará las vacunas que chandler necesarias   Hablará con usted sobre cualquier medicina que tome   Le dará consejos sobre thomas cynthia   Responderá katja preguntas  Hacerse revisiones periódicas es angela parte importante del cuidado de thomas cynthia. Puede ayudar a thomas médico a encontrar y tratar cualquier problema que tenga. Rafael también es importante para prevenir problemas de cynthia.  Un examen físico de rutina es diferente de angela \"consulta por enfermedad\". Angeal consulta por enfermedad es cuando lo atiende un médico debido a un problema o inquietud de cynthia. Dado que los exámenes físicos se programan con anticipación, usted puede pensar en lo que quiere preguntarle al médico.  ¿Con qué frecuencia diana hacerme un examen físico? -- Depende de thomas edad y de thomas estado de cynthia. En general, en el veronika de las personas mayores de 21 años:   Si tiene menos de 50 años, es posible que pueda hacerse un examen físico cada 3 años.   Si tiene 50 años o más, thomas médico podría recomendarle un examen físico cada año.  Si tiene un padecimiento de cynthia crónico, therese flavio diabetes o presión arterial gilda, thomas médico probablemente querrá verlo con más frecuencia.  ¿Qué sucede ramon un examen físico? -- En general, cada consulta incluirá:   Examen físico - El médico o enfermero revisará thomas estatura, peso, frecuencia cardíaca y presión arterial. También le examinará los ojos y los oídos. Le preguntará cómo se siente y si tiene algún síntoma que le moleste.   Medicinas - Es angela " "buena idea llevar angela lista de todos las medicinas que susan cada vez que acude a la consulta médica. Thomas médico le hablará sobre dixie medicinas y responderá a dixie preguntas. Dígale si tiene algún efecto secundario que le moleste. También debe informarle si tiene dificultades para pagar alguna de dixie medicinas.   Hábitos y comportamientos - [place] incluye:   Thomas dieta   Dixie hábitos de ejercicio   Si fuma, elie alcohol o consume drogas   Si es sexualmente activo   Si se siente seguro en casa  Thomas médico hablará con usted sobre las cosas que puede hacer para mejorar thomas cynthia y reducir el riesgo de tener problemas de cynthia. También ofrecerá ayuda y apoyo. Por ejemplo, si quiere dejar de fumar, puede darle consejos y recetarle medicinas. Si quiere mejorar thomas alimentación o realizar más actividad física, thomas médico también puede ayudarlo a lograr estos objetivos.   Pruebas de laboratorio, si son necesarias - Las pruebas que le realicen dependerán de thomas edad y situación. Por ejemplo, es posible que thomas médico quiera revisar thomas:   Colesterol   Azúcar en joao   Nivel de gretchen   Vacunas - Las vacunas recomendadas dependerán de thomas edad, thomas cynthia y de las vacunas que ya haya recibido. Las vacunas son muy importantes porque pueden prevenir ciertas infecciones graves o mortales.   Análisis sobre las pruebas de detección - \"Detección\" significa revisar si hay enfermedades u otros problemas de cynthia antes de que causen síntomas. Thomas médico puede recomendar pruebas de detección según thomas edad, riesgo y preferencias. [place] podría incluir pruebas para detectar:   Cáncer, flavio cáncer de seno, próstata, haley uterino, ovario, colorrectal, próstata, pulmón o piel   Infecciones de transmisión sexual tales flavio clamidia y gonorrea   Padecimientos de cynthia mental tales flavio depresión y ansiedad.  El médico le hablará sobre los diferentes tipos de pruebas de detección. Puede ayudarlo a decidir qué pruebas de detección debe hacerse. También le puede " explicar lo que podrían significar los resultados.   Responder preguntas - El examen físico es un buen momento para hacerle preguntas al médico o enfermero sobre thomas cynthia. Si es necesario, también puede derivarlo a otros médicos o especialistas.  Los adultos mayores de 65 años a menudo también necesitan otros cuidados. A medida que envejece, thomas médico hablará con usted sobre:   Cómo evitar las caídas en el hogar   Pruebas de audición o visión   Pruebas de memoria   Cómo dakotah katja medicinas de manera briscoe   Asegurarse de tener la ayuda y el apoyo que necesita en casa  Todos los artículos se actualizan a medida que se descubre nueva evidencia y culmina nuestro proceso de evaluación por homólogos   [place] artículo se recuperó de Up[employer] el: May 02, 2024.  Artículo 215698 Versión [id number]  Release: 32.4.3 - C32.122  © 2024 [employer] Inc. Todos los derechos reservados.  Exención de responsabilidad y uso de la información del consumidor   Descargo de responsabilidad: esta información generalizada es un resumen limitado de información sobre el diagnóstico, el tratamiento y/o los medicamentos. No pretende ser exhaustiva y se debe utilizar flavio herramienta para ayudar al usuario a comprender y/o evaluar las posibles opciones de diagnóstico y tratamiento. No incluye toda la información sobre afecciones, tratamientos, medicamentos, efectos secundarios o riesgos puedan ser aplicables a un paciente específico. No tiene el propósito de servir flavio recomendación médica ni de sustituir la recomendación médica, el diagnóstico o el tratamiento de un profesional de atención médica que se base en el examen y la evaluación de [place] profesional de la cynthia respecto a las circunstancias específicas y únicas del paciente. Los pacientes deben hablar con un profesional de atención médica para obtener información completa sobre thomas cynthia, cuestiones médicas y opciones de tratamiento, incluidos los riesgos o los beneficios relacionados con  el uso de medicamentos. Esta información no certifica que los tratamientos o medicamentos chandler seguros, eficaces o estén aprobados para tratar a un paciente específico. [employer] y katja afiliados renuncian a cualquier garantía o responsabilidad relacionada con esta información o el uso de la misma.El uso de esta información está sujeto a las Condiciones de uso, disponibles en https://www.Stayzillaer.com/en/know/clinical-effectiveness-terms. 2024© [employer] y katja afiliados y/o licenciantes. Todos los derechos reservados.  Copyright   © 2024 [employer] Todos los derechos reservados.

## 2025-05-18 PROBLEM — E66.9 OBESITY (BMI 30-39.9): Status: ACTIVE | Noted: 2023-09-01

## 2025-05-18 NOTE — ASSESSMENT & PLAN NOTE
Body mass index is 36.13 kg/m².  Pt has lost 27 lbs  Increase Wegovy to 2.4 mg weekly    Orders:  •  Semaglutide-Weight Management (Wegovy) 2.4 MG/0.75ML; Inject 0.75 mL (2.4 mg total) under the skin once a week Inject 2.4 mg under the skin weekly

## 2025-07-19 DIAGNOSIS — E66.9 OBESITY (BMI 30-39.9): ICD-10-CM

## 2025-07-21 RX ORDER — SEMAGLUTIDE 2.4 MG/.75ML
INJECTION, SOLUTION SUBCUTANEOUS
Qty: 2 ML | Refills: 2 | Status: SHIPPED | OUTPATIENT
Start: 2025-07-21

## 2025-07-24 ENCOUNTER — OFFICE VISIT (OUTPATIENT)
Dept: DENTISTRY | Facility: CLINIC | Age: 70
End: 2025-07-24

## 2025-07-24 VITALS — TEMPERATURE: 98.4 F | HEART RATE: 75 BPM | DIASTOLIC BLOOD PRESSURE: 72 MMHG | SYSTOLIC BLOOD PRESSURE: 117 MMHG

## 2025-07-24 DIAGNOSIS — K02.9 DENTAL CARIES: Primary | ICD-10-CM

## 2025-07-24 PROCEDURE — D2393 RESIN-BASED COMPOSITE - 3 SURFACES, POSTERIOR: HCPCS

## 2025-08-01 ENCOUNTER — HOSPITAL ENCOUNTER (OUTPATIENT)
Dept: MAMMOGRAPHY | Facility: CLINIC | Age: 70
Discharge: HOME | End: 2025-08-01
Attending: FAMILY MEDICINE
Payer: COMMERCIAL

## 2025-08-01 VITALS — BODY MASS INDEX: 36.32 KG/M2 | HEIGHT: 60 IN | WEIGHT: 185 LBS

## 2025-08-01 DIAGNOSIS — Z12.31 VISIT FOR SCREENING MAMMOGRAM: ICD-10-CM

## 2025-08-01 PROCEDURE — 77067 SCR MAMMO BI INCL CAD: CPT

## 2025-08-01 PROCEDURE — 77063 BREAST TOMOSYNTHESIS BI: CPT

## 2025-08-04 ENCOUNTER — OFFICE VISIT (OUTPATIENT)
Dept: FAMILY MEDICINE CLINIC | Facility: CLINIC | Age: 70
End: 2025-08-04

## 2025-08-04 VITALS
HEART RATE: 74 BPM | RESPIRATION RATE: 16 BRPM | TEMPERATURE: 96.6 F | OXYGEN SATURATION: 94 % | HEIGHT: 60 IN | DIASTOLIC BLOOD PRESSURE: 70 MMHG | WEIGHT: 184.9 LBS | SYSTOLIC BLOOD PRESSURE: 120 MMHG | BODY MASS INDEX: 36.3 KG/M2

## 2025-08-04 DIAGNOSIS — R94.6 ABNORMAL THYROID FUNCTION TEST: ICD-10-CM

## 2025-08-04 DIAGNOSIS — M54.6 CHRONIC BILATERAL THORACIC BACK PAIN: ICD-10-CM

## 2025-08-04 DIAGNOSIS — E66.01 CLASS 2 SEVERE OBESITY WITH SERIOUS COMORBIDITY AND BODY MASS INDEX (BMI) OF 39.0 TO 39.9 IN ADULT, UNSPECIFIED OBESITY TYPE (HCC): Primary | ICD-10-CM

## 2025-08-04 DIAGNOSIS — N62 MACROMASTIA: ICD-10-CM

## 2025-08-04 DIAGNOSIS — G89.29 CHRONIC BILATERAL THORACIC BACK PAIN: ICD-10-CM

## 2025-08-04 DIAGNOSIS — E78.2 MIXED HYPERLIPIDEMIA: ICD-10-CM

## 2025-08-04 DIAGNOSIS — E66.812 CLASS 2 SEVERE OBESITY WITH SERIOUS COMORBIDITY AND BODY MASS INDEX (BMI) OF 39.0 TO 39.9 IN ADULT, UNSPECIFIED OBESITY TYPE (HCC): Primary | ICD-10-CM

## 2025-08-04 PROCEDURE — 99214 OFFICE O/P EST MOD 30 MIN: CPT | Performed by: FAMILY MEDICINE

## 2025-08-04 RX ORDER — MELOXICAM 7.5 MG/1
7.5 TABLET ORAL DAILY
Qty: 30 TABLET | Refills: 1 | Status: SHIPPED | OUTPATIENT
Start: 2025-08-04

## 2025-08-04 RX ORDER — TIRZEPATIDE 7.5 MG/.5ML
7.5 INJECTION, SOLUTION SUBCUTANEOUS WEEKLY
Qty: 2 ML | Refills: 0 | Status: SHIPPED | OUTPATIENT
Start: 2025-09-01

## 2025-08-06 ENCOUNTER — TELEPHONE (OUTPATIENT)
Dept: FAMILY MEDICINE CLINIC | Facility: CLINIC | Age: 70
End: 2025-08-06

## 2025-08-12 ENCOUNTER — EVALUATION (OUTPATIENT)
Dept: PHYSICAL THERAPY | Age: 70
End: 2025-08-12
Attending: FAMILY MEDICINE
Payer: COMMERCIAL